# Patient Record
Sex: FEMALE | Race: WHITE | NOT HISPANIC OR LATINO
[De-identification: names, ages, dates, MRNs, and addresses within clinical notes are randomized per-mention and may not be internally consistent; named-entity substitution may affect disease eponyms.]

---

## 2017-04-01 ENCOUNTER — TRANSCRIPTION ENCOUNTER (OUTPATIENT)
Age: 37
End: 2017-04-01

## 2017-11-22 ENCOUNTER — TRANSCRIPTION ENCOUNTER (OUTPATIENT)
Age: 37
End: 2017-11-22

## 2018-07-16 PROBLEM — Z83.3 FAMILY HISTORY OF DIABETES MELLITUS: Status: ACTIVE | Noted: 2018-07-16

## 2018-07-17 ENCOUNTER — APPOINTMENT (OUTPATIENT)
Dept: ANTEPARTUM | Facility: CLINIC | Age: 38
End: 2018-07-17

## 2018-07-17 ENCOUNTER — APPOINTMENT (OUTPATIENT)
Dept: MATERNAL FETAL MEDICINE | Facility: CLINIC | Age: 38
End: 2018-07-17

## 2018-07-17 DIAGNOSIS — Z83.3 FAMILY HISTORY OF DIABETES MELLITUS: ICD-10-CM

## 2018-09-05 ENCOUNTER — RX CHANGE (OUTPATIENT)
Age: 38
End: 2018-09-05

## 2018-11-02 ENCOUNTER — APPOINTMENT (OUTPATIENT)
Dept: OBGYN | Facility: CLINIC | Age: 38
End: 2018-11-02
Payer: COMMERCIAL

## 2018-11-02 VITALS
SYSTOLIC BLOOD PRESSURE: 120 MMHG | DIASTOLIC BLOOD PRESSURE: 70 MMHG | BODY MASS INDEX: 39.37 KG/M2 | WEIGHT: 245 LBS | HEIGHT: 66 IN

## 2018-11-02 DIAGNOSIS — Z86.39 PERSONAL HISTORY OF OTHER ENDOCRINE, NUTRITIONAL AND METABOLIC DISEASE: ICD-10-CM

## 2018-11-02 DIAGNOSIS — Z87.09 PERSONAL HISTORY OF OTHER DISEASES OF THE RESPIRATORY SYSTEM: ICD-10-CM

## 2018-11-02 DIAGNOSIS — Z78.9 OTHER SPECIFIED HEALTH STATUS: ICD-10-CM

## 2018-11-02 PROCEDURE — 99213 OFFICE O/P EST LOW 20 MIN: CPT

## 2018-11-28 ENCOUNTER — ASOB RESULT (OUTPATIENT)
Age: 38
End: 2018-11-28

## 2018-11-28 ENCOUNTER — APPOINTMENT (OUTPATIENT)
Dept: ANTEPARTUM | Facility: CLINIC | Age: 38
End: 2018-11-28
Payer: COMMERCIAL

## 2018-11-28 ENCOUNTER — APPOINTMENT (OUTPATIENT)
Dept: OBGYN | Facility: CLINIC | Age: 38
End: 2018-11-28
Payer: COMMERCIAL

## 2018-11-28 VITALS
DIASTOLIC BLOOD PRESSURE: 78 MMHG | WEIGHT: 256 LBS | SYSTOLIC BLOOD PRESSURE: 118 MMHG | HEIGHT: 66 IN | BODY MASS INDEX: 41.14 KG/M2

## 2018-11-28 PROCEDURE — 99213 OFFICE O/P EST LOW 20 MIN: CPT

## 2018-11-28 PROCEDURE — 81025 URINE PREGNANCY TEST: CPT

## 2018-11-28 PROCEDURE — 76817 TRANSVAGINAL US OBSTETRIC: CPT

## 2018-12-19 ENCOUNTER — ASOB RESULT (OUTPATIENT)
Age: 38
End: 2018-12-19

## 2018-12-19 ENCOUNTER — APPOINTMENT (OUTPATIENT)
Dept: OBGYN | Facility: CLINIC | Age: 38
End: 2018-12-19
Payer: COMMERCIAL

## 2018-12-19 ENCOUNTER — APPOINTMENT (OUTPATIENT)
Dept: ANTEPARTUM | Facility: CLINIC | Age: 38
End: 2018-12-19
Payer: COMMERCIAL

## 2018-12-19 VITALS
HEIGHT: 66 IN | BODY MASS INDEX: 42.43 KG/M2 | SYSTOLIC BLOOD PRESSURE: 116 MMHG | WEIGHT: 264 LBS | DIASTOLIC BLOOD PRESSURE: 78 MMHG

## 2018-12-19 PROCEDURE — 99213 OFFICE O/P EST LOW 20 MIN: CPT

## 2018-12-19 PROCEDURE — 0501F PRENATAL FLOW SHEET: CPT

## 2018-12-19 PROCEDURE — 76801 OB US < 14 WKS SINGLE FETUS: CPT

## 2018-12-22 ENCOUNTER — LABORATORY RESULT (OUTPATIENT)
Age: 38
End: 2018-12-22

## 2019-01-02 ENCOUNTER — TRANSCRIPTION ENCOUNTER (OUTPATIENT)
Age: 39
End: 2019-01-02

## 2019-01-07 ENCOUNTER — APPOINTMENT (OUTPATIENT)
Dept: ANTEPARTUM | Facility: CLINIC | Age: 39
End: 2019-01-07
Payer: COMMERCIAL

## 2019-01-07 ENCOUNTER — ASOB RESULT (OUTPATIENT)
Age: 39
End: 2019-01-07

## 2019-01-07 ENCOUNTER — APPOINTMENT (OUTPATIENT)
Dept: MATERNAL FETAL MEDICINE | Facility: CLINIC | Age: 39
End: 2019-01-07
Payer: COMMERCIAL

## 2019-01-07 VITALS
BODY MASS INDEX: 41.78 KG/M2 | DIASTOLIC BLOOD PRESSURE: 70 MMHG | HEIGHT: 66 IN | WEIGHT: 260 LBS | SYSTOLIC BLOOD PRESSURE: 128 MMHG

## 2019-01-07 DIAGNOSIS — Z78.9 OTHER SPECIFIED HEALTH STATUS: ICD-10-CM

## 2019-01-07 DIAGNOSIS — Z80.42 FAMILY HISTORY OF MALIGNANT NEOPLASM OF PROSTATE: ICD-10-CM

## 2019-01-07 DIAGNOSIS — Z82.49 FAMILY HISTORY OF ISCHEMIC HEART DISEASE AND OTHER DISEASES OF THE CIRCULATORY SYSTEM: ICD-10-CM

## 2019-01-07 DIAGNOSIS — Z80.49 FAMILY HISTORY OF MALIGNANT NEOPLASM OF OTHER GENITAL ORGANS: ICD-10-CM

## 2019-01-07 DIAGNOSIS — Z80.41 FAMILY HISTORY OF MALIGNANT NEOPLASM OF OVARY: ICD-10-CM

## 2019-01-07 PROCEDURE — 76813 OB US NUCHAL MEAS 1 GEST: CPT

## 2019-01-07 PROCEDURE — 99244 OFF/OP CNSLTJ NEW/EST MOD 40: CPT

## 2019-01-07 RX ORDER — MEDROXYPROGESTERONE ACETATE 10 MG/1
10 TABLET ORAL DAILY
Qty: 10 | Refills: 0 | Status: DISCONTINUED | COMMUNITY
Start: 2018-11-02 | End: 2019-01-07

## 2019-01-07 NOTE — VITALS
[US Date: ___] : ultrasound performed on [unfilled]. [GA= ___ Weeks] : Results were GA of [unfilled] weeks [GA= ___ Days] : and [unfilled] day(s) [MARGARITA by US (date): ___] : The calculated MARGARITA by US is [unfilled] [By US] : this is the final MARGARITA

## 2019-01-07 NOTE — HISTORY OF PRESENT ILLNESS
[FreeTextEntry1] : Sinai presents today for her ultrascreen as well as due to her history of pregestational diabetes.

## 2019-01-07 NOTE — ACTIVE PROBLEMS
[Diabetes Mellitus] : no diabetes mellitus [Hypertension] : no hypertension [Heart Disease] : no heart disease [Autoimmune Disease] : no autoimmune disease [Renal Disease] : no kidney disease, no UTI [Neurologic Disorder] : no neurologic disorder, no epilepsy [Psychiatric Disorders] : no psychiatric disorders [Depression] : no depression, no post partum depression [Hepatic Disorder] : no hepatitis, no liver disease [Thrombophlebitis] : no varicosities, no phlebitis [Thyroid Disorder] : no thyroid dysfunction [Trauma] : no trauma/violence [Blood Transfusion (___ Ml)] : no history of blood transfusion

## 2019-01-07 NOTE — PAST MEDICAL HISTORY
[HIV Infection] : no HIV [Exposure To Gonorrhea] : no gonorrhea [Chlamydial Infections] : no chlamydia [Syphilis] : no syphilis [Herpes Simplex] : no genital herpes [Human Papilloma Virus Infection] : no genital warts [Hepatitis, B Virus] : no Hepatitis B [Hepatitis, C Virus] : no Hepatitis C [Trichomoniasis] : no trichomoniasis

## 2019-01-07 NOTE — DATA REVIEWED
[FreeTextEntry1] : Sonogram today shows a normal NT measurement. \par \par We biefly reviewed her glucose control, and reinforced that the normal values for 2 hour post prandial would be below 120.  Her hemoglobin A1c has normalized under the care of her endocrinologist, and she is quite happy with the care she is receiving. \par \par We discussed the need for increased monitoring in the third trimesetr, as well as the indication for a fetal echo

## 2019-01-07 NOTE — DISCUSSION/SUMMARY
[FreeTextEntry1] : Level 2 sonogram at 20 weeks. \par \par Fetal echo at 20-22 weeks. \par \par growth scan every 4 weeks after the Level 2\par \par Followup with endocrinologist monthly.

## 2019-01-07 NOTE — FAMILY HISTORY
[Reported Family History Of Birth Defects] : no congenital heart defects [Jose E-Sachs Carrier] : no Jose E-Sachs [Family History] : no mental retardation/autism [Reported Family History Of Genetic Disease] : no history of child defect in child of baby father

## 2019-01-07 NOTE — OB HISTORY
[LMP: ___] : LMP: [unfilled] [MARGARITA: ___] : MARGARITA: [unfilled] [EGA: ___ wks] : EGA: [unfilled] wks [Spontaneous] : Spontaneous conception [at ___ wks] : at [unfilled] weeks [Definite:  ___ (Date)] : the last menstrual period was [unfilled] [Normal Amount/Duration] : was of a normal amount and duration [Spotting Between  Menses] : no spotting between menses [Regular Cycle Intervals] : periods have been regular [Frequency: Q ___ days] : menstrual periods occur approximately every [unfilled] days [Menstrual Cramps] : no menstrual cramps [On BCP at conception] : the patient was not on BCP at conception

## 2019-01-07 NOTE — SURGICAL HISTORY
[Abn Paps] : abnormal pap smear [Breast Disease] : breast disease [STI's] : no STI's [Infertility] : no infertility [Cysts] : no cysts [OC Use] : no OC use [Last Pap: ___] : Last Pap: [unfilled] [Last Mammo: ___] : Last Mammo: [unfilled] [de-identified] : history of abn PAP, colposcopy, most recent PAPS WNL

## 2019-01-10 ENCOUNTER — LABORATORY RESULT (OUTPATIENT)
Age: 39
End: 2019-01-10

## 2019-01-11 ENCOUNTER — APPOINTMENT (OUTPATIENT)
Dept: MATERNAL FETAL MEDICINE | Facility: CLINIC | Age: 39
End: 2019-01-11
Payer: COMMERCIAL

## 2019-01-11 ENCOUNTER — ASOB RESULT (OUTPATIENT)
Age: 39
End: 2019-01-11

## 2019-01-11 LAB
1ST TRIMESTER DATA: NORMAL
ADDENDUM DOC: NORMAL
AFP PNL SERPL: NORMAL
AFP SERPL-ACNC: NORMAL
CLINICAL BIOCHEMIST REVIEW: NORMAL
FREE BETA HCG 1ST TRIMESTER: NORMAL
Lab: NORMAL
NOTES NTD: NORMAL
NT: NORMAL
PAPP-A SERPL-ACNC: NORMAL
TRISOMY 18/3: NORMAL

## 2019-01-11 PROCEDURE — 99241 OFFICE CONSULTATION NEW/ESTAB PATIENT 15 MIN: CPT

## 2019-01-16 ENCOUNTER — LABORATORY RESULT (OUTPATIENT)
Age: 39
End: 2019-01-16

## 2019-01-23 ENCOUNTER — APPOINTMENT (OUTPATIENT)
Dept: OBGYN | Facility: CLINIC | Age: 39
End: 2019-01-23
Payer: COMMERCIAL

## 2019-01-23 VITALS
SYSTOLIC BLOOD PRESSURE: 132 MMHG | BODY MASS INDEX: 42.59 KG/M2 | HEIGHT: 66 IN | WEIGHT: 265 LBS | DIASTOLIC BLOOD PRESSURE: 82 MMHG

## 2019-01-23 PROCEDURE — 0502F SUBSEQUENT PRENATAL CARE: CPT

## 2019-02-16 ENCOUNTER — LABORATORY RESULT (OUTPATIENT)
Age: 39
End: 2019-02-16

## 2019-02-18 ENCOUNTER — LABORATORY RESULT (OUTPATIENT)
Age: 39
End: 2019-02-18

## 2019-02-19 ENCOUNTER — APPOINTMENT (OUTPATIENT)
Dept: OBGYN | Facility: CLINIC | Age: 39
End: 2019-02-19
Payer: COMMERCIAL

## 2019-02-19 VITALS
DIASTOLIC BLOOD PRESSURE: 90 MMHG | SYSTOLIC BLOOD PRESSURE: 139 MMHG | HEIGHT: 66 IN | WEIGHT: 277 LBS | BODY MASS INDEX: 44.52 KG/M2

## 2019-02-19 PROCEDURE — 0502F SUBSEQUENT PRENATAL CARE: CPT

## 2019-02-25 ENCOUNTER — ASOB RESULT (OUTPATIENT)
Age: 39
End: 2019-02-25

## 2019-02-25 ENCOUNTER — APPOINTMENT (OUTPATIENT)
Dept: ANTEPARTUM | Facility: CLINIC | Age: 39
End: 2019-02-25
Payer: COMMERCIAL

## 2019-02-25 PROCEDURE — 76811 OB US DETAILED SNGL FETUS: CPT

## 2019-02-25 PROCEDURE — 76817 TRANSVAGINAL US OBSTETRIC: CPT

## 2019-02-27 LAB
1ST TRIMESTER DATA: NORMAL
2ND TRIMESTER DATA: NORMAL
AFP PNL SERPL: NORMAL
AFP SERPL-ACNC: NORMAL
AFP SERPL-ACNC: NORMAL
B-HCG FREE SERPL-MCNC: NORMAL
CLINICAL BIOCHEMIST REVIEW: NORMAL
FREE BETA HCG 1ST TRIMESTER: NORMAL
INHIBIN A SERPL-MCNC: NORMAL
NOTES NTD: NORMAL
NT: NORMAL
PAPP-A SERPL-ACNC: NORMAL
U ESTRIOL SERPL-SCNC: NORMAL

## 2019-03-01 LAB
ABO + RH PNL BLD: NORMAL
BLD GP AB SCN SERPL QL: ABNORMAL

## 2019-03-04 ENCOUNTER — TRANSCRIPTION ENCOUNTER (OUTPATIENT)
Age: 39
End: 2019-03-04

## 2019-03-05 ENCOUNTER — APPOINTMENT (OUTPATIENT)
Dept: PEDIATRIC CARDIOLOGY | Facility: CLINIC | Age: 39
End: 2019-03-05
Payer: COMMERCIAL

## 2019-03-05 PROCEDURE — 99203 OFFICE O/P NEW LOW 30 MIN: CPT | Mod: 25

## 2019-03-05 PROCEDURE — 76825 ECHO EXAM OF FETAL HEART: CPT

## 2019-03-05 PROCEDURE — 93325 DOPPLER ECHO COLOR FLOW MAPG: CPT

## 2019-03-05 PROCEDURE — 76827 ECHO EXAM OF FETAL HEART: CPT

## 2019-03-20 ENCOUNTER — APPOINTMENT (OUTPATIENT)
Dept: OBGYN | Facility: CLINIC | Age: 39
End: 2019-03-20
Payer: COMMERCIAL

## 2019-03-20 VITALS
BODY MASS INDEX: 45 KG/M2 | SYSTOLIC BLOOD PRESSURE: 157 MMHG | WEIGHT: 280 LBS | DIASTOLIC BLOOD PRESSURE: 87 MMHG | HEIGHT: 66 IN

## 2019-03-20 PROCEDURE — 0502F SUBSEQUENT PRENATAL CARE: CPT

## 2019-03-25 ENCOUNTER — APPOINTMENT (OUTPATIENT)
Dept: ANTEPARTUM | Facility: CLINIC | Age: 39
End: 2019-03-25
Payer: COMMERCIAL

## 2019-03-25 ENCOUNTER — ASOB RESULT (OUTPATIENT)
Age: 39
End: 2019-03-25

## 2019-03-25 PROCEDURE — 76816 OB US FOLLOW-UP PER FETUS: CPT

## 2019-04-10 ENCOUNTER — APPOINTMENT (OUTPATIENT)
Dept: ANTEPARTUM | Facility: CLINIC | Age: 39
End: 2019-04-10
Payer: COMMERCIAL

## 2019-04-10 ENCOUNTER — ASOB RESULT (OUTPATIENT)
Age: 39
End: 2019-04-10

## 2019-04-10 PROCEDURE — 76821 MIDDLE CEREBRAL ARTERY ECHO: CPT

## 2019-04-17 ENCOUNTER — APPOINTMENT (OUTPATIENT)
Dept: OBGYN | Facility: CLINIC | Age: 39
End: 2019-04-17
Payer: COMMERCIAL

## 2019-04-17 ENCOUNTER — NON-APPOINTMENT (OUTPATIENT)
Age: 39
End: 2019-04-17

## 2019-04-17 VITALS
HEIGHT: 66 IN | DIASTOLIC BLOOD PRESSURE: 86 MMHG | WEIGHT: 290 LBS | BODY MASS INDEX: 46.61 KG/M2 | SYSTOLIC BLOOD PRESSURE: 124 MMHG

## 2019-04-17 PROCEDURE — 0502F SUBSEQUENT PRENATAL CARE: CPT

## 2019-04-24 ENCOUNTER — ASOB RESULT (OUTPATIENT)
Age: 39
End: 2019-04-24

## 2019-04-24 ENCOUNTER — APPOINTMENT (OUTPATIENT)
Dept: ANTEPARTUM | Facility: CLINIC | Age: 39
End: 2019-04-24
Payer: COMMERCIAL

## 2019-04-24 PROCEDURE — 76816 OB US FOLLOW-UP PER FETUS: CPT

## 2019-04-24 PROCEDURE — 76821 MIDDLE CEREBRAL ARTERY ECHO: CPT

## 2019-05-02 ENCOUNTER — APPOINTMENT (OUTPATIENT)
Dept: OBGYN | Facility: CLINIC | Age: 39
End: 2019-05-02
Payer: COMMERCIAL

## 2019-05-02 VITALS
DIASTOLIC BLOOD PRESSURE: 84 MMHG | HEIGHT: 66 IN | SYSTOLIC BLOOD PRESSURE: 126 MMHG | BODY MASS INDEX: 46.61 KG/M2 | WEIGHT: 290 LBS

## 2019-05-02 PROCEDURE — 0502F SUBSEQUENT PRENATAL CARE: CPT

## 2019-05-08 ENCOUNTER — ASOB RESULT (OUTPATIENT)
Age: 39
End: 2019-05-08

## 2019-05-08 ENCOUNTER — APPOINTMENT (OUTPATIENT)
Dept: ANTEPARTUM | Facility: CLINIC | Age: 39
End: 2019-05-08
Payer: COMMERCIAL

## 2019-05-08 PROCEDURE — 76819 FETAL BIOPHYS PROFIL W/O NST: CPT

## 2019-05-08 PROCEDURE — 76821 MIDDLE CEREBRAL ARTERY ECHO: CPT

## 2019-05-16 ENCOUNTER — APPOINTMENT (OUTPATIENT)
Dept: OBGYN | Facility: CLINIC | Age: 39
End: 2019-05-16
Payer: COMMERCIAL

## 2019-05-16 VITALS
SYSTOLIC BLOOD PRESSURE: 132 MMHG | BODY MASS INDEX: 46.77 KG/M2 | DIASTOLIC BLOOD PRESSURE: 82 MMHG | HEIGHT: 66 IN | WEIGHT: 291 LBS

## 2019-05-16 PROCEDURE — 59025 FETAL NON-STRESS TEST: CPT

## 2019-05-16 PROCEDURE — 0502F SUBSEQUENT PRENATAL CARE: CPT

## 2019-05-22 ENCOUNTER — APPOINTMENT (OUTPATIENT)
Dept: ANTEPARTUM | Facility: CLINIC | Age: 39
End: 2019-05-22
Payer: COMMERCIAL

## 2019-05-22 ENCOUNTER — ASOB RESULT (OUTPATIENT)
Age: 39
End: 2019-05-22

## 2019-05-22 PROCEDURE — 76820 UMBILICAL ARTERY ECHO: CPT

## 2019-05-22 PROCEDURE — 93976 VASCULAR STUDY: CPT

## 2019-05-22 PROCEDURE — 76821 MIDDLE CEREBRAL ARTERY ECHO: CPT

## 2019-05-22 PROCEDURE — 76816 OB US FOLLOW-UP PER FETUS: CPT

## 2019-05-28 ENCOUNTER — APPOINTMENT (OUTPATIENT)
Dept: OBGYN | Facility: CLINIC | Age: 39
End: 2019-05-28
Payer: COMMERCIAL

## 2019-05-28 VITALS
HEIGHT: 66 IN | BODY MASS INDEX: 46.28 KG/M2 | DIASTOLIC BLOOD PRESSURE: 78 MMHG | WEIGHT: 288 LBS | SYSTOLIC BLOOD PRESSURE: 120 MMHG

## 2019-05-28 PROCEDURE — 0502F SUBSEQUENT PRENATAL CARE: CPT

## 2019-05-31 ENCOUNTER — APPOINTMENT (OUTPATIENT)
Dept: ANTEPARTUM | Facility: CLINIC | Age: 39
End: 2019-05-31
Payer: COMMERCIAL

## 2019-05-31 ENCOUNTER — ASOB RESULT (OUTPATIENT)
Age: 39
End: 2019-05-31

## 2019-05-31 PROCEDURE — 93976 VASCULAR STUDY: CPT

## 2019-05-31 PROCEDURE — 76820 UMBILICAL ARTERY ECHO: CPT

## 2019-05-31 PROCEDURE — 76818 FETAL BIOPHYS PROFILE W/NST: CPT

## 2019-05-31 PROCEDURE — 76821 MIDDLE CEREBRAL ARTERY ECHO: CPT

## 2019-06-07 ENCOUNTER — APPOINTMENT (OUTPATIENT)
Age: 39
End: 2019-06-07
Payer: COMMERCIAL

## 2019-06-07 ENCOUNTER — ASOB RESULT (OUTPATIENT)
Age: 39
End: 2019-06-07

## 2019-06-07 PROCEDURE — 93976 VASCULAR STUDY: CPT

## 2019-06-07 PROCEDURE — 76818 FETAL BIOPHYS PROFILE W/NST: CPT

## 2019-06-07 PROCEDURE — 76815 OB US LIMITED FETUS(S): CPT | Mod: 59

## 2019-06-07 PROCEDURE — 76821 MIDDLE CEREBRAL ARTERY ECHO: CPT

## 2019-06-07 PROCEDURE — 76820 UMBILICAL ARTERY ECHO: CPT

## 2019-06-13 ENCOUNTER — APPOINTMENT (OUTPATIENT)
Dept: OBGYN | Facility: CLINIC | Age: 39
End: 2019-06-13
Payer: COMMERCIAL

## 2019-06-13 VITALS
BODY MASS INDEX: 47.09 KG/M2 | DIASTOLIC BLOOD PRESSURE: 77 MMHG | HEIGHT: 66 IN | SYSTOLIC BLOOD PRESSURE: 125 MMHG | WEIGHT: 293 LBS

## 2019-06-13 PROCEDURE — 0502F SUBSEQUENT PRENATAL CARE: CPT

## 2019-06-13 PROCEDURE — 90715 TDAP VACCINE 7 YRS/> IM: CPT

## 2019-06-13 PROCEDURE — 90471 IMMUNIZATION ADMIN: CPT

## 2019-06-14 ENCOUNTER — APPOINTMENT (OUTPATIENT)
Age: 39
End: 2019-06-14
Payer: COMMERCIAL

## 2019-06-14 ENCOUNTER — ASOB RESULT (OUTPATIENT)
Age: 39
End: 2019-06-14

## 2019-06-14 PROCEDURE — 93976 VASCULAR STUDY: CPT

## 2019-06-21 ENCOUNTER — APPOINTMENT (OUTPATIENT)
Dept: ANTEPARTUM | Facility: CLINIC | Age: 39
End: 2019-06-21
Payer: COMMERCIAL

## 2019-06-21 ENCOUNTER — OUTPATIENT (OUTPATIENT)
Dept: INPATIENT UNIT | Facility: HOSPITAL | Age: 39
LOS: 1 days | End: 2019-06-21
Payer: COMMERCIAL

## 2019-06-21 ENCOUNTER — ASOB RESULT (OUTPATIENT)
Age: 39
End: 2019-06-21

## 2019-06-21 VITALS — DIASTOLIC BLOOD PRESSURE: 86 MMHG | SYSTOLIC BLOOD PRESSURE: 120 MMHG | HEART RATE: 110 BPM

## 2019-06-21 VITALS — DIASTOLIC BLOOD PRESSURE: 84 MMHG | HEART RATE: 96 BPM | SYSTOLIC BLOOD PRESSURE: 128 MMHG

## 2019-06-21 DIAGNOSIS — O47.03 FALSE LABOR BEFORE 37 COMPLETED WEEKS OF GESTATION, THIRD TRIMESTER: ICD-10-CM

## 2019-06-21 PROCEDURE — 76818 FETAL BIOPHYS PROFILE W/NST: CPT

## 2019-06-21 PROCEDURE — G0463: CPT

## 2019-06-21 PROCEDURE — 76816 OB US FOLLOW-UP PER FETUS: CPT

## 2019-06-21 PROCEDURE — 76820 UMBILICAL ARTERY ECHO: CPT

## 2019-06-21 PROCEDURE — 99234 HOSP IP/OBS SM DT SF/LOW 45: CPT

## 2019-06-21 PROCEDURE — 93976 VASCULAR STUDY: CPT

## 2019-06-21 PROCEDURE — 59025 FETAL NON-STRESS TEST: CPT

## 2019-06-21 NOTE — OB PROVIDER TRIAGE NOTE - NSHPPHYSICALEXAM_GEN_ALL_CORE
Vital Signs Last 24 Hrs  HR: 110 (21 Jun 2019 10:19) (110 - 110)  BP: 120/86 (21 Jun 2019 10:19) (120/86 - 120/86)    Gen: NAD. Resting comfortably in bed.  CV: Tachycardic. Regular rhythm.  PULM: CTAB  Abd: Gravid. Soft. Non-tender. Non-distended.  Ext: Bilateral +2 pitting edema. No calf tenderness.

## 2019-06-21 NOTE — OB PROVIDER TRIAGE NOTE - HISTORY OF PRESENT ILLNESS
Pt is a 38 yo  @ 35.6 wks complicated by DMT2. Pt reports from routine weekly NST this AM due to variable deceleration and contractions in the M office. Pt reports irregular contractions since yesterday. She vomited 3x last night but was able to keep down breakfast this morning. She denies any sick contacts. She denies VB or LOF. She reports fetal movement.  Denies dizziness, CP, SOB or abdominal pain.

## 2019-06-21 NOTE — OB PROVIDER TRIAGE NOTE - NSOBPROVIDERNOTE_OBGYN_ALL_OB_FT
40yo  @ 35.6w with reassuring NST. Pt to be seen in the office on Monday by Dr. Albert. Pt can be discharged. Plan and tracing reviewed by Dr. Albert.

## 2019-06-24 ENCOUNTER — APPOINTMENT (OUTPATIENT)
Dept: OBGYN | Facility: CLINIC | Age: 39
End: 2019-06-24
Payer: COMMERCIAL

## 2019-06-24 ENCOUNTER — LABORATORY RESULT (OUTPATIENT)
Age: 39
End: 2019-06-24

## 2019-06-24 PROCEDURE — 0502F SUBSEQUENT PRENATAL CARE: CPT

## 2019-06-25 PROBLEM — E11.9 TYPE 2 DIABETES MELLITUS WITHOUT COMPLICATIONS: Chronic | Status: ACTIVE | Noted: 2019-06-21

## 2019-06-26 ENCOUNTER — LABORATORY RESULT (OUTPATIENT)
Age: 39
End: 2019-06-26

## 2019-06-28 ENCOUNTER — APPOINTMENT (OUTPATIENT)
Age: 39
End: 2019-06-28
Payer: COMMERCIAL

## 2019-06-28 ENCOUNTER — ASOB RESULT (OUTPATIENT)
Age: 39
End: 2019-06-28

## 2019-06-28 PROCEDURE — 93976 VASCULAR STUDY: CPT

## 2019-06-28 PROCEDURE — 76821 MIDDLE CEREBRAL ARTERY ECHO: CPT

## 2019-06-28 PROCEDURE — 76820 UMBILICAL ARTERY ECHO: CPT

## 2019-06-28 PROCEDURE — 76818 FETAL BIOPHYS PROFILE W/NST: CPT

## 2019-06-28 PROCEDURE — 76815 OB US LIMITED FETUS(S): CPT | Mod: 59

## 2019-06-28 PROCEDURE — 99213 OFFICE O/P EST LOW 20 MIN: CPT

## 2019-07-01 ENCOUNTER — TRANSCRIPTION ENCOUNTER (OUTPATIENT)
Age: 39
End: 2019-07-01

## 2019-07-01 ENCOUNTER — APPOINTMENT (OUTPATIENT)
Dept: OBGYN | Facility: CLINIC | Age: 39
End: 2019-07-01
Payer: COMMERCIAL

## 2019-07-01 VITALS
DIASTOLIC BLOOD PRESSURE: 80 MMHG | WEIGHT: 293 LBS | HEIGHT: 66 IN | SYSTOLIC BLOOD PRESSURE: 120 MMHG | BODY MASS INDEX: 47.09 KG/M2

## 2019-07-01 PROCEDURE — 0502F SUBSEQUENT PRENATAL CARE: CPT

## 2019-07-01 PROCEDURE — 59426 ANTEPARTUM CARE ONLY: CPT

## 2019-07-02 ENCOUNTER — APPOINTMENT (OUTPATIENT)
Age: 39
End: 2019-07-02
Payer: COMMERCIAL

## 2019-07-02 ENCOUNTER — INPATIENT (INPATIENT)
Facility: HOSPITAL | Age: 39
LOS: 4 days | Discharge: HOME | End: 2019-07-07
Payer: COMMERCIAL

## 2019-07-02 ENCOUNTER — ASOB RESULT (OUTPATIENT)
Age: 39
End: 2019-07-02

## 2019-07-02 VITALS — HEART RATE: 112 BPM | DIASTOLIC BLOOD PRESSURE: 81 MMHG | SYSTOLIC BLOOD PRESSURE: 150 MMHG

## 2019-07-02 DIAGNOSIS — Z98.890 OTHER SPECIFIED POSTPROCEDURAL STATES: Chronic | ICD-10-CM

## 2019-07-02 DIAGNOSIS — E11.9 TYPE 2 DIABETES MELLITUS WITHOUT COMPLICATIONS: ICD-10-CM

## 2019-07-02 DIAGNOSIS — Z3A.37 37 WEEKS GESTATION OF PREGNANCY: ICD-10-CM

## 2019-07-02 DIAGNOSIS — O09.513 SUPERVISION OF ELDERLY PRIMIGRAVIDA, THIRD TRIMESTER: ICD-10-CM

## 2019-07-02 DIAGNOSIS — O47.1 FALSE LABOR AT OR AFTER 37 COMPLETED WEEKS OF GESTATION: ICD-10-CM

## 2019-07-02 DIAGNOSIS — E66.01 MORBID (SEVERE) OBESITY DUE TO EXCESS CALORIES: ICD-10-CM

## 2019-07-02 LAB
ALBUMIN SERPL ELPH-MCNC: 3 G/DL — LOW (ref 3.3–5.2)
ALLERGY+IMMUNOLOGY DIAG STUDY NOTE: SIGNIFICANT CHANGE UP
ALP SERPL-CCNC: 89 U/L — SIGNIFICANT CHANGE UP (ref 40–120)
ALT FLD-CCNC: 9 U/L — SIGNIFICANT CHANGE UP
ANION GAP SERPL CALC-SCNC: 12 MMOL/L — SIGNIFICANT CHANGE UP (ref 5–17)
APPEARANCE UR: ABNORMAL
AST SERPL-CCNC: 13 U/L — SIGNIFICANT CHANGE UP
BACTERIA # UR AUTO: ABNORMAL
BASOPHILS # BLD AUTO: 0.02 K/UL — SIGNIFICANT CHANGE UP (ref 0–0.2)
BASOPHILS NFR BLD AUTO: 0.3 % — SIGNIFICANT CHANGE UP (ref 0–2)
BILIRUB SERPL-MCNC: 0.2 MG/DL — LOW (ref 0.4–2)
BILIRUB UR-MCNC: ABNORMAL
BLD GP AB SCN SERPL QL: ABNORMAL
BUN SERPL-MCNC: 10 MG/DL — SIGNIFICANT CHANGE UP (ref 8–20)
CALCIUM SERPL-MCNC: 9.5 MG/DL — SIGNIFICANT CHANGE UP (ref 8.6–10.2)
CHLORIDE SERPL-SCNC: 102 MMOL/L — SIGNIFICANT CHANGE UP (ref 98–107)
CO2 SERPL-SCNC: 21 MMOL/L — LOW (ref 22–29)
COLOR SPEC: YELLOW — SIGNIFICANT CHANGE UP
CREAT SERPL-MCNC: 0.51 MG/DL — SIGNIFICANT CHANGE UP (ref 0.5–1.3)
DIFF PNL FLD: ABNORMAL
DIR ANTIGLOB POLYSPECIFIC INTERPRETATION: SIGNIFICANT CHANGE UP
EOSINOPHIL # BLD AUTO: 0.08 K/UL — SIGNIFICANT CHANGE UP (ref 0–0.5)
EOSINOPHIL NFR BLD AUTO: 1 % — SIGNIFICANT CHANGE UP (ref 0–6)
EPI CELLS # UR: ABNORMAL
GLUCOSE BLDC GLUCOMTR-MCNC: 122 MG/DL — HIGH (ref 70–99)
GLUCOSE BLDC GLUCOMTR-MCNC: 191 MG/DL — HIGH (ref 70–99)
GLUCOSE SERPL-MCNC: 205 MG/DL — HIGH (ref 70–115)
GLUCOSE UR QL: NEGATIVE MG/DL — SIGNIFICANT CHANGE UP
HCT VFR BLD CALC: 33.6 % — LOW (ref 34.5–45)
HGB BLD-MCNC: 11.5 G/DL — SIGNIFICANT CHANGE UP (ref 11.5–15.5)
IMM GRANULOCYTES NFR BLD AUTO: 0.5 % — SIGNIFICANT CHANGE UP (ref 0–1.5)
KETONES UR-MCNC: ABNORMAL
LEUKOCYTE ESTERASE UR-ACNC: ABNORMAL
LYMPHOCYTES # BLD AUTO: 1.32 K/UL — SIGNIFICANT CHANGE UP (ref 1–3.3)
LYMPHOCYTES # BLD AUTO: 17.1 % — SIGNIFICANT CHANGE UP (ref 13–44)
MCHC RBC-ENTMCNC: 30.5 PG — SIGNIFICANT CHANGE UP (ref 27–34)
MCHC RBC-ENTMCNC: 34.2 GM/DL — SIGNIFICANT CHANGE UP (ref 32–36)
MCV RBC AUTO: 89.1 FL — SIGNIFICANT CHANGE UP (ref 80–100)
MONOCYTES # BLD AUTO: 0.45 K/UL — SIGNIFICANT CHANGE UP (ref 0–0.9)
MONOCYTES NFR BLD AUTO: 5.8 % — SIGNIFICANT CHANGE UP (ref 2–14)
NEUTROPHILS # BLD AUTO: 5.81 K/UL — SIGNIFICANT CHANGE UP (ref 1.8–7.4)
NEUTROPHILS NFR BLD AUTO: 75.3 % — SIGNIFICANT CHANGE UP (ref 43–77)
NITRITE UR-MCNC: NEGATIVE — SIGNIFICANT CHANGE UP
PH UR: 5 — SIGNIFICANT CHANGE UP (ref 5–8)
PLATELET # BLD AUTO: 225 K/UL — SIGNIFICANT CHANGE UP (ref 150–400)
POTASSIUM SERPL-MCNC: 3.8 MMOL/L — SIGNIFICANT CHANGE UP (ref 3.5–5.3)
POTASSIUM SERPL-SCNC: 3.8 MMOL/L — SIGNIFICANT CHANGE UP (ref 3.5–5.3)
PROT SERPL-MCNC: 6.1 G/DL — LOW (ref 6.6–8.7)
PROT UR-MCNC: 100 MG/DL
RBC # BLD: 3.77 M/UL — LOW (ref 3.8–5.2)
RBC # FLD: 13.9 % — SIGNIFICANT CHANGE UP (ref 10.3–14.5)
RBC CASTS # UR COMP ASSIST: ABNORMAL /HPF (ref 0–4)
SODIUM SERPL-SCNC: 135 MMOL/L — SIGNIFICANT CHANGE UP (ref 135–145)
SP GR SPEC: 1.02 — SIGNIFICANT CHANGE UP (ref 1.01–1.02)
TYPE + AB SCN PNL BLD: SIGNIFICANT CHANGE UP
UROBILINOGEN FLD QL: 1 MG/DL
WBC # BLD: 7.72 K/UL — SIGNIFICANT CHANGE UP (ref 3.8–10.5)
WBC # FLD AUTO: 7.72 K/UL — SIGNIFICANT CHANGE UP (ref 3.8–10.5)
WBC UR QL: ABNORMAL

## 2019-07-02 PROCEDURE — 76820 UMBILICAL ARTERY ECHO: CPT

## 2019-07-02 PROCEDURE — 76818 FETAL BIOPHYS PROFILE W/NST: CPT

## 2019-07-02 PROCEDURE — 76816 OB US FOLLOW-UP PER FETUS: CPT

## 2019-07-02 PROCEDURE — 86077 PHYS BLOOD BANK SERV XMATCH: CPT

## 2019-07-02 PROCEDURE — 59515 CESAREAN DELIVERY: CPT

## 2019-07-02 RX ORDER — SODIUM CHLORIDE 9 MG/ML
1000 INJECTION, SOLUTION INTRAVENOUS
Refills: 0 | Status: DISCONTINUED | OUTPATIENT
Start: 2019-07-02 | End: 2019-07-03

## 2019-07-02 RX ORDER — OXYTOCIN 10 UNIT/ML
333.33 VIAL (ML) INJECTION
Qty: 20 | Refills: 0 | Status: COMPLETED | OUTPATIENT
Start: 2019-07-02 | End: 2019-07-02

## 2019-07-02 RX ORDER — PANTOPRAZOLE SODIUM 20 MG/1
40 TABLET, DELAYED RELEASE ORAL
Refills: 0 | Status: DISCONTINUED | OUTPATIENT
Start: 2019-07-02 | End: 2019-07-03

## 2019-07-02 RX ORDER — INSULIN LISPRO 100/ML
VIAL (ML) SUBCUTANEOUS EVERY 4 HOURS
Refills: 0 | Status: DISCONTINUED | OUTPATIENT
Start: 2019-07-02 | End: 2019-07-07

## 2019-07-02 RX ORDER — DEXTROSE 50 % IN WATER 50 %
15 SYRINGE (ML) INTRAVENOUS ONCE
Refills: 0 | Status: DISCONTINUED | OUTPATIENT
Start: 2019-07-02 | End: 2019-07-07

## 2019-07-02 RX ORDER — DEXTROSE 50 % IN WATER 50 %
25 SYRINGE (ML) INTRAVENOUS ONCE
Refills: 0 | Status: DISCONTINUED | OUTPATIENT
Start: 2019-07-02 | End: 2019-07-03

## 2019-07-02 RX ORDER — GLUCAGON INJECTION, SOLUTION 0.5 MG/.1ML
1 INJECTION, SOLUTION SUBCUTANEOUS ONCE
Refills: 0 | Status: DISCONTINUED | OUTPATIENT
Start: 2019-07-02 | End: 2019-07-03

## 2019-07-02 RX ORDER — DEXTROSE 50 % IN WATER 50 %
12.5 SYRINGE (ML) INTRAVENOUS ONCE
Refills: 0 | Status: DISCONTINUED | OUTPATIENT
Start: 2019-07-02 | End: 2019-07-03

## 2019-07-02 RX ORDER — CITRIC ACID/SODIUM CITRATE 300-500 MG
30 SOLUTION, ORAL ORAL ONCE
Refills: 0 | Status: COMPLETED | OUTPATIENT
Start: 2019-07-02 | End: 2019-07-03

## 2019-07-02 RX ADMIN — Medication 2: at 23:09

## 2019-07-02 RX ADMIN — PANTOPRAZOLE SODIUM 40 MILLIGRAM(S): 20 TABLET, DELAYED RELEASE ORAL at 21:57

## 2019-07-02 NOTE — OB PROVIDER H&P - HISTORY OF PRESENT ILLNESS
Patient is a 40yo  at 37 4/7 weeks consistent with MARGARITA Patient is a 40yo  at 37 3/7 weeks consistent with MARGARITA 19 who presents to L&D from [facility] office for IOL for CAT 2 FHT and polyhydramnios OCTAVIO ___   Prenatal course complicated by   1. AMA   2. Pregestational DM2 on Insulin- Levemir 50AM/50PM, humalog 25 units with meals. Last A1c 6.0.   3. Reflux, on GERD and pantoprazole   4. LGA, EFW 8 lb 9oz     PMH: Asthma, last treated 14 years ago   PSH: right knee surgery, lump right breast, colpo x4, ex laparoscopy x4 for endometriosis, scalp cyst removal   ALL: NKDA

## 2019-07-02 NOTE — OB PROVIDER H&P - NSOBPROC_OBGYN_ALL_OB
Telephone Encounter by Charis Mackey RN, BSN at 08/02/17 02:13 PM     Author:  Charis Mackey RN, BSN Service:  (none) Author Type:  Registered Nurse     Filed:  08/02/17 02:13 PM Encounter Date:  7/28/2017 Status:  Signed     :  Charis Mackey RN, BSN (Registered Nurse)            Routing to [employer][SH1.1M]      Revision History        User Key Date/Time User Provider Type Action    > SH1.1 08/02/17 02:13 PM Charis Mackey RN, BSN Registered Nurse Sign    M - Manual             Unknown at This Time

## 2019-07-02 NOTE — OB PROVIDER H&P - ASSESSMENT
Patient is a 40yo  at 37 3/7 weeks consistent with MARGARITA 19 who presents to L&D from [facility] office for IOL for CAT 2 FHT and polyhydramnios OCTAVIO 26.2   -Admit to L&D for IOL cytotec   -Insulin sliding scale for diabetes management, accuchcecks q 4 hours overnight, will switch to q 2 hours in AM. q 1 hour during active labor as per Dr. Ni

## 2019-07-02 NOTE — OB PROVIDER H&P - NSHPPHYSICALEXAM_GEN_ALL_CORE
Vital Signs Last 24 Hrs  T(C): 36.9 (02 Jul 2019 19:27), Max: 37.1 (02 Jul 2019 18:06)  T(F): 98.42 (02 Jul 2019 19:27), Max: 98.8 (02 Jul 2019 18:06)  HR: 95 (02 Jul 2019 19:28) (95 - 112)  BP: 142/78 (02 Jul 2019 19:28) (142/78 - 151/79)  RR: 17 (02 Jul 2019 18:06) (17 - 17)    Abdomen: soft, nontender   FHT: baseline 150s, moderate variability, accels present, no decels    SVE: 1/50/-3

## 2019-07-02 NOTE — OB RN PATIENT PROFILE - PSH
H/O exploratory laparotomy    H/O knee surgery  2000  H/O lumpectomy  2003  History of colposcopy  4 times  History of removal of cyst  removed 8 cysts from head

## 2019-07-03 ENCOUNTER — TRANSCRIPTION ENCOUNTER (OUTPATIENT)
Age: 39
End: 2019-07-03

## 2019-07-03 LAB
GLUCOSE BLDC GLUCOMTR-MCNC: 126 MG/DL — HIGH (ref 70–99)
GLUCOSE BLDC GLUCOMTR-MCNC: 127 MG/DL — HIGH (ref 70–99)
GLUCOSE BLDC GLUCOMTR-MCNC: 127 MG/DL — HIGH (ref 70–99)
GLUCOSE BLDC GLUCOMTR-MCNC: 149 MG/DL — HIGH (ref 70–99)
GLUCOSE BLDC GLUCOMTR-MCNC: 176 MG/DL — HIGH (ref 70–99)
HBA1C BLD-MCNC: 6.3 % — HIGH (ref 4–5.6)
T PALLIDUM AB TITR SER: NEGATIVE — SIGNIFICANT CHANGE UP

## 2019-07-03 RX ORDER — FAMOTIDINE 10 MG/ML
20 INJECTION INTRAVENOUS DAILY
Refills: 0 | Status: DISCONTINUED | OUTPATIENT
Start: 2019-07-03 | End: 2019-07-07

## 2019-07-03 RX ORDER — DEXTROSE 50 % IN WATER 50 %
15 SYRINGE (ML) INTRAVENOUS ONCE
Refills: 0 | Status: DISCONTINUED | OUTPATIENT
Start: 2019-07-03 | End: 2019-07-07

## 2019-07-03 RX ORDER — NALOXONE HYDROCHLORIDE 4 MG/.1ML
0.1 SPRAY NASAL
Refills: 0 | Status: DISCONTINUED | OUTPATIENT
Start: 2019-07-03 | End: 2019-07-07

## 2019-07-03 RX ORDER — DOCUSATE SODIUM 100 MG
100 CAPSULE ORAL
Refills: 0 | Status: DISCONTINUED | OUTPATIENT
Start: 2019-07-03 | End: 2019-07-07

## 2019-07-03 RX ORDER — OXYCODONE HYDROCHLORIDE 5 MG/1
5 TABLET ORAL
Refills: 0 | Status: DISCONTINUED | OUTPATIENT
Start: 2019-07-03 | End: 2019-07-07

## 2019-07-03 RX ORDER — ACETAMINOPHEN 500 MG
1000 TABLET ORAL ONCE
Refills: 0 | Status: COMPLETED | OUTPATIENT
Start: 2019-07-03 | End: 2019-07-03

## 2019-07-03 RX ORDER — GENTAMICIN SULFATE 40 MG/ML
120 VIAL (ML) INJECTION ONCE
Refills: 0 | Status: COMPLETED | OUTPATIENT
Start: 2019-07-03 | End: 2019-07-03

## 2019-07-03 RX ORDER — SODIUM CHLORIDE 9 MG/ML
1000 INJECTION, SOLUTION INTRAVENOUS
Refills: 0 | Status: DISCONTINUED | OUTPATIENT
Start: 2019-07-03 | End: 2019-07-07

## 2019-07-03 RX ORDER — DEXTROSE 50 % IN WATER 50 %
25 SYRINGE (ML) INTRAVENOUS ONCE
Refills: 0 | Status: DISCONTINUED | OUTPATIENT
Start: 2019-07-03 | End: 2019-07-07

## 2019-07-03 RX ORDER — DEXTROSE 50 % IN WATER 50 %
12.5 SYRINGE (ML) INTRAVENOUS ONCE
Refills: 0 | Status: DISCONTINUED | OUTPATIENT
Start: 2019-07-03 | End: 2019-07-07

## 2019-07-03 RX ORDER — GLUCAGON INJECTION, SOLUTION 0.5 MG/.1ML
1 INJECTION, SOLUTION SUBCUTANEOUS ONCE
Refills: 0 | Status: DISCONTINUED | OUTPATIENT
Start: 2019-07-03 | End: 2019-07-07

## 2019-07-03 RX ORDER — KETOROLAC TROMETHAMINE 30 MG/ML
30 SYRINGE (ML) INJECTION ONCE
Refills: 0 | Status: DISCONTINUED | OUTPATIENT
Start: 2019-07-03 | End: 2019-07-04

## 2019-07-03 RX ORDER — INSULIN LISPRO 100/ML
25 VIAL (ML) SUBCUTANEOUS
Refills: 0 | Status: DISCONTINUED | OUTPATIENT
Start: 2019-07-03 | End: 2019-07-07

## 2019-07-03 RX ORDER — ENOXAPARIN SODIUM 100 MG/ML
40 INJECTION SUBCUTANEOUS DAILY
Refills: 0 | Status: DISCONTINUED | OUTPATIENT
Start: 2019-07-04 | End: 2019-07-07

## 2019-07-03 RX ORDER — IBUPROFEN 200 MG
600 TABLET ORAL EVERY 6 HOURS
Refills: 0 | Status: COMPLETED | OUTPATIENT
Start: 2019-07-03 | End: 2020-05-31

## 2019-07-03 RX ORDER — DEXAMETHASONE 0.5 MG/5ML
4 ELIXIR ORAL EVERY 6 HOURS
Refills: 0 | Status: DISCONTINUED | OUTPATIENT
Start: 2019-07-03 | End: 2019-07-07

## 2019-07-03 RX ORDER — SODIUM CHLORIDE 9 MG/ML
1000 INJECTION, SOLUTION INTRAVENOUS ONCE
Refills: 0 | Status: COMPLETED | OUTPATIENT
Start: 2019-07-03 | End: 2019-07-03

## 2019-07-03 RX ORDER — ONDANSETRON 8 MG/1
4 TABLET, FILM COATED ORAL EVERY 6 HOURS
Refills: 0 | Status: DISCONTINUED | OUTPATIENT
Start: 2019-07-03 | End: 2019-07-07

## 2019-07-03 RX ORDER — INSULIN DETEMIR 100/ML (3)
50 INSULIN PEN (ML) SUBCUTANEOUS
Refills: 0 | Status: DISCONTINUED | OUTPATIENT
Start: 2019-07-03 | End: 2019-07-07

## 2019-07-03 RX ORDER — PANTOPRAZOLE SODIUM 20 MG/1
40 TABLET, DELAYED RELEASE ORAL
Refills: 0 | Status: DISCONTINUED | OUTPATIENT
Start: 2019-07-03 | End: 2019-07-07

## 2019-07-03 RX ORDER — OXYTOCIN 10 UNIT/ML
333.33 VIAL (ML) INJECTION
Qty: 20 | Refills: 0 | Status: DISCONTINUED | OUTPATIENT
Start: 2019-07-03 | End: 2019-07-07

## 2019-07-03 RX ORDER — DIPHENHYDRAMINE HCL 50 MG
25 CAPSULE ORAL EVERY 6 HOURS
Refills: 0 | Status: DISCONTINUED | OUTPATIENT
Start: 2019-07-03 | End: 2019-07-07

## 2019-07-03 RX ORDER — TETANUS TOXOID, REDUCED DIPHTHERIA TOXOID AND ACELLULAR PERTUSSIS VACCINE, ADSORBED 5; 2.5; 8; 8; 2.5 [IU]/.5ML; [IU]/.5ML; UG/.5ML; UG/.5ML; UG/.5ML
0.5 SUSPENSION INTRAMUSCULAR ONCE
Refills: 0 | Status: DISCONTINUED | OUTPATIENT
Start: 2019-07-03 | End: 2019-07-07

## 2019-07-03 RX ORDER — NALBUPHINE HYDROCHLORIDE 10 MG/ML
2.5 INJECTION, SOLUTION INTRAMUSCULAR; INTRAVENOUS; SUBCUTANEOUS EVERY 6 HOURS
Refills: 0 | Status: DISCONTINUED | OUTPATIENT
Start: 2019-07-03 | End: 2019-07-07

## 2019-07-03 RX ORDER — OXYCODONE HYDROCHLORIDE 5 MG/1
5 TABLET ORAL ONCE
Refills: 0 | Status: DISCONTINUED | OUTPATIENT
Start: 2019-07-03 | End: 2019-07-07

## 2019-07-03 RX ORDER — SIMETHICONE 80 MG/1
80 TABLET, CHEWABLE ORAL EVERY 4 HOURS
Refills: 0 | Status: DISCONTINUED | OUTPATIENT
Start: 2019-07-03 | End: 2019-07-07

## 2019-07-03 RX ORDER — LANOLIN
1 OINTMENT (GRAM) TOPICAL EVERY 6 HOURS
Refills: 0 | Status: DISCONTINUED | OUTPATIENT
Start: 2019-07-03 | End: 2019-07-07

## 2019-07-03 RX ORDER — KETOROLAC TROMETHAMINE 30 MG/ML
15 SYRINGE (ML) INJECTION EVERY 6 HOURS
Refills: 0 | Status: DISCONTINUED | OUTPATIENT
Start: 2019-07-03 | End: 2019-07-03

## 2019-07-03 RX ORDER — DIPHENHYDRAMINE HCL 50 MG
25 CAPSULE ORAL EVERY 4 HOURS
Refills: 0 | Status: DISCONTINUED | OUTPATIENT
Start: 2019-07-03 | End: 2019-07-07

## 2019-07-03 RX ADMIN — Medication 100 MILLIGRAM(S): at 09:55

## 2019-07-03 RX ADMIN — Medication 400 MILLIGRAM(S): at 23:22

## 2019-07-03 RX ADMIN — Medication 100 MILLIGRAM(S): at 22:59

## 2019-07-03 RX ADMIN — Medication 1000 MILLIUNIT(S)/MIN: at 09:55

## 2019-07-03 RX ADMIN — Medication 50 UNIT(S): at 22:20

## 2019-07-03 RX ADMIN — Medication 100 MILLIGRAM(S): at 09:23

## 2019-07-03 RX ADMIN — Medication 30 MILLILITER(S): at 09:04

## 2019-07-03 RX ADMIN — Medication 1000 MILLIGRAM(S): at 23:37

## 2019-07-03 RX ADMIN — Medication 100 MILLIGRAM(S): at 23:26

## 2019-07-03 RX ADMIN — Medication 2: at 03:32

## 2019-07-03 RX ADMIN — Medication 4: at 14:45

## 2019-07-03 RX ADMIN — SIMETHICONE 80 MILLIGRAM(S): 80 TABLET, CHEWABLE ORAL at 22:59

## 2019-07-03 RX ADMIN — SODIUM CHLORIDE 2000 MILLILITER(S): 9 INJECTION, SOLUTION INTRAVENOUS at 09:00

## 2019-07-03 RX ADMIN — Medication 100 MILLIGRAM(S): at 15:46

## 2019-07-03 RX ADMIN — Medication 2: at 21:54

## 2019-07-03 NOTE — DISCHARGE NOTE OB - CARE PROVIDER_API CALL
Aby Albert (DO)  Obstetrics and Gynecology  [address] [place] [address]  Phone: [phone]  Fax: [phone]  Follow Up Time:

## 2019-07-03 NOTE — OB PROVIDER DELIVERY SUMMARY - NSPROVIDERDELIVERYNOTE_OBGYN_ALL_OB_FT
After pt was examined and 1cm dilated, she requested primary C/S. Risk of C/S had been reviewed, including pregestational DM on insulin, polyhydramnios, suspected macrosomia, maternal morbid obesity.    After R/B/A reviewed at length, pt taken to OR for primary C/S.  C/S performed by Dr JASON Zamorano, assist Dr RAF Cullen under spinal anesthesia.  C/S complicated by maternal morbid obesity, BMI=49.  Delivered live female infant, VTX at 9:54am.  APGAR 9/9, weight 3790g, 8lb 60z.  Large amt of clear amniotic fluid C/W polyhydramnios.  Uterus, tubes, ovaries WNL.  Pelvis very deep.  Intercede placed over uterine closure prior to closing abd wall.  ANAMIKA dressing used over staples.  Skin-to-skin initiated in OR and continued to RR.  EBL 700cc.  Baby later admitted to WBN, mother stable.  BGMs mionitored closely.

## 2019-07-03 NOTE — CHART NOTE - NSCHARTNOTEFT_GEN_A_CORE
Pt seen and examined:  Cervix 1cm, soft, intact membranes palpable.  Pt reports severe pain with exam - admits to little tolerance of any exams.  Discussed plan with pt at length.  She declines AROM now and hopes for delivery soon.  I've explained she is only 1 cm dilated and delivery will not likely be soon with hopes for .  Discussed R/B/A continued induction vs C/S (C/S had been discussed in the office at length).  Pt declines continuing induction - requesting C/S at this time.  She understands that with DM on insulin, morbid obesity, suspected macrosomia, and polyhydramnios she does have a higher chance of C/S than average and she also feels that labor is "the worst experience in my life".  She states that "nothing has happened in 14 hours except me having more and more pain" and she prefers delivery by C/S.  She understands her recovery will be harder, maura in view of her BMI and DM on insulin, but prefers that to a lengthy labor with possible C/S anyway.

## 2019-07-03 NOTE — OB RN DELIVERY SUMMARY - NS_SEPSISRSKCALC_OBGYN_ALL_OB_FT
EOS calculated successfully. EOS Risk Factor: 0.5/1000 live births ([facility] national incidence); GA=37w4d; Temp=98.8; ROM=1; GBS='Unknown'; Antibiotics='No antibiotics or any antibiotics < 2 hrs prior to birth'

## 2019-07-03 NOTE — DISCHARGE NOTE OB - CARE PLAN
Principal Discharge DX:	 delivery delivered  Goal:	recovery  Assessment and plan of treatment:	follow up for incision check and staple removal in 4-5 days

## 2019-07-03 NOTE — CHART NOTE - NSCHARTNOTEFT_GEN_A_CORE
S: patient doing well, starting to feel more contractions. Thinking about pain medication but declines at this time     O:   Vital Signs Last 24 Hrs  T(C): 36.9 (03 Jul 2019 03:32), Max: 37.1 (02 Jul 2019 18:06)  T(F): 98.42 (03 Jul 2019 03:32), Max: 98.8 (02 Jul 2019 18:06)  HR: 96 (03 Jul 2019 03:32) (95 - 112)  BP: 140/75 (03 Jul 2019 03:32) (140/75 - 151/79)  RR: 17 (02 Jul 2019 18:06) (17 - 17)    FHT: baseline 140s, moderate variability, accelerations present, one prolonged deceleration x3 minutes, one late deceleration, one variable deceleration. Now FHT improved with moderate variability and accelerations   Abdomen: soft, nontender   SVE: 1-2/50/-3     A/P   Patient with short period of cat 3 tracing now resolved to category 1 with maternal position change to left lateral position. Next cytotec due at 530am. Will hold next dose until 6am and give if FHT remains reactive.    Dr. Zamorano aware

## 2019-07-03 NOTE — CHART NOTE - NSCHARTNOTEFT_GEN_A_CORE
Patient doing well    S:   Vital Signs Last 24 Hrs  T(C): 36.9 (02 Jul 2019 22:27), Max: 37.1 (02 Jul 2019 18:06)  T(F): 98.42 (02 Jul 2019 22:27), Max: 98.8 (02 Jul 2019 18:06)  HR: 95 (02 Jul 2019 19:28) (95 - 112)  BP: 142/78 (02 Jul 2019 19:28) (142/78 - 151/79)  RR: 17 (02 Jul 2019 18:06) (17 - 17)    FHT: baseline 130s, moderate variability, accelerations present no decelerations     Abdomen: Soft, nontender   Extrem: no swelling or calf tenderness     SVE: deferred     A/P   Will continue cytotec. Patient on sliding scale, covered 2 units.    Attending aware

## 2019-07-03 NOTE — OB PROVIDER DELIVERY SUMMARY - NSADDITIONALPROC_OBGYN_ALL_OB
Please notify the patient of abnormal results. A1C slightly up at 5.8, glucose 130. Will discuss in detail at upcoming appt. Follow-up as planned.
No

## 2019-07-03 NOTE — DISCHARGE NOTE OB - PATIENT PORTAL LINK FT
You can access the [facility] Patient Portal, offered by [facility], by registering with the following website: [email or web address]

## 2019-07-03 NOTE — OB NEONATOLOGY/PEDIATRICIAN DELIVERY SUMMARY - NSPEDSNEONOTESA_OBGYN_ALL_OB_FT
Michael requested to attend Primary/ Repeat   due to NRFHT and Polyhydramnious by Dr. Cullen. Infant is a 37.3 week F/M born to a  y/o    Blood type O negative, RPR negative,HBsAg negative, HIV negative, and GBS unknown. Maternal history significant for Acid Reflux, Asthma and Type 2 Diabetes on Insuline. Family history noncontributory. Social history denies illicit drug use. Infant born vigorous with spontaneous cry. Routine resuscitation. Apgars 9/9. 3 vessel cord. PE wnl. Bwt   g (GA). Transfer to NBN for routine care under management of PMD. Glucose protocol. Michael requested to attend Primary   due to NRFHT and Polyhydramnious by Dr. Cullen. Infant is a 37.3 week F born to a 37 y/o    Blood type O negative, RPR negative ,HBsAg negative, HIV negative, and GBS unknown. Maternal history significant for Acid Reflux, Asthma and Type 2 Diabetes on Insulin. Family history noncontributory. Social history denies illicit drug use. Infant born vigorous with spontaneous cry. Routine resuscitation. Apgars 9/9. 3 vessel cord. PE wnl. Bwt 3790g (LGA). Transfer to NBN for routine care under management of PMD. Glucose protocol.

## 2019-07-03 NOTE — DISCHARGE NOTE OB - MEDICATION SUMMARY - MEDICATIONS TO TAKE
I will START or STAY ON the medications listed below when I get home from the hospital:    ibuprofen 600 mg oral tablet  -- 1 tab(s) by mouth every 6 hours  -- Indication: For Pain    HumaLOG  -- 25 unit(s) subcutaneous 3 times a day  -- Indication: For Home med    Levemir  -- 50 unit(s) subcutaneous 2 times a day  -- Indication: For Home med    raNITIdine 300 mg oral capsule  -- 1 cap(s) by mouth once a day (at bedtime)  -- Indication: For Home med

## 2019-07-04 LAB
BASOPHILS # BLD AUTO: 0.02 K/UL — SIGNIFICANT CHANGE UP (ref 0–0.2)
BASOPHILS NFR BLD AUTO: 0.2 % — SIGNIFICANT CHANGE UP (ref 0–2)
EOSINOPHIL # BLD AUTO: 0.03 K/UL — SIGNIFICANT CHANGE UP (ref 0–0.5)
EOSINOPHIL NFR BLD AUTO: 0.3 % — SIGNIFICANT CHANGE UP (ref 0–6)
FETAL SCREEN: SIGNIFICANT CHANGE UP
GLUCOSE BLDC GLUCOMTR-MCNC: 113 MG/DL — HIGH (ref 70–99)
GLUCOSE BLDC GLUCOMTR-MCNC: 124 MG/DL — HIGH (ref 70–99)
GLUCOSE BLDC GLUCOMTR-MCNC: 125 MG/DL — HIGH (ref 70–99)
GLUCOSE BLDC GLUCOMTR-MCNC: 161 MG/DL — HIGH (ref 70–99)
GLUCOSE BLDC GLUCOMTR-MCNC: 81 MG/DL — SIGNIFICANT CHANGE UP (ref 70–99)
GLUCOSE BLDC GLUCOMTR-MCNC: 95 MG/DL — SIGNIFICANT CHANGE UP (ref 70–99)
HCT VFR BLD CALC: 32.1 % — LOW (ref 34.5–45)
HGB BLD-MCNC: 10.5 G/DL — LOW (ref 11.5–15.5)
IMM GRANULOCYTES NFR BLD AUTO: 0.9 % — SIGNIFICANT CHANGE UP (ref 0–1.5)
LYMPHOCYTES # BLD AUTO: 1.28 K/UL — SIGNIFICANT CHANGE UP (ref 1–3.3)
LYMPHOCYTES # BLD AUTO: 12.3 % — LOW (ref 13–44)
MCHC RBC-ENTMCNC: 29.8 PG — SIGNIFICANT CHANGE UP (ref 27–34)
MCHC RBC-ENTMCNC: 32.7 GM/DL — SIGNIFICANT CHANGE UP (ref 32–36)
MCV RBC AUTO: 91.2 FL — SIGNIFICANT CHANGE UP (ref 80–100)
MONOCYTES # BLD AUTO: 0.83 K/UL — SIGNIFICANT CHANGE UP (ref 0–0.9)
MONOCYTES NFR BLD AUTO: 8 % — SIGNIFICANT CHANGE UP (ref 2–14)
NEUTROPHILS # BLD AUTO: 8.13 K/UL — HIGH (ref 1.8–7.4)
NEUTROPHILS NFR BLD AUTO: 78.3 % — HIGH (ref 43–77)
PLATELET # BLD AUTO: 209 K/UL — SIGNIFICANT CHANGE UP (ref 150–400)
RBC # BLD: 3.52 M/UL — LOW (ref 3.8–5.2)
RBC # FLD: 14.2 % — SIGNIFICANT CHANGE UP (ref 10.3–14.5)
WBC # BLD: 10.38 K/UL — SIGNIFICANT CHANGE UP (ref 3.8–10.5)
WBC # FLD AUTO: 10.38 K/UL — SIGNIFICANT CHANGE UP (ref 3.8–10.5)

## 2019-07-04 RX ORDER — KETOROLAC TROMETHAMINE 30 MG/ML
30 SYRINGE (ML) INJECTION EVERY 6 HOURS
Refills: 0 | Status: DISCONTINUED | OUTPATIENT
Start: 2019-07-04 | End: 2019-07-05

## 2019-07-04 RX ORDER — IBUPROFEN 200 MG
600 TABLET ORAL EVERY 6 HOURS
Refills: 0 | Status: DISCONTINUED | OUTPATIENT
Start: 2019-07-04 | End: 2019-07-07

## 2019-07-04 RX ORDER — ACETAMINOPHEN 500 MG
650 TABLET ORAL EVERY 6 HOURS
Refills: 0 | Status: DISCONTINUED | OUTPATIENT
Start: 2019-07-04 | End: 2019-07-05

## 2019-07-04 RX ORDER — KETOROLAC TROMETHAMINE 30 MG/ML
30 SYRINGE (ML) INJECTION EVERY 6 HOURS
Refills: 0 | Status: DISCONTINUED | OUTPATIENT
Start: 2019-07-04 | End: 2019-07-04

## 2019-07-04 RX ADMIN — PANTOPRAZOLE SODIUM 40 MILLIGRAM(S): 20 TABLET, DELAYED RELEASE ORAL at 05:54

## 2019-07-04 RX ADMIN — Medication 50 UNIT(S): at 21:41

## 2019-07-04 RX ADMIN — Medication 2: at 10:43

## 2019-07-04 RX ADMIN — Medication 50 UNIT(S): at 09:06

## 2019-07-04 RX ADMIN — Medication 600 MILLIGRAM(S): at 18:00

## 2019-07-04 RX ADMIN — Medication 600 MILLIGRAM(S): at 12:34

## 2019-07-04 RX ADMIN — Medication 1 TABLET(S): at 17:23

## 2019-07-04 RX ADMIN — Medication 30 MILLIGRAM(S): at 00:17

## 2019-07-04 RX ADMIN — Medication 25 UNIT(S): at 09:07

## 2019-07-04 RX ADMIN — Medication 600 MILLIGRAM(S): at 13:03

## 2019-07-04 RX ADMIN — SIMETHICONE 80 MILLIGRAM(S): 80 TABLET, CHEWABLE ORAL at 05:54

## 2019-07-04 RX ADMIN — Medication 30 MILLIGRAM(S): at 06:07

## 2019-07-04 RX ADMIN — Medication 2: at 09:08

## 2019-07-04 RX ADMIN — Medication 4: at 21:42

## 2019-07-04 RX ADMIN — FAMOTIDINE 20 MILLIGRAM(S): 10 INJECTION INTRAVENOUS at 17:23

## 2019-07-04 RX ADMIN — Medication 30 MILLIGRAM(S): at 00:32

## 2019-07-04 RX ADMIN — Medication 600 MILLIGRAM(S): at 18:30

## 2019-07-04 RX ADMIN — Medication 30 MILLIGRAM(S): at 05:52

## 2019-07-04 RX ADMIN — ENOXAPARIN SODIUM 40 MILLIGRAM(S): 100 INJECTION SUBCUTANEOUS at 12:00

## 2019-07-04 RX ADMIN — Medication 100 MILLIGRAM(S): at 20:48

## 2019-07-04 NOTE — PROGRESS NOTE ADULT - SUBJECTIVE AND OBJECTIVE BOX
pod #1 sp cs   pt s co. tolg po  avss  ab-soft, nt, inc dressed w agnieszka, clean.  ext nt  h/h 10/31  a/p stable pod 1. cont care.

## 2019-07-05 ENCOUNTER — APPOINTMENT (OUTPATIENT)
Dept: ANTEPARTUM | Facility: CLINIC | Age: 39
End: 2019-07-05

## 2019-07-05 LAB
GLUCOSE BLDC GLUCOMTR-MCNC: 118 MG/DL — HIGH (ref 70–99)
GLUCOSE BLDC GLUCOMTR-MCNC: 122 MG/DL — HIGH (ref 70–99)
GLUCOSE BLDC GLUCOMTR-MCNC: 135 MG/DL — HIGH (ref 70–99)
GLUCOSE BLDC GLUCOMTR-MCNC: 182 MG/DL — HIGH (ref 70–99)
GLUCOSE BLDC GLUCOMTR-MCNC: 74 MG/DL — SIGNIFICANT CHANGE UP (ref 70–99)
GLUCOSE BLDC GLUCOMTR-MCNC: 76 MG/DL — SIGNIFICANT CHANGE UP (ref 70–99)
GLUCOSE BLDC GLUCOMTR-MCNC: 91 MG/DL — SIGNIFICANT CHANGE UP (ref 70–99)

## 2019-07-05 RX ORDER — ACETAMINOPHEN 500 MG
975 TABLET ORAL EVERY 6 HOURS
Refills: 0 | Status: DISCONTINUED | OUTPATIENT
Start: 2019-07-05 | End: 2019-07-07

## 2019-07-05 RX ADMIN — Medication 1 TABLET(S): at 16:54

## 2019-07-05 RX ADMIN — Medication 975 MILLIGRAM(S): at 10:05

## 2019-07-05 RX ADMIN — Medication 600 MILLIGRAM(S): at 18:37

## 2019-07-05 RX ADMIN — PANTOPRAZOLE SODIUM 40 MILLIGRAM(S): 20 TABLET, DELAYED RELEASE ORAL at 06:26

## 2019-07-05 RX ADMIN — Medication 975 MILLIGRAM(S): at 02:33

## 2019-07-05 RX ADMIN — Medication 600 MILLIGRAM(S): at 00:07

## 2019-07-05 RX ADMIN — ENOXAPARIN SODIUM 40 MILLIGRAM(S): 100 INJECTION SUBCUTANEOUS at 13:16

## 2019-07-05 RX ADMIN — Medication 975 MILLIGRAM(S): at 21:53

## 2019-07-05 RX ADMIN — Medication 600 MILLIGRAM(S): at 13:16

## 2019-07-05 RX ADMIN — Medication 600 MILLIGRAM(S): at 17:37

## 2019-07-05 RX ADMIN — Medication 975 MILLIGRAM(S): at 22:45

## 2019-07-05 RX ADMIN — SIMETHICONE 80 MILLIGRAM(S): 80 TABLET, CHEWABLE ORAL at 17:37

## 2019-07-05 RX ADMIN — Medication 600 MILLIGRAM(S): at 06:50

## 2019-07-05 RX ADMIN — Medication 2: at 22:12

## 2019-07-05 RX ADMIN — Medication 600 MILLIGRAM(S): at 06:27

## 2019-07-05 RX ADMIN — Medication 600 MILLIGRAM(S): at 00:22

## 2019-07-05 RX ADMIN — Medication 975 MILLIGRAM(S): at 03:33

## 2019-07-05 RX ADMIN — Medication 975 MILLIGRAM(S): at 11:05

## 2019-07-05 RX ADMIN — Medication 25 UNIT(S): at 13:55

## 2019-07-05 RX ADMIN — Medication 600 MILLIGRAM(S): at 14:16

## 2019-07-05 NOTE — PROGRESS NOTE ADULT - SUBJECTIVE AND OBJECTIVE BOX
POD# 2  s/p   primary    section; female infant  pt with history of class b dm; on insulin      s: laverne reg diet; pos ambulating; no complaints    vital signs  Vital Signs Last 24 Hrs  T(C): 36.7 (2019 08:13), Max: 37 (2019 00:02)  T(F): 98 (2019 08:13), Max: 98.6 (2019 00:02)  HR: 76 (2019 08:13) (76 - 92)  BP: 124/83 (2019 08:13) (124/83 - 130/84)  BP(mean): --  RR: 20 (2019 08:13) (18 - 20)  SpO2: 98% (2019 00:02) (98% - 98%)    inc:dry dressing in place              cbc:po hct 32

## 2019-07-06 VITALS
SYSTOLIC BLOOD PRESSURE: 145 MMHG | OXYGEN SATURATION: 97 % | RESPIRATION RATE: 20 BRPM | DIASTOLIC BLOOD PRESSURE: 82 MMHG | TEMPERATURE: 98 F | HEART RATE: 91 BPM

## 2019-07-06 RX ORDER — IBUPROFEN 200 MG
1 TABLET ORAL
Qty: 20 | Refills: 0
Start: 2019-07-06 | End: 2019-07-10

## 2019-07-06 RX ORDER — PANTOPRAZOLE SODIUM 20 MG/1
1 TABLET, DELAYED RELEASE ORAL
Qty: 0 | Refills: 0 | DISCHARGE

## 2019-07-06 RX ORDER — OXYCODONE AND ACETAMINOPHEN 5; 325 MG/1; MG/1
1 TABLET ORAL
Qty: 8 | Refills: 0
Start: 2019-07-06 | End: 2019-07-07

## 2019-07-06 RX ADMIN — Medication 975 MILLIGRAM(S): at 10:01

## 2019-07-06 RX ADMIN — Medication 975 MILLIGRAM(S): at 21:50

## 2019-07-06 RX ADMIN — Medication 600 MILLIGRAM(S): at 23:40

## 2019-07-06 RX ADMIN — Medication 975 MILLIGRAM(S): at 03:29

## 2019-07-06 RX ADMIN — Medication 600 MILLIGRAM(S): at 00:02

## 2019-07-06 RX ADMIN — FAMOTIDINE 20 MILLIGRAM(S): 10 INJECTION INTRAVENOUS at 13:27

## 2019-07-06 RX ADMIN — SIMETHICONE 80 MILLIGRAM(S): 80 TABLET, CHEWABLE ORAL at 21:50

## 2019-07-06 RX ADMIN — Medication 600 MILLIGRAM(S): at 18:26

## 2019-07-06 RX ADMIN — ENOXAPARIN SODIUM 40 MILLIGRAM(S): 100 INJECTION SUBCUTANEOUS at 13:28

## 2019-07-06 RX ADMIN — Medication 975 MILLIGRAM(S): at 22:50

## 2019-07-06 RX ADMIN — Medication 600 MILLIGRAM(S): at 01:00

## 2019-07-06 RX ADMIN — Medication 975 MILLIGRAM(S): at 15:56

## 2019-07-06 RX ADMIN — Medication 975 MILLIGRAM(S): at 04:15

## 2019-07-06 RX ADMIN — Medication 600 MILLIGRAM(S): at 06:07

## 2019-07-06 RX ADMIN — Medication 600 MILLIGRAM(S): at 06:54

## 2019-07-06 RX ADMIN — Medication 1 TABLET(S): at 13:28

## 2019-07-06 RX ADMIN — Medication 100 MILLIGRAM(S): at 21:50

## 2019-07-06 RX ADMIN — Medication 600 MILLIGRAM(S): at 13:27

## 2019-07-06 NOTE — PROGRESS NOTE ADULT - SUBJECTIVE AND OBJECTIVE BOX
POD# 3  s/p  primary     section    s: no complaints; wants to go home    vital signs  Vital Signs Last 24 Hrs  T(C): 36.9 (2019 19:52), Max: 36.9 (2019 19:52)  T(F): 98.4 (2019 19:52), Max: 98.4 (2019 19:52)  HR: 82 (2019 19:52) (76 - 82)  BP: 145/87 (2019 19:52) (124/83 - 145/87)  BP(mean): --  RR: 18 (2019 19:52) (18 - 20)  SpO2: 98% (2019 19:52) (98% - 98%)        inc: dry dressing in place

## 2019-07-07 PROCEDURE — 59025 FETAL NON-STRESS TEST: CPT

## 2019-07-07 PROCEDURE — 81001 URINALYSIS AUTO W/SCOPE: CPT

## 2019-07-07 PROCEDURE — 86886 COOMBS TEST INDIRECT TITER: CPT

## 2019-07-07 PROCEDURE — 86870 RBC ANTIBODY IDENTIFICATION: CPT

## 2019-07-07 PROCEDURE — 85027 COMPLETE CBC AUTOMATED: CPT

## 2019-07-07 PROCEDURE — 36415 COLL VENOUS BLD VENIPUNCTURE: CPT

## 2019-07-07 PROCEDURE — 86880 COOMBS TEST DIRECT: CPT

## 2019-07-07 PROCEDURE — 76818 FETAL BIOPHYS PROFILE W/NST: CPT

## 2019-07-07 PROCEDURE — 82962 GLUCOSE BLOOD TEST: CPT

## 2019-07-07 PROCEDURE — 86780 TREPONEMA PALLIDUM: CPT

## 2019-07-07 PROCEDURE — 59050 FETAL MONITOR W/REPORT: CPT

## 2019-07-07 PROCEDURE — C1765: CPT

## 2019-07-07 PROCEDURE — 85461 HEMOGLOBIN FETAL: CPT

## 2019-07-07 PROCEDURE — 86850 RBC ANTIBODY SCREEN: CPT

## 2019-07-07 PROCEDURE — 86900 BLOOD TYPING SEROLOGIC ABO: CPT

## 2019-07-07 PROCEDURE — 83036 HEMOGLOBIN GLYCOSYLATED A1C: CPT

## 2019-07-07 PROCEDURE — 80053 COMPREHEN METABOLIC PANEL: CPT

## 2019-07-07 PROCEDURE — 86901 BLOOD TYPING SEROLOGIC RH(D): CPT

## 2019-07-07 RX ADMIN — FAMOTIDINE 20 MILLIGRAM(S): 10 INJECTION INTRAVENOUS at 15:39

## 2019-07-07 RX ADMIN — Medication 975 MILLIGRAM(S): at 04:31

## 2019-07-07 RX ADMIN — PANTOPRAZOLE SODIUM 40 MILLIGRAM(S): 20 TABLET, DELAYED RELEASE ORAL at 06:49

## 2019-07-07 RX ADMIN — SIMETHICONE 80 MILLIGRAM(S): 80 TABLET, CHEWABLE ORAL at 06:45

## 2019-07-07 RX ADMIN — Medication 975 MILLIGRAM(S): at 10:09

## 2019-07-07 RX ADMIN — Medication 600 MILLIGRAM(S): at 12:14

## 2019-07-07 RX ADMIN — Medication 975 MILLIGRAM(S): at 15:39

## 2019-07-07 RX ADMIN — Medication 600 MILLIGRAM(S): at 06:49

## 2019-07-07 RX ADMIN — Medication 1 TABLET(S): at 12:14

## 2019-07-07 RX ADMIN — Medication 600 MILLIGRAM(S): at 00:40

## 2019-07-07 RX ADMIN — Medication 100 MILLIGRAM(S): at 06:49

## 2019-07-07 RX ADMIN — ENOXAPARIN SODIUM 40 MILLIGRAM(S): 100 INJECTION SUBCUTANEOUS at 12:14

## 2019-07-09 ENCOUNTER — APPOINTMENT (OUTPATIENT)
Dept: ANTEPARTUM | Facility: CLINIC | Age: 39
End: 2019-07-09

## 2019-07-10 ENCOUNTER — APPOINTMENT (OUTPATIENT)
Dept: OBGYN | Facility: CLINIC | Age: 39
End: 2019-07-10
Payer: COMMERCIAL

## 2019-07-10 VITALS — HEIGHT: 66 IN | SYSTOLIC BLOOD PRESSURE: 120 MMHG | DIASTOLIC BLOOD PRESSURE: 80 MMHG

## 2019-07-10 PROBLEM — J45.909 UNSPECIFIED ASTHMA, UNCOMPLICATED: Chronic | Status: ACTIVE | Noted: 2019-07-02

## 2019-07-10 PROBLEM — K21.9 GASTRO-ESOPHAGEAL REFLUX DISEASE WITHOUT ESOPHAGITIS: Chronic | Status: ACTIVE | Noted: 2019-07-02

## 2019-07-10 PROCEDURE — 99213 OFFICE O/P EST LOW 20 MIN: CPT

## 2019-07-11 ENCOUNTER — APPOINTMENT (OUTPATIENT)
Dept: CT IMAGING | Facility: CLINIC | Age: 39
End: 2019-07-11

## 2019-07-11 ENCOUNTER — OUTPATIENT (OUTPATIENT)
Dept: OUTPATIENT SERVICES | Facility: HOSPITAL | Age: 39
LOS: 1 days | End: 2019-07-11
Payer: COMMERCIAL

## 2019-07-11 DIAGNOSIS — Z00.8 ENCOUNTER FOR OTHER GENERAL EXAMINATION: ICD-10-CM

## 2019-07-11 DIAGNOSIS — Z98.890 OTHER SPECIFIED POSTPROCEDURAL STATES: Chronic | ICD-10-CM

## 2019-07-11 PROCEDURE — 74176 CT ABD & PELVIS W/O CONTRAST: CPT | Mod: 26

## 2019-07-11 PROCEDURE — 74176 CT ABD & PELVIS W/O CONTRAST: CPT

## 2019-07-12 ENCOUNTER — APPOINTMENT (OUTPATIENT)
Dept: ANTEPARTUM | Facility: CLINIC | Age: 39
End: 2019-07-12

## 2019-07-16 LAB — BACTERIA SPEC CULT: NORMAL

## 2019-07-17 ENCOUNTER — APPOINTMENT (OUTPATIENT)
Dept: OBGYN | Facility: CLINIC | Age: 39
End: 2019-07-17
Payer: COMMERCIAL

## 2019-07-17 VITALS
SYSTOLIC BLOOD PRESSURE: 140 MMHG | BODY MASS INDEX: 47.09 KG/M2 | WEIGHT: 293 LBS | DIASTOLIC BLOOD PRESSURE: 90 MMHG | HEIGHT: 66 IN

## 2019-07-17 PROCEDURE — 99213 OFFICE O/P EST LOW 20 MIN: CPT

## 2019-07-29 NOTE — CHART NOTE - NSCHARTNOTEFT_GEN_A_CORE
Patient is a 38 yo female, blood type O RH negative with a positive antibody screen.   The antibody identification demonstrates the presence of a weakly reactive Anti-D. This reactivity pattern is most consistent with passive immunity secondary to RH Immune Globulin injection received on 07/02/2019. Although it is not always possible to exclude the presence of an allo-Anti-D, RH immune globulin can remain in the circulation for a period up to four months following injection. No other clinically significant red cell antibodies are identified. HOLLY is negative. Z01.84

## 2019-08-12 ENCOUNTER — APPOINTMENT (OUTPATIENT)
Dept: OBGYN | Facility: CLINIC | Age: 39
End: 2019-08-12
Payer: COMMERCIAL

## 2019-08-12 VITALS
SYSTOLIC BLOOD PRESSURE: 136 MMHG | WEIGHT: 257 LBS | DIASTOLIC BLOOD PRESSURE: 89 MMHG | HEART RATE: 83 BPM | HEIGHT: 66 IN | BODY MASS INDEX: 41.3 KG/M2

## 2019-08-12 PROCEDURE — 0503F POSTPARTUM CARE VISIT: CPT

## 2019-08-12 NOTE — HISTORY OF PRESENT ILLNESS
[Clean/Dry/Intact] : clean, dry and intact [None] : no vaginal bleeding [Back to Normal] : is back to normal in size [Cervix Sample Taken] : cervical sample taken for a Pap smear [Normal] : the vagina was normal [FreeTextEntry8] : 39 year old female presents for pp visit.  She is feeling well.  wound care has been coming 3 times per week to pack her incision and she states it is healing well.  She denies fevers.  She is bottlefeeding her baby.  She had a period 1 week ago.  No intercourse since delivery.  She denies symptoms of pp depression.  She would like another Mirena IUD placed. [Examination Of The Breasts] : breasts are normal [de-identified] : 39 year old female s/p primary CS with wound dehiscence followed by wound care, doing well [de-identified] : Incision with 1 cm opening, packed with aquacel and dressing placed [de-identified] : Ok to return to normal activity, intercourse and exercise. Wound packed today and dressing placed.  Healing well.  She will continue wound care at home.  Pap done.  She would like to have another Mirena IUD placed.  +/- d/w patient.  Will premedicate with cytotec.  Will call with next menses for insertion. Call parameters reviewed.

## 2019-08-27 LAB
CYTOLOGY CVX/VAG DOC THIN PREP: NORMAL
HPV HIGH+LOW RISK DNA PNL CVX: NOT DETECTED

## 2019-09-05 ENCOUNTER — APPOINTMENT (OUTPATIENT)
Dept: OBGYN | Facility: CLINIC | Age: 39
End: 2019-09-05
Payer: COMMERCIAL

## 2019-09-05 VITALS
WEIGHT: 255 LBS | HEIGHT: 66 IN | SYSTOLIC BLOOD PRESSURE: 136 MMHG | BODY MASS INDEX: 40.98 KG/M2 | DIASTOLIC BLOOD PRESSURE: 84 MMHG

## 2019-09-05 PROCEDURE — 58300 INSERT INTRAUTERINE DEVICE: CPT

## 2019-09-05 PROCEDURE — 81025 URINE PREGNANCY TEST: CPT

## 2019-09-05 NOTE — ASSESSMENT
[FreeTextEntry1] : Nothing in the vagina for 3 days.  Motrin every 6 hours as needed for discomfort.  Call parameters reviewed.  RTO in 6 weeks for sono to confirm placement.

## 2019-09-05 NOTE — PROCEDURE
[IUD Placement] : intrauterine device (IUD) placement [Prevention of Pregnancy] : prevention of pregnancy [Risks] : risks [Benefits] : benefits [Alternatives] : alternatives [Patient] : patient [Infection] : infection [Bleeding] : bleeding [Pain] : pain [Expulsion] : expulsion [Failure] : failure [Uterine Perforation] : uterine perforation [LMP ___] : LMP was [unfilled] [Neg Pregnancy Test] : a pregnancy test was negative [None] : none [Tenaculum] : a single toothed tenaculum [Easy Passage] : allowed easy passage of a uterine sound without dilation [Mirena IUD] : The Mirena IUD was inserted past the internal cervical os. The IUD was then gently inserted upwards toward the fundus.  The IUD strings were cut to an appropriate length. [Tolerated Well] : the patient tolerated the procedure well [No Complications] : there were no complications [Motrin/Ibuprofen] : Motrin/Ibuprofen [de-identified] : Cytotec

## 2019-09-08 LAB — HCG UR QL: NEGATIVE

## 2019-09-26 NOTE — OB PROVIDER TRIAGE NOTE - NS_FHRACCEL_OBGYN_ALL_OB
Present (15 x15 bpm) Skyrizi Counseling: I discussed with the patient the risks of risankizumab-rzaa including but not limited to immunosuppression, and serious infections.  The patient understands that monitoring is required including a PPD at baseline and must alert us or the primary physician if symptoms of infection or other concerning signs are noted.

## 2019-10-22 ENCOUNTER — APPOINTMENT (OUTPATIENT)
Dept: OBGYN | Facility: CLINIC | Age: 39
End: 2019-10-22
Payer: COMMERCIAL

## 2019-10-22 ENCOUNTER — ASOB RESULT (OUTPATIENT)
Age: 39
End: 2019-10-22

## 2019-10-22 VITALS
DIASTOLIC BLOOD PRESSURE: 90 MMHG | WEIGHT: 252 LBS | SYSTOLIC BLOOD PRESSURE: 134 MMHG | HEIGHT: 66 IN | BODY MASS INDEX: 40.5 KG/M2

## 2019-10-22 PROCEDURE — 99214 OFFICE O/P EST MOD 30 MIN: CPT

## 2019-10-22 PROCEDURE — 76856 US EXAM PELVIC COMPLETE: CPT | Mod: 59

## 2019-10-22 PROCEDURE — 76830 TRANSVAGINAL US NON-OB: CPT

## 2019-10-22 NOTE — PHYSICAL EXAM
[No Bleeding] : there was no active vaginal bleeding [Labia Majora] : labia major [Normal] : uterus [IUD String] : had an IUD string protruding out [Tenderness] : nontender

## 2020-10-30 NOTE — OB RN DELIVERY SUMMARY - NSDELIVERYTYPEA_OBGYN_ALL_OB
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 The surgical margins are positive for tumor cells.

## 2021-01-26 ENCOUNTER — APPOINTMENT (OUTPATIENT)
Dept: OBGYN | Facility: CLINIC | Age: 41
End: 2021-01-26
Payer: COMMERCIAL

## 2021-01-26 VITALS
BODY MASS INDEX: 38.09 KG/M2 | HEART RATE: 80 BPM | SYSTOLIC BLOOD PRESSURE: 117 MMHG | HEIGHT: 66 IN | WEIGHT: 237 LBS | DIASTOLIC BLOOD PRESSURE: 84 MMHG

## 2021-01-26 PROCEDURE — 99395 PREV VISIT EST AGE 18-39: CPT

## 2021-01-26 PROCEDURE — 99072 ADDL SUPL MATRL&STAF TM PHE: CPT

## 2021-01-26 NOTE — HISTORY OF PRESENT ILLNESS
[ColonoscopyDate] : 2019 [PGxTotal] : 1 [facility] [FreeTextEntry1] :

## 2021-01-26 NOTE — PHYSICAL EXAM
[Appropriately responsive] : appropriately responsive [Alert] : alert [No Acute Distress] : no acute distress [No Lymphadenopathy] : no lymphadenopathy [Soft] : soft [Non-tender] : non-tender [No HSM] : No HSM [Non-distended] : non-distended [No Lesions] : no lesions [No Mass] : no mass [Oriented x3] : oriented x3 [Examination Of The Breasts] : a normal appearance [No Masses] : no breast masses were palpable [Labia Majora] : normal [Labia Minora] : normal [IUD String] : an IUD string was noted [Normal] : normal [Uterine Adnexae] : normal

## 2021-01-28 ENCOUNTER — TRANSCRIPTION ENCOUNTER (OUTPATIENT)
Age: 41
End: 2021-01-28

## 2021-01-29 LAB — HPV HIGH+LOW RISK DNA PNL CVX: NOT DETECTED

## 2021-01-30 LAB — CYTOLOGY CVX/VAG DOC THIN PREP: NORMAL

## 2021-07-06 ENCOUNTER — NON-APPOINTMENT (OUTPATIENT)
Age: 41
End: 2021-07-06

## 2021-07-26 ENCOUNTER — APPOINTMENT (OUTPATIENT)
Dept: OBGYN | Facility: CLINIC | Age: 41
End: 2021-07-26
Payer: COMMERCIAL

## 2021-07-26 VITALS
SYSTOLIC BLOOD PRESSURE: 126 MMHG | HEIGHT: 66 IN | BODY MASS INDEX: 39.21 KG/M2 | HEART RATE: 86 BPM | WEIGHT: 244 LBS | DIASTOLIC BLOOD PRESSURE: 76 MMHG

## 2021-07-26 PROCEDURE — 99072 ADDL SUPL MATRL&STAF TM PHE: CPT

## 2021-07-26 PROCEDURE — 58301 REMOVE INTRAUTERINE DEVICE: CPT

## 2021-07-26 NOTE — PROCEDURE
[IUD Removal] : intrauterine device (IUD) removal [Fertility Desired] : fertility desired [Risks] : risks [Benefits] : benefits [Alternatives] : alternatives [Patient] : patient [Speculum Placed] : speculum placed [IUD Removed - Forceps] : IUD removed - forceps [IUD Discarded] : IUD discarded [Tolerated Well] : Patient tolerated the procedure well [No Complications] : no complications

## 2021-07-26 NOTE — ASSESSMENT
[FreeTextEntry1] : Call parameters reviewed.  Patient counseled.  RTO with + ucg or sooner if needed.

## 2021-10-12 ENCOUNTER — APPOINTMENT (OUTPATIENT)
Dept: OBGYN | Facility: CLINIC | Age: 41
End: 2021-10-12
Payer: COMMERCIAL

## 2021-10-12 ENCOUNTER — ASOB RESULT (OUTPATIENT)
Age: 41
End: 2021-10-12

## 2021-10-12 ENCOUNTER — APPOINTMENT (OUTPATIENT)
Dept: ANTEPARTUM | Facility: CLINIC | Age: 41
End: 2021-10-12
Payer: COMMERCIAL

## 2021-10-12 VITALS
DIASTOLIC BLOOD PRESSURE: 85 MMHG | BODY MASS INDEX: 42.43 KG/M2 | SYSTOLIC BLOOD PRESSURE: 134 MMHG | HEART RATE: 83 BPM | HEIGHT: 66 IN | WEIGHT: 264 LBS

## 2021-10-12 DIAGNOSIS — T81.30XA DISRUPTION OF WOUND, UNSPECIFIED, INITIAL ENCOUNTER: ICD-10-CM

## 2021-10-12 DIAGNOSIS — Z97.5 PRESENCE OF (INTRAUTERINE) CONTRACEPTIVE DEVICE: ICD-10-CM

## 2021-10-12 PROCEDURE — 99213 OFFICE O/P EST LOW 20 MIN: CPT

## 2021-10-12 PROCEDURE — 76817 TRANSVAGINAL US OBSTETRIC: CPT

## 2021-10-12 NOTE — HISTORY OF PRESENT ILLNESS
[FreeTextEntry1] : 41 year old female  at 8 weeks by LMP of 2021 presents for + ucg at home.  She is complaining of nausea.  She states the nausea lasts all day.  She is not vomiting.  She took diclegis with her first pregnancy and is interested in starting this again.  She has not found any dietary modifications that have helped thus far.  She denies vaginal bleeding and cramping.  She has a history of type 2 DM and is followed by endocrinology.  She plans to make a consult with [facility].  Her PMH also includes asthma and GERD.  PSH is significant for a CS (with wound dehiscence), D&C, laparoscopy x3, right knee surgery, right breast lumpectomy and cyst removal from her scalp.  She is allergic to PCN.  She takes pnv, omeprazole, rescue inhaler prn, and insulin.

## 2021-10-12 NOTE — PLAN
[FreeTextEntry1] : 41 year old female  at 8 weeks confirmed by today's sono with EDC of 2022.  Do's and don'ts of pregnancy discussed with the patient and her .  We discussed nausea and vomiting in pregnancy.  She would like to start diclegis again.  Rx called in.  We also discussed dietary modifications that can help.  We discussed her DM2.  She is currently under good control with her endo.  She will continue follow up with endo and will also set up a MFM consultation.  We discussed asthma in pregnancy.  She can continue omeprazole for GERD (counseled on omeprazole in pregnancy).  We also discussed that she is AMA and should start a baby ASA daily.  Call parameters reviewed.  The patient was given the opportunity to ask questions and all were answered to her satisfaction.  Will RTO in 2 weeks for sono and pn1 visit.

## 2021-10-26 ENCOUNTER — APPOINTMENT (OUTPATIENT)
Dept: ANTEPARTUM | Facility: CLINIC | Age: 41
End: 2021-10-26

## 2021-10-26 ENCOUNTER — APPOINTMENT (OUTPATIENT)
Dept: MATERNAL FETAL MEDICINE | Facility: CLINIC | Age: 41
End: 2021-10-26
Payer: COMMERCIAL

## 2021-10-26 ENCOUNTER — NON-APPOINTMENT (OUTPATIENT)
Age: 41
End: 2021-10-26

## 2021-10-26 VITALS
WEIGHT: 268 LBS | HEIGHT: 65 IN | OXYGEN SATURATION: 98 % | DIASTOLIC BLOOD PRESSURE: 70 MMHG | BODY MASS INDEX: 44.65 KG/M2 | SYSTOLIC BLOOD PRESSURE: 130 MMHG | HEART RATE: 97 BPM | RESPIRATION RATE: 18 BRPM

## 2021-10-26 DIAGNOSIS — O21.9 VOMITING OF PREGNANCY, UNSPECIFIED: ICD-10-CM

## 2021-10-26 DIAGNOSIS — Z87.42 PERSONAL HISTORY OF OTHER DISEASES OF THE FEMALE GENITAL TRACT: ICD-10-CM

## 2021-10-26 DIAGNOSIS — Z30.431 ENCOUNTER FOR ROUTINE CHECKING OF INTRAUTERINE CONTRACEPTIVE DEVICE: ICD-10-CM

## 2021-10-26 DIAGNOSIS — Z32.01 ENCOUNTER FOR PREGNANCY TEST, RESULT POSITIVE: ICD-10-CM

## 2021-10-26 DIAGNOSIS — Z31.69 ENCOUNTER FOR OTHER GENERAL COUNSELING AND ADVICE ON PROCREATION: ICD-10-CM

## 2021-10-26 DIAGNOSIS — Z30.430 ENCOUNTER FOR INSERTION OF INTRAUTERINE CONTRACEPTIVE DEVICE: ICD-10-CM

## 2021-10-26 DIAGNOSIS — O35.9XX0 MATERNAL CARE FOR (SUSPECTED) FETAL ABNORMALITY AND DAMAGE, UNSPECIFIED, NOT APPLICABLE OR UNSPECIFIED: ICD-10-CM

## 2021-10-26 DIAGNOSIS — O35.1XX0 MATERNAL CARE FOR (SUSPECTED) CHROMOSOMAL ABNORMALITY IN FETUS, NOT APPLICABLE OR UNSPECIFIED: ICD-10-CM

## 2021-10-26 DIAGNOSIS — Z01.419 ENCOUNTER FOR GYNECOLOGICAL EXAMINATION (GENERAL) (ROUTINE) W/OUT ABNORMAL FINDINGS: ICD-10-CM

## 2021-10-26 DIAGNOSIS — O24.112 PRE-EXISTING TYPE 2 DIABETES MELLITUS, IN PREGNANCY, SECOND TRIMESTER: ICD-10-CM

## 2021-10-26 DIAGNOSIS — O24.111 PRE-EXISTING TYPE 2 DIABETES MELLITUS, IN PREGNANCY, FIRST TRIMESTER: ICD-10-CM

## 2021-10-26 DIAGNOSIS — N94.89 OTHER SPECIFIED CONDITIONS ASSOCIATED WITH FEMALE GENITAL ORGANS AND MENSTRUAL CYCLE: ICD-10-CM

## 2021-10-26 PROCEDURE — 99214 OFFICE O/P EST MOD 30 MIN: CPT

## 2021-10-26 RX ORDER — ONDANSETRON 4 MG/1
4 TABLET, ORALLY DISINTEGRATING ORAL
Qty: 30 | Refills: 0 | Status: DISCONTINUED | COMMUNITY
Start: 2019-05-02 | End: 2021-10-26

## 2021-10-26 RX ORDER — INSULIN DETEMIR 100 [IU]/ML
100 INJECTION, SOLUTION SUBCUTANEOUS
Refills: 0 | Status: DISCONTINUED | COMMUNITY
End: 2021-10-26

## 2021-10-26 RX ORDER — MISOPROSTOL 200 UG/1
200 TABLET ORAL
Qty: 3 | Refills: 0 | Status: DISCONTINUED | COMMUNITY
Start: 2019-08-12 | End: 2021-10-26

## 2021-10-26 RX ORDER — SULFAMETHOXAZOLE AND TRIMETHOPRIM 800; 160 MG/1; MG/1
800-160 TABLET ORAL TWICE DAILY
Qty: 14 | Refills: 0 | Status: DISCONTINUED | COMMUNITY
Start: 2019-07-10 | End: 2021-10-26

## 2021-10-26 RX ORDER — CRANBERRY FRUIT EXTRACT 650 MG
50 MCG CAPSULE ORAL
Refills: 0 | Status: DISCONTINUED | COMMUNITY
End: 2021-10-26

## 2021-10-26 NOTE — DATA REVIEWED
[FreeTextEntry1] : Sinai has been diabetic for many years and is under the care of an endocrinologist, who she will continue to see and be managed by him. This was the arrangement we had with her prior pregnancy as well. \par \par She is currently having continuous glucose moniitoring which shows most values within range, however she is not testing regularly at specific meal times. She denies any issues with hypo or hyperglycemia. \par \par She is currently taking 70u Long acting insulin in the AM and on Humalog sliding scale during the day. \par \par She is scheduled for her Ultrascreen, and we made an appointment for genetic counseling as well\par \par She is aware of the need for increased surveillance due to both the diabetes as well as her age.

## 2021-10-26 NOTE — DISCUSSION/SUMMARY
[FreeTextEntry1] : Ultrascreen at 12 weeks. \par \par Genetic counseling is scheduled. \par \par Level 2 sonogram at 20 weeks. \par \par Fetal echo is recommended due to pregestational diabetes.

## 2021-10-26 NOTE — HISTORY OF PRESENT ILLNESS
[FreeTextEntry1] : Sinai presents to discuss her type 2 diabetes as well as issues due to her maternal age. \par \par

## 2021-10-29 ENCOUNTER — APPOINTMENT (OUTPATIENT)
Dept: OBGYN | Facility: CLINIC | Age: 41
End: 2021-10-29
Payer: COMMERCIAL

## 2021-10-29 VITALS
DIASTOLIC BLOOD PRESSURE: 87 MMHG | HEIGHT: 65 IN | SYSTOLIC BLOOD PRESSURE: 129 MMHG | BODY MASS INDEX: 44.48 KG/M2 | WEIGHT: 267 LBS

## 2021-10-29 DIAGNOSIS — Z87.19 PERSONAL HISTORY OF OTHER DISEASES OF THE DIGESTIVE SYSTEM: ICD-10-CM

## 2021-10-29 DIAGNOSIS — Z23 ENCOUNTER FOR IMMUNIZATION: ICD-10-CM

## 2021-10-29 PROCEDURE — 90471 IMMUNIZATION ADMIN: CPT

## 2021-10-29 PROCEDURE — 0501F PRENATAL FLOW SHEET: CPT

## 2021-10-29 PROCEDURE — 90686 IIV4 VACC NO PRSV 0.5 ML IM: CPT

## 2021-11-04 ENCOUNTER — LABORATORY RESULT (OUTPATIENT)
Age: 41
End: 2021-11-04

## 2021-11-04 ENCOUNTER — APPOINTMENT (OUTPATIENT)
Dept: OBGYN | Facility: CLINIC | Age: 41
End: 2021-11-04
Payer: COMMERCIAL

## 2021-11-04 VITALS
HEIGHT: 65 IN | DIASTOLIC BLOOD PRESSURE: 87 MMHG | SYSTOLIC BLOOD PRESSURE: 129 MMHG | BODY MASS INDEX: 43.82 KG/M2 | WEIGHT: 263 LBS

## 2021-11-04 DIAGNOSIS — Z3A.10 10 WEEKS GESTATION OF PREGNANCY: ICD-10-CM

## 2021-11-04 PROCEDURE — 76817 TRANSVAGINAL US OBSTETRIC: CPT

## 2021-11-04 PROCEDURE — 99214 OFFICE O/P EST MOD 30 MIN: CPT | Mod: 25,24
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
## 2021-11-04 NOTE — PROCEDURE
[Transabdominal OB Sonogram] : Transabdominal OB Sonogram [Intrauterine Pregnancy] : intrauterine pregnancy [Fetal Heart] : fetal heart present [Transabdominal OB Sonogram WNL] : Transabdominal OB Sonogram WNL [FreeTextEntry1] : Fetal heart rate of 169, adequate fetal movement, adequate amniotic fluid.  No signs of hemorrhage or placental separation.

## 2021-11-10 ENCOUNTER — APPOINTMENT (OUTPATIENT)
Dept: ANTEPARTUM | Facility: CLINIC | Age: 41
End: 2021-11-10
Payer: COMMERCIAL

## 2021-11-10 ENCOUNTER — ASOB RESULT (OUTPATIENT)
Age: 41
End: 2021-11-10

## 2021-11-10 PROCEDURE — ZZZZZ: CPT

## 2021-11-10 PROCEDURE — 76813 OB US NUCHAL MEAS 1 GEST: CPT

## 2021-11-10 PROCEDURE — 36416 COLLJ CAPILLARY BLOOD SPEC: CPT

## 2021-11-11 ENCOUNTER — APPOINTMENT (OUTPATIENT)
Dept: MATERNAL FETAL MEDICINE | Facility: CLINIC | Age: 41
End: 2021-11-11
Payer: COMMERCIAL

## 2021-11-11 ENCOUNTER — ASOB RESULT (OUTPATIENT)
Age: 41
End: 2021-11-11

## 2021-11-11 PROCEDURE — 99202 OFFICE O/P NEW SF 15 MIN: CPT | Mod: 95

## 2021-11-11 PROCEDURE — 99212 OFFICE O/P EST SF 10 MIN: CPT | Mod: 95

## 2021-11-16 ENCOUNTER — NON-APPOINTMENT (OUTPATIENT)
Age: 41
End: 2021-11-16

## 2021-11-22 ENCOUNTER — NON-APPOINTMENT (OUTPATIENT)
Age: 41
End: 2021-11-22

## 2021-11-22 LAB
CLARI ADDITIONAL INFO: NORMAL
CLARI CHROMOSOME 13: NORMAL
CLARI CHROMOSOME 18: NORMAL
CLARI CHROMOSOME 21: NORMAL
CLARI SEX CHROMOSOMES: NORMAL
CLARITEST NIPT: NORMAL
MATERNAL WEIGHT (LBS):: 267
PLEASE INCLUDE GENDER RESULTS ON THIS REPORT:: NORMAL
TYPE OF PREGNANCY:: NORMAL

## 2021-11-23 ENCOUNTER — NON-APPOINTMENT (OUTPATIENT)
Age: 41
End: 2021-11-23

## 2021-11-24 ENCOUNTER — APPOINTMENT (OUTPATIENT)
Dept: OBGYN | Facility: CLINIC | Age: 41
End: 2021-11-24
Payer: COMMERCIAL

## 2021-11-24 VITALS
SYSTOLIC BLOOD PRESSURE: 122 MMHG | BODY MASS INDEX: 44.82 KG/M2 | HEIGHT: 65 IN | DIASTOLIC BLOOD PRESSURE: 80 MMHG | WEIGHT: 269 LBS

## 2021-11-24 PROCEDURE — 0502F SUBSEQUENT PRENATAL CARE: CPT

## 2021-12-08 ENCOUNTER — APPOINTMENT (OUTPATIENT)
Dept: ANTEPARTUM | Facility: CLINIC | Age: 41
End: 2021-12-08
Payer: COMMERCIAL

## 2021-12-08 LAB
1ST TRIMESTER DATA: NORMAL
ADDENDUM DOC: NORMAL
AFP PNL SERPL: NORMAL
AFP SERPL-ACNC: NORMAL
CLINICAL BIOCHEMIST REVIEW: NORMAL
FREE BETA HCG 1ST TRIMESTER: NORMAL
Lab: NORMAL
NASAL BONE: PRESENT
NOTES NTD: NORMAL
NT: NORMAL
PAPP-A SERPL-ACNC: NORMAL
TRISOMY 18/3: NORMAL

## 2021-12-08 PROCEDURE — 36415 COLL VENOUS BLD VENIPUNCTURE: CPT

## 2021-12-13 LAB
1ST TRIMESTER DATA: NORMAL
2ND TRIMESTER DATA: NORMAL
AFP PNL SERPL: NORMAL
AFP SERPL-ACNC: NORMAL
AFP SERPL-ACNC: NORMAL
B-HCG FREE SERPL-MCNC: NORMAL
CLINICAL BIOCHEMIST REVIEW: NORMAL
FREE BETA HCG 1ST TRIMESTER: NORMAL
INHIBIN A SERPL-MCNC: NORMAL
NASAL BONE: PRESENT
NOTES NTD: NORMAL
NT: NORMAL
PAPP-A SERPL-ACNC: NORMAL
U ESTRIOL SERPL-SCNC: NORMAL

## 2021-12-14 ENCOUNTER — NON-APPOINTMENT (OUTPATIENT)
Age: 41
End: 2021-12-14

## 2021-12-15 ENCOUNTER — LABORATORY RESULT (OUTPATIENT)
Age: 41
End: 2021-12-15

## 2021-12-20 ENCOUNTER — APPOINTMENT (OUTPATIENT)
Dept: OBGYN | Facility: CLINIC | Age: 41
End: 2021-12-20
Payer: COMMERCIAL

## 2021-12-20 VITALS
SYSTOLIC BLOOD PRESSURE: 120 MMHG | DIASTOLIC BLOOD PRESSURE: 70 MMHG | BODY MASS INDEX: 44.65 KG/M2 | HEIGHT: 65 IN | WEIGHT: 268 LBS

## 2021-12-20 PROCEDURE — 0502F SUBSEQUENT PRENATAL CARE: CPT

## 2022-01-07 ENCOUNTER — APPOINTMENT (OUTPATIENT)
Dept: ANTEPARTUM | Facility: CLINIC | Age: 42
End: 2022-01-07
Payer: COMMERCIAL

## 2022-01-07 ENCOUNTER — ASOB RESULT (OUTPATIENT)
Age: 42
End: 2022-01-07

## 2022-01-07 PROCEDURE — 76811 OB US DETAILED SNGL FETUS: CPT

## 2022-01-07 PROCEDURE — 76817 TRANSVAGINAL US OBSTETRIC: CPT

## 2022-01-14 ENCOUNTER — APPOINTMENT (OUTPATIENT)
Dept: PEDIATRIC CARDIOLOGY | Facility: CLINIC | Age: 42
End: 2022-01-14
Payer: COMMERCIAL

## 2022-01-14 LAB
ABO + RH PNL BLD: NORMAL
BLD GP AB SCN SERPL QL: NORMAL

## 2022-01-14 PROCEDURE — 99203 OFFICE O/P NEW LOW 30 MIN: CPT

## 2022-01-14 PROCEDURE — 76825 ECHO EXAM OF FETAL HEART: CPT

## 2022-01-14 PROCEDURE — 76820 UMBILICAL ARTERY ECHO: CPT

## 2022-01-14 PROCEDURE — 76827 ECHO EXAM OF FETAL HEART: CPT

## 2022-01-14 PROCEDURE — 93325 DOPPLER ECHO COLOR FLOW MAPG: CPT | Mod: 59

## 2022-01-14 PROCEDURE — 76821 MIDDLE CEREBRAL ARTERY ECHO: CPT

## 2022-01-20 ENCOUNTER — NON-APPOINTMENT (OUTPATIENT)
Age: 42
End: 2022-01-20

## 2022-01-21 ENCOUNTER — APPOINTMENT (OUTPATIENT)
Dept: OBGYN | Facility: CLINIC | Age: 42
End: 2022-01-21
Payer: COMMERCIAL

## 2022-01-21 VITALS
SYSTOLIC BLOOD PRESSURE: 110 MMHG | HEIGHT: 65 IN | WEIGHT: 272 LBS | BODY MASS INDEX: 45.32 KG/M2 | DIASTOLIC BLOOD PRESSURE: 70 MMHG

## 2022-01-21 PROCEDURE — 0502F SUBSEQUENT PRENATAL CARE: CPT

## 2022-02-04 ENCOUNTER — APPOINTMENT (OUTPATIENT)
Dept: ANTEPARTUM | Facility: CLINIC | Age: 42
End: 2022-02-04
Payer: COMMERCIAL

## 2022-02-04 ENCOUNTER — ASOB RESULT (OUTPATIENT)
Age: 42
End: 2022-02-04

## 2022-02-04 PROCEDURE — 76816 OB US FOLLOW-UP PER FETUS: CPT

## 2022-02-18 ENCOUNTER — LABORATORY RESULT (OUTPATIENT)
Age: 42
End: 2022-02-18

## 2022-02-18 ENCOUNTER — APPOINTMENT (OUTPATIENT)
Dept: OBGYN | Facility: CLINIC | Age: 42
End: 2022-02-18
Payer: COMMERCIAL

## 2022-02-18 VITALS
WEIGHT: 278 LBS | SYSTOLIC BLOOD PRESSURE: 126 MMHG | DIASTOLIC BLOOD PRESSURE: 80 MMHG | HEIGHT: 65 IN | BODY MASS INDEX: 46.32 KG/M2

## 2022-02-18 PROCEDURE — 0502F SUBSEQUENT PRENATAL CARE: CPT

## 2022-02-19 LAB
BASOPHILS # BLD AUTO: 0.03 K/UL
BASOPHILS NFR BLD AUTO: 0.4 %
EOSINOPHIL # BLD AUTO: 0.09 K/UL
EOSINOPHIL NFR BLD AUTO: 1.1 %
HCT VFR BLD CALC: 37.6 %
HGB BLD-MCNC: 12.1 G/DL
IMM GRANULOCYTES NFR BLD AUTO: 0.6 %
LYMPHOCYTES # BLD AUTO: 1.62 K/UL
LYMPHOCYTES NFR BLD AUTO: 19.5 %
MAN DIFF?: NORMAL
MCHC RBC-ENTMCNC: 30.9 PG
MCHC RBC-ENTMCNC: 32.2 GM/DL
MCV RBC AUTO: 96.2 FL
MONOCYTES # BLD AUTO: 0.57 K/UL
MONOCYTES NFR BLD AUTO: 6.9 %
NEUTROPHILS # BLD AUTO: 5.94 K/UL
NEUTROPHILS NFR BLD AUTO: 71.5 %
PLATELET # BLD AUTO: 234 K/UL
RBC # BLD: 3.91 M/UL
RBC # FLD: 13.8 %
WBC # FLD AUTO: 8.3 K/UL

## 2022-02-22 LAB — BLD GP AB SCN SERPL QL: NORMAL

## 2022-02-26 NOTE — OB PROVIDER H&P - NS_CTXBYPALP_OBGYN_ALL_OB
CAD/stent education completed with pt and his wife, pt declined phase 2 cardiac rehab.
Left CAD binder in room for pateint.  Placed stent cards in binder
Pt not receptive to CAD/stent education. As soon as I began explaining CR program, pt immediately said he already exercises and repeated this multiple times. Does not wish to do phase 2 CR. Pulled out phone as I was speaking and then pt's wife arrived. I completed education with her.
Mild

## 2022-03-04 ENCOUNTER — APPOINTMENT (OUTPATIENT)
Dept: ANTEPARTUM | Facility: CLINIC | Age: 42
End: 2022-03-04
Payer: COMMERCIAL

## 2022-03-04 ENCOUNTER — ASOB RESULT (OUTPATIENT)
Age: 42
End: 2022-03-04

## 2022-03-04 PROCEDURE — 76816 OB US FOLLOW-UP PER FETUS: CPT

## 2022-03-08 ENCOUNTER — APPOINTMENT (OUTPATIENT)
Dept: OBGYN | Facility: CLINIC | Age: 42
End: 2022-03-08
Payer: COMMERCIAL

## 2022-03-08 ENCOUNTER — MED ADMIN CHARGE (OUTPATIENT)
Age: 42
End: 2022-03-08

## 2022-03-08 VITALS
SYSTOLIC BLOOD PRESSURE: 127 MMHG | BODY MASS INDEX: 47.32 KG/M2 | HEIGHT: 65 IN | DIASTOLIC BLOOD PRESSURE: 84 MMHG | WEIGHT: 284 LBS

## 2022-03-08 PROCEDURE — 90471 IMMUNIZATION ADMIN: CPT

## 2022-03-08 PROCEDURE — 90715 TDAP VACCINE 7 YRS/> IM: CPT

## 2022-03-08 PROCEDURE — 0502F SUBSEQUENT PRENATAL CARE: CPT

## 2022-03-10 ENCOUNTER — NON-APPOINTMENT (OUTPATIENT)
Age: 42
End: 2022-03-10

## 2022-03-23 ENCOUNTER — APPOINTMENT (OUTPATIENT)
Dept: OBGYN | Facility: CLINIC | Age: 42
End: 2022-03-23
Payer: COMMERCIAL

## 2022-03-23 VITALS
SYSTOLIC BLOOD PRESSURE: 120 MMHG | BODY MASS INDEX: 48.65 KG/M2 | DIASTOLIC BLOOD PRESSURE: 80 MMHG | HEIGHT: 65 IN | WEIGHT: 292 LBS

## 2022-03-23 PROCEDURE — 0502F SUBSEQUENT PRENATAL CARE: CPT

## 2022-04-01 ENCOUNTER — APPOINTMENT (OUTPATIENT)
Dept: ANTEPARTUM | Facility: CLINIC | Age: 42
End: 2022-04-01
Payer: COMMERCIAL

## 2022-04-01 ENCOUNTER — ASOB RESULT (OUTPATIENT)
Age: 42
End: 2022-04-01

## 2022-04-01 PROCEDURE — 76819 FETAL BIOPHYS PROFIL W/O NST: CPT

## 2022-04-01 PROCEDURE — ZZZZZ: CPT

## 2022-04-01 PROCEDURE — 76820 UMBILICAL ARTERY ECHO: CPT

## 2022-04-01 PROCEDURE — 76818 FETAL BIOPHYS PROFILE W/NST: CPT

## 2022-04-01 PROCEDURE — 93976 VASCULAR STUDY: CPT

## 2022-04-04 ENCOUNTER — APPOINTMENT (OUTPATIENT)
Dept: ANTEPARTUM | Facility: CLINIC | Age: 42
End: 2022-04-04
Payer: COMMERCIAL

## 2022-04-04 ENCOUNTER — ASOB RESULT (OUTPATIENT)
Age: 42
End: 2022-04-04

## 2022-04-04 PROCEDURE — 76818 FETAL BIOPHYS PROFILE W/NST: CPT

## 2022-04-04 PROCEDURE — ZZZZZ: CPT

## 2022-04-04 PROCEDURE — 76821 MIDDLE CEREBRAL ARTERY ECHO: CPT

## 2022-04-06 ENCOUNTER — APPOINTMENT (OUTPATIENT)
Dept: OBGYN | Facility: CLINIC | Age: 42
End: 2022-04-06
Payer: COMMERCIAL

## 2022-04-06 VITALS
BODY MASS INDEX: 48.82 KG/M2 | DIASTOLIC BLOOD PRESSURE: 83 MMHG | WEIGHT: 293 LBS | HEIGHT: 65 IN | SYSTOLIC BLOOD PRESSURE: 132 MMHG

## 2022-04-06 PROCEDURE — 0502F SUBSEQUENT PRENATAL CARE: CPT

## 2022-04-07 ENCOUNTER — APPOINTMENT (OUTPATIENT)
Dept: ANTEPARTUM | Facility: CLINIC | Age: 42
End: 2022-04-07
Payer: COMMERCIAL

## 2022-04-07 ENCOUNTER — ASOB RESULT (OUTPATIENT)
Age: 42
End: 2022-04-07

## 2022-04-07 PROCEDURE — 76821 MIDDLE CEREBRAL ARTERY ECHO: CPT

## 2022-04-07 PROCEDURE — 76818 FETAL BIOPHYS PROFILE W/NST: CPT

## 2022-04-11 ENCOUNTER — APPOINTMENT (OUTPATIENT)
Dept: ANTEPARTUM | Facility: CLINIC | Age: 42
End: 2022-04-11
Payer: COMMERCIAL

## 2022-04-11 ENCOUNTER — OUTPATIENT (OUTPATIENT)
Dept: INPATIENT UNIT | Facility: HOSPITAL | Age: 42
LOS: 1 days | End: 2022-04-11
Payer: COMMERCIAL

## 2022-04-11 ENCOUNTER — ASOB RESULT (OUTPATIENT)
Age: 42
End: 2022-04-11

## 2022-04-11 VITALS — DIASTOLIC BLOOD PRESSURE: 85 MMHG | SYSTOLIC BLOOD PRESSURE: 145 MMHG | HEART RATE: 92 BPM

## 2022-04-11 VITALS — HEART RATE: 90 BPM | DIASTOLIC BLOOD PRESSURE: 75 MMHG | SYSTOLIC BLOOD PRESSURE: 128 MMHG

## 2022-04-11 DIAGNOSIS — Z98.890 OTHER SPECIFIED POSTPROCEDURAL STATES: Chronic | ICD-10-CM

## 2022-04-11 DIAGNOSIS — O47.03 FALSE LABOR BEFORE 37 COMPLETED WEEKS OF GESTATION, THIRD TRIMESTER: ICD-10-CM

## 2022-04-11 LAB
A1C WITH ESTIMATED AVERAGE GLUCOSE RESULT: 6.7 % — HIGH (ref 4–5.6)
ALBUMIN SERPL ELPH-MCNC: 3.2 G/DL — LOW (ref 3.3–5.2)
ALP SERPL-CCNC: 98 U/L — SIGNIFICANT CHANGE UP (ref 40–120)
ALT FLD-CCNC: 9 U/L — SIGNIFICANT CHANGE UP
ANION GAP SERPL CALC-SCNC: 14 MMOL/L — SIGNIFICANT CHANGE UP (ref 5–17)
APPEARANCE UR: ABNORMAL
APTT BLD: 26.4 SEC — LOW (ref 27.5–35.5)
AST SERPL-CCNC: 13 U/L — SIGNIFICANT CHANGE UP
BACTERIA # UR AUTO: ABNORMAL
BASOPHILS # BLD AUTO: 0.03 K/UL — SIGNIFICANT CHANGE UP (ref 0–0.2)
BASOPHILS NFR BLD AUTO: 0.5 % — SIGNIFICANT CHANGE UP (ref 0–2)
BILIRUB SERPL-MCNC: 0.2 MG/DL — LOW (ref 0.4–2)
BILIRUB UR-MCNC: NEGATIVE — SIGNIFICANT CHANGE UP
BUN SERPL-MCNC: 8.3 MG/DL — SIGNIFICANT CHANGE UP (ref 8–20)
CALCIUM SERPL-MCNC: 8.9 MG/DL — SIGNIFICANT CHANGE UP (ref 8.6–10.2)
CHLORIDE SERPL-SCNC: 103 MMOL/L — SIGNIFICANT CHANGE UP (ref 98–107)
CO2 SERPL-SCNC: 19 MMOL/L — LOW (ref 22–29)
COLOR SPEC: YELLOW — SIGNIFICANT CHANGE UP
CREAT ?TM UR-MCNC: 137 MG/DL — SIGNIFICANT CHANGE UP
CREAT SERPL-MCNC: 0.38 MG/DL — LOW (ref 0.5–1.3)
DIFF PNL FLD: NEGATIVE — SIGNIFICANT CHANGE UP
EGFR: 130 ML/MIN/1.73M2 — SIGNIFICANT CHANGE UP
EOSINOPHIL # BLD AUTO: 0.09 K/UL — SIGNIFICANT CHANGE UP (ref 0–0.5)
EOSINOPHIL NFR BLD AUTO: 1.5 % — SIGNIFICANT CHANGE UP (ref 0–6)
EPI CELLS # UR: ABNORMAL
ESTIMATED AVERAGE GLUCOSE: 146 MG/DL — HIGH (ref 68–114)
FIBRINOGEN PPP-MCNC: 802 MG/DL — CRITICAL HIGH (ref 330–520)
GLUCOSE BLDC GLUCOMTR-MCNC: 125 MG/DL — HIGH (ref 70–99)
GLUCOSE SERPL-MCNC: 125 MG/DL — HIGH (ref 70–99)
GLUCOSE UR QL: NEGATIVE MG/DL — SIGNIFICANT CHANGE UP
HCT VFR BLD CALC: 34.3 % — LOW (ref 34.5–45)
HGB BLD-MCNC: 11.6 G/DL — SIGNIFICANT CHANGE UP (ref 11.5–15.5)
IMM GRANULOCYTES NFR BLD AUTO: 0.5 % — SIGNIFICANT CHANGE UP (ref 0–1.5)
INR BLD: 0.96 RATIO — SIGNIFICANT CHANGE UP (ref 0.88–1.16)
KETONES UR-MCNC: ABNORMAL
LDH SERPL L TO P-CCNC: 145 U/L — SIGNIFICANT CHANGE UP (ref 98–192)
LEUKOCYTE ESTERASE UR-ACNC: ABNORMAL
LYMPHOCYTES # BLD AUTO: 1.3 K/UL — SIGNIFICANT CHANGE UP (ref 1–3.3)
LYMPHOCYTES # BLD AUTO: 21.6 % — SIGNIFICANT CHANGE UP (ref 13–44)
MCHC RBC-ENTMCNC: 30.7 PG — SIGNIFICANT CHANGE UP (ref 27–34)
MCHC RBC-ENTMCNC: 33.8 GM/DL — SIGNIFICANT CHANGE UP (ref 32–36)
MCV RBC AUTO: 90.7 FL — SIGNIFICANT CHANGE UP (ref 80–100)
MONOCYTES # BLD AUTO: 0.42 K/UL — SIGNIFICANT CHANGE UP (ref 0–0.9)
MONOCYTES NFR BLD AUTO: 7 % — SIGNIFICANT CHANGE UP (ref 2–14)
NEUTROPHILS # BLD AUTO: 4.16 K/UL — SIGNIFICANT CHANGE UP (ref 1.8–7.4)
NEUTROPHILS NFR BLD AUTO: 68.9 % — SIGNIFICANT CHANGE UP (ref 43–77)
NITRITE UR-MCNC: NEGATIVE — SIGNIFICANT CHANGE UP
PH UR: 5 — SIGNIFICANT CHANGE UP (ref 5–8)
PLATELET # BLD AUTO: 197 K/UL — SIGNIFICANT CHANGE UP (ref 150–400)
POTASSIUM SERPL-MCNC: 4.2 MMOL/L — SIGNIFICANT CHANGE UP (ref 3.5–5.3)
POTASSIUM SERPL-SCNC: 4.2 MMOL/L — SIGNIFICANT CHANGE UP (ref 3.5–5.3)
PROT ?TM UR-MCNC: 38 MG/DL — HIGH (ref 0–12)
PROT SERPL-MCNC: 6.1 G/DL — LOW (ref 6.6–8.7)
PROT UR-MCNC: 15
PROT/CREAT UR-RTO: 0.3 RATIO — HIGH
PROTHROM AB SERPL-ACNC: 11.1 SEC — SIGNIFICANT CHANGE UP (ref 10.5–13.4)
RBC # BLD: 3.78 M/UL — LOW (ref 3.8–5.2)
RBC # FLD: 13.5 % — SIGNIFICANT CHANGE UP (ref 10.3–14.5)
RBC CASTS # UR COMP ASSIST: SIGNIFICANT CHANGE UP /HPF (ref 0–4)
SODIUM SERPL-SCNC: 136 MMOL/L — SIGNIFICANT CHANGE UP (ref 135–145)
SP GR SPEC: 1.02 — SIGNIFICANT CHANGE UP (ref 1.01–1.02)
URATE SERPL-MCNC: 5.4 MG/DL — SIGNIFICANT CHANGE UP (ref 2.4–5.7)
UROBILINOGEN FLD QL: 1 MG/DL
WBC # BLD: 6.03 K/UL — SIGNIFICANT CHANGE UP (ref 3.8–10.5)
WBC # FLD AUTO: 6.03 K/UL — SIGNIFICANT CHANGE UP (ref 3.8–10.5)
WBC UR QL: SIGNIFICANT CHANGE UP /HPF (ref 0–5)

## 2022-04-11 PROCEDURE — 82962 GLUCOSE BLOOD TEST: CPT

## 2022-04-11 PROCEDURE — 76818 FETAL BIOPHYS PROFILE W/NST: CPT

## 2022-04-11 PROCEDURE — 81001 URINALYSIS AUTO W/SCOPE: CPT

## 2022-04-11 PROCEDURE — 85610 PROTHROMBIN TIME: CPT

## 2022-04-11 PROCEDURE — 80053 COMPREHEN METABOLIC PANEL: CPT

## 2022-04-11 PROCEDURE — 76821 MIDDLE CEREBRAL ARTERY ECHO: CPT

## 2022-04-11 PROCEDURE — 83036 HEMOGLOBIN GLYCOSYLATED A1C: CPT

## 2022-04-11 PROCEDURE — 99214 OFFICE O/P EST MOD 30 MIN: CPT

## 2022-04-11 PROCEDURE — 82570 ASSAY OF URINE CREATININE: CPT

## 2022-04-11 PROCEDURE — 76820 UMBILICAL ARTERY ECHO: CPT

## 2022-04-11 PROCEDURE — 93976 VASCULAR STUDY: CPT

## 2022-04-11 PROCEDURE — 85730 THROMBOPLASTIN TIME PARTIAL: CPT

## 2022-04-11 PROCEDURE — 84156 ASSAY OF PROTEIN URINE: CPT

## 2022-04-11 PROCEDURE — G0463: CPT

## 2022-04-11 PROCEDURE — 83615 LACTATE (LD) (LDH) ENZYME: CPT

## 2022-04-11 PROCEDURE — 85025 COMPLETE CBC W/AUTO DIFF WBC: CPT

## 2022-04-11 PROCEDURE — 59025 FETAL NON-STRESS TEST: CPT

## 2022-04-11 PROCEDURE — ZZZZZ: CPT

## 2022-04-11 PROCEDURE — 36415 COLL VENOUS BLD VENIPUNCTURE: CPT

## 2022-04-11 PROCEDURE — 85384 FIBRINOGEN ACTIVITY: CPT

## 2022-04-11 PROCEDURE — 76815 OB US LIMITED FETUS(S): CPT

## 2022-04-11 PROCEDURE — 84550 ASSAY OF BLOOD/URIC ACID: CPT

## 2022-04-11 RX ORDER — SODIUM CHLORIDE 9 MG/ML
1000 INJECTION, SOLUTION INTRAVENOUS
Refills: 0 | Status: DISCONTINUED | OUTPATIENT
Start: 2022-04-11 | End: 2022-04-25

## 2022-04-11 RX ORDER — METOCLOPRAMIDE HCL 10 MG
10 TABLET ORAL ONCE
Refills: 0 | Status: COMPLETED | OUTPATIENT
Start: 2022-04-11 | End: 2022-04-11

## 2022-04-11 RX ORDER — DIPHENHYDRAMINE HCL 50 MG
25 CAPSULE ORAL ONCE
Refills: 0 | Status: COMPLETED | OUTPATIENT
Start: 2022-04-11 | End: 2022-04-11

## 2022-04-11 RX ORDER — ACETAMINOPHEN 500 MG
1000 TABLET ORAL ONCE
Refills: 0 | Status: COMPLETED | OUTPATIENT
Start: 2022-04-11 | End: 2022-04-11

## 2022-04-11 RX ADMIN — Medication 10 MILLIGRAM(S): at 10:58

## 2022-04-11 RX ADMIN — SODIUM CHLORIDE 125 MILLILITER(S): 9 INJECTION, SOLUTION INTRAVENOUS at 10:49

## 2022-04-11 RX ADMIN — Medication 25 MILLIGRAM(S): at 10:55

## 2022-04-11 RX ADMIN — Medication 400 MILLIGRAM(S): at 11:05

## 2022-04-11 NOTE — OB PROVIDER TRIAGE NOTE - NSHPPHYSICALEXAM_GEN_ALL_CORE
Vital Signs Last 24 Hrs  HR: 92 (11 Apr 2022 10:14) (92 - 92)  BP: 145/85 (11 Apr 2022 10:14) (145/85 - 145/85)    Gen: NAD  Cardio: RRR  Lungs: CTAB   Abdomen: gravid, nontender to palpation  Ext: nontender lower extremities bilaterally     FHT: 120  [place]: minimal activity

## 2022-04-11 NOTE — OB RN TRIAGE NOTE - FALL HARM RISK - UNIVERSAL INTERVENTIONS
Bed in lowest position, wheels locked, appropriate side rails in place/Call bell, personal items and telephone in reach/Instruct patient to call for assistance before getting out of bed or chair/Non-slip footwear when patient is out of bed/[place] to call system/Physically safe environment - no spills, clutter or unnecessary equipment/Purposeful Proactive Rounding/Room/bathroom lighting operational, light cord in reach

## 2022-04-11 NOTE — OB PROVIDER TRIAGE NOTE - HISTORY OF PRESENT ILLNESS
40 y/o  at 33w6d presenting from [facility] office where she reported having elevated BP yesterday at home and BP today in office was elevated as well. She has history of migraines, had a severe headache yesterday, today it was mild in the morning but is now moderate intensity. Usually takes Tylenol for headaches at home but it doesn't help, has tried Fioricet before which caused her nausea/vomiting. Denies visual disturbances, chest pain, SOB, or RUQ pain. Denies any vaginal bleeding, leakage of fluid, and lower abdominal pain/cramping. +FM   LMP 2021  EDC: 22, dated by LMP consistent with 1T sono     Prenatal course significant for:   1. polyhydramnios, OCTAVIO 33.6cm as of   2. LGA, 1623g on 3/4 (98%ile), AC 99%ile+  3. T2DM, on Insulin  4. Rh -, Rhogam on   5. AMA   6. BMI 48.6  7. hx of C/S x1   8. Asthma     ObHx:   G1: 2019, pCS, FT, female infant, failed IOL   GynHx: denies history of abnormal pap smears, fibroids, or ovarian cysts; denies history of STD's   PMH/PSH: GERD, ASTHMA (not on meds); 3 laparoscopic procedures for endometriosis 2 D/C endometriosis R knee sx  C/S 2019, right breast lumpectomy in   Meds: PNV, diclegis, insulin   Allergies: PCN (hives)   SocHx: denies use of EtOH, illicit drugs, or tobacco during this pregnancy or prior; denies any hx of anxiety or depression; feels safe at home

## 2022-04-11 NOTE — OB PROVIDER TRIAGE NOTE - NSOBPROVIDERNOTE_OBGYN_ALL_OB_FT
42 y/o  at 33w6d here for evaluation of elevated blood pressures in setting of headache, r/o pre-eclampsia.   - PIH labs   - BP monitoring   - efm/toco, continuous   - will treat headache     d/w Dr. Albert

## 2022-04-11 NOTE — OB PROVIDER TRIAGE NOTE - ATTENDING COMMENTS
Patient seen and examined at bedside with me.  Headache improved with tylenol.  BP's 120-130/80's.  PEC labs reviewed.  Pr/Cr ration 0.3.  NST reactive.  [place] without contractions.  Case discussed with MFM.  Will DC home with strict call parameters.  Continue twice weekly fetal testing with MFM.

## 2022-04-11 NOTE — OB PROVIDER TRIAGE NOTE - NSICDXPASTSURGICALHX_GEN_ALL_CORE_FT
PAST SURGICAL HISTORY:  H/O exploratory laparotomy     H/O knee surgery 2000    H/O lumpectomy 2003    History of colposcopy 4 times    History of removal of cyst removed 8 cysts from head

## 2022-04-13 ENCOUNTER — NON-APPOINTMENT (OUTPATIENT)
Age: 42
End: 2022-04-13

## 2022-04-14 ENCOUNTER — ASOB RESULT (OUTPATIENT)
Age: 42
End: 2022-04-14

## 2022-04-14 ENCOUNTER — APPOINTMENT (OUTPATIENT)
Dept: ANTEPARTUM | Facility: CLINIC | Age: 42
End: 2022-04-14
Payer: COMMERCIAL

## 2022-04-14 PROCEDURE — 93976 VASCULAR STUDY: CPT

## 2022-04-14 PROCEDURE — 76820 UMBILICAL ARTERY ECHO: CPT

## 2022-04-14 PROCEDURE — 76821 MIDDLE CEREBRAL ARTERY ECHO: CPT

## 2022-04-14 PROCEDURE — 76818 FETAL BIOPHYS PROFILE W/NST: CPT

## 2022-04-14 PROCEDURE — ZZZZZ: CPT

## 2022-04-18 ENCOUNTER — APPOINTMENT (OUTPATIENT)
Dept: ANTEPARTUM | Facility: CLINIC | Age: 42
End: 2022-04-18
Payer: COMMERCIAL

## 2022-04-18 ENCOUNTER — ASOB RESULT (OUTPATIENT)
Age: 42
End: 2022-04-18

## 2022-04-18 PROCEDURE — 76820 UMBILICAL ARTERY ECHO: CPT

## 2022-04-18 PROCEDURE — 93976 VASCULAR STUDY: CPT

## 2022-04-18 PROCEDURE — 76818 FETAL BIOPHYS PROFILE W/NST: CPT

## 2022-04-18 PROCEDURE — ZZZZZ: CPT

## 2022-04-18 PROCEDURE — 76821 MIDDLE CEREBRAL ARTERY ECHO: CPT

## 2022-04-19 ENCOUNTER — TRANSCRIPTION ENCOUNTER (OUTPATIENT)
Age: 42
End: 2022-04-19

## 2022-04-20 ENCOUNTER — APPOINTMENT (OUTPATIENT)
Dept: OBGYN | Facility: CLINIC | Age: 42
End: 2022-04-20
Payer: COMMERCIAL

## 2022-04-20 ENCOUNTER — RESULT REVIEW (OUTPATIENT)
Age: 42
End: 2022-04-20

## 2022-04-20 ENCOUNTER — INPATIENT (INPATIENT)
Facility: HOSPITAL | Age: 42
LOS: 1 days | Discharge: HOME | End: 2022-04-22
Attending: OBSTETRICS & GYNECOLOGY | Admitting: OBSTETRICS & GYNECOLOGY
Payer: COMMERCIAL

## 2022-04-20 VITALS
BODY MASS INDEX: 48.82 KG/M2 | DIASTOLIC BLOOD PRESSURE: 90 MMHG | SYSTOLIC BLOOD PRESSURE: 135 MMHG | WEIGHT: 293 LBS | HEIGHT: 65 IN

## 2022-04-20 VITALS — DIASTOLIC BLOOD PRESSURE: 92 MMHG | SYSTOLIC BLOOD PRESSURE: 163 MMHG | HEART RATE: 108 BPM

## 2022-04-20 DIAGNOSIS — O47.03 FALSE LABOR BEFORE 37 COMPLETED WEEKS OF GESTATION, THIRD TRIMESTER: ICD-10-CM

## 2022-04-20 DIAGNOSIS — Z98.890 OTHER SPECIFIED POSTPROCEDURAL STATES: Chronic | ICD-10-CM

## 2022-04-20 DIAGNOSIS — Z34.91 ENCOUNTER FOR SUPERVISION OF NORMAL PREGNANCY, UNSPECIFIED, FIRST TRIMESTER: ICD-10-CM

## 2022-04-20 DIAGNOSIS — Z98.891 HISTORY OF UTERINE SCAR FROM PREVIOUS SURGERY: Chronic | ICD-10-CM

## 2022-04-20 DIAGNOSIS — O26.893 OTHER SPECIFIED PREGNANCY RELATED CONDITIONS, THIRD TRIMESTER: ICD-10-CM

## 2022-04-20 LAB
ALBUMIN SERPL ELPH-MCNC: 3 G/DL — LOW (ref 3.3–5.2)
ALLERGY+IMMUNOLOGY DIAG STUDY NOTE: SIGNIFICANT CHANGE UP
ALP SERPL-CCNC: 114 U/L — SIGNIFICANT CHANGE UP (ref 40–120)
ALT FLD-CCNC: 9 U/L — SIGNIFICANT CHANGE UP
ANION GAP SERPL CALC-SCNC: 12 MMOL/L — SIGNIFICANT CHANGE UP (ref 5–17)
APPEARANCE UR: CLEAR — SIGNIFICANT CHANGE UP
APTT BLD: 27.4 SEC — LOW (ref 27.5–35.5)
AST SERPL-CCNC: 12 U/L — SIGNIFICANT CHANGE UP
BACTERIA # UR AUTO: ABNORMAL
BASOPHILS # BLD AUTO: 0.01 K/UL — SIGNIFICANT CHANGE UP (ref 0–0.2)
BASOPHILS # BLD AUTO: 0.02 K/UL — SIGNIFICANT CHANGE UP (ref 0–0.2)
BASOPHILS NFR BLD AUTO: 0.1 % — SIGNIFICANT CHANGE UP (ref 0–2)
BASOPHILS NFR BLD AUTO: 0.3 % — SIGNIFICANT CHANGE UP (ref 0–2)
BILIRUB SERPL-MCNC: <0.2 MG/DL — LOW (ref 0.4–2)
BILIRUB UR-MCNC: NEGATIVE — SIGNIFICANT CHANGE UP
BLD GP AB SCN SERPL QL: SIGNIFICANT CHANGE UP
BUN SERPL-MCNC: 11.8 MG/DL — SIGNIFICANT CHANGE UP (ref 8–20)
CALCIUM SERPL-MCNC: 9.1 MG/DL — SIGNIFICANT CHANGE UP (ref 8.6–10.2)
CHLORIDE SERPL-SCNC: 103 MMOL/L — SIGNIFICANT CHANGE UP (ref 98–107)
CO2 SERPL-SCNC: 22 MMOL/L — SIGNIFICANT CHANGE UP (ref 22–29)
COD CRY URNS QL: ABNORMAL
COLOR SPEC: YELLOW — SIGNIFICANT CHANGE UP
COVID-19 SPIKE DOMAIN AB INTERP: POSITIVE
COVID-19 SPIKE DOMAIN ANTIBODY RESULT: >250 U/ML — HIGH
CREAT SERPL-MCNC: 0.48 MG/DL — LOW (ref 0.5–1.3)
DIFF PNL FLD: NEGATIVE — SIGNIFICANT CHANGE UP
DIR ANTIGLOB POLYSPECIFIC INTERPRETATION: SIGNIFICANT CHANGE UP
EGFR: 123 ML/MIN/1.73M2 — SIGNIFICANT CHANGE UP
EOSINOPHIL # BLD AUTO: 0.06 K/UL — SIGNIFICANT CHANGE UP (ref 0–0.5)
EOSINOPHIL # BLD AUTO: 0.06 K/UL — SIGNIFICANT CHANGE UP (ref 0–0.5)
EOSINOPHIL NFR BLD AUTO: 0.9 % — SIGNIFICANT CHANGE UP (ref 0–6)
EOSINOPHIL NFR BLD AUTO: 0.9 % — SIGNIFICANT CHANGE UP (ref 0–6)
EPI CELLS # UR: ABNORMAL
FIBRINOGEN PPP-MCNC: 796 MG/DL — CRITICAL HIGH (ref 330–520)
GLUCOSE BLDC GLUCOMTR-MCNC: 113 MG/DL — HIGH (ref 70–99)
GLUCOSE BLDC GLUCOMTR-MCNC: 91 MG/DL — SIGNIFICANT CHANGE UP (ref 70–99)
GLUCOSE SERPL-MCNC: 126 MG/DL — HIGH (ref 70–99)
GLUCOSE UR QL: 50 MG/DL
HCT VFR BLD CALC: 34.1 % — LOW (ref 34.5–45)
HCT VFR BLD CALC: 34.3 % — LOW (ref 34.5–45)
HGB BLD-MCNC: 11.5 G/DL — SIGNIFICANT CHANGE UP (ref 11.5–15.5)
HGB BLD-MCNC: 11.6 G/DL — SIGNIFICANT CHANGE UP (ref 11.5–15.5)
HIV 1 & 2 AB SERPL IA.RAPID: SIGNIFICANT CHANGE UP
IMM GRANULOCYTES NFR BLD AUTO: 0.6 % — SIGNIFICANT CHANGE UP (ref 0–1.5)
IMM GRANULOCYTES NFR BLD AUTO: 0.7 % — SIGNIFICANT CHANGE UP (ref 0–1.5)
INR BLD: 0.97 RATIO — SIGNIFICANT CHANGE UP (ref 0.88–1.16)
KETONES UR-MCNC: ABNORMAL
LDH SERPL L TO P-CCNC: 184 U/L — SIGNIFICANT CHANGE UP (ref 98–192)
LEUKOCYTE ESTERASE UR-ACNC: ABNORMAL
LYMPHOCYTES # BLD AUTO: 1.18 K/UL — SIGNIFICANT CHANGE UP (ref 1–3.3)
LYMPHOCYTES # BLD AUTO: 1.18 K/UL — SIGNIFICANT CHANGE UP (ref 1–3.3)
LYMPHOCYTES # BLD AUTO: 17.3 % — SIGNIFICANT CHANGE UP (ref 13–44)
LYMPHOCYTES # BLD AUTO: 17.4 % — SIGNIFICANT CHANGE UP (ref 13–44)
MCHC RBC-ENTMCNC: 30.9 PG — SIGNIFICANT CHANGE UP (ref 27–34)
MCHC RBC-ENTMCNC: 31.2 PG — SIGNIFICANT CHANGE UP (ref 27–34)
MCHC RBC-ENTMCNC: 33.7 GM/DL — SIGNIFICANT CHANGE UP (ref 32–36)
MCHC RBC-ENTMCNC: 33.8 GM/DL — SIGNIFICANT CHANGE UP (ref 32–36)
MCV RBC AUTO: 91.7 FL — SIGNIFICANT CHANGE UP (ref 80–100)
MCV RBC AUTO: 92.2 FL — SIGNIFICANT CHANGE UP (ref 80–100)
MONOCYTES # BLD AUTO: 0.45 K/UL — SIGNIFICANT CHANGE UP (ref 0–0.9)
MONOCYTES # BLD AUTO: 0.47 K/UL — SIGNIFICANT CHANGE UP (ref 0–0.9)
MONOCYTES NFR BLD AUTO: 6.6 % — SIGNIFICANT CHANGE UP (ref 2–14)
MONOCYTES NFR BLD AUTO: 6.9 % — SIGNIFICANT CHANGE UP (ref 2–14)
NEUTROPHILS # BLD AUTO: 5.04 K/UL — SIGNIFICANT CHANGE UP (ref 1.8–7.4)
NEUTROPHILS # BLD AUTO: 5.05 K/UL — SIGNIFICANT CHANGE UP (ref 1.8–7.4)
NEUTROPHILS NFR BLD AUTO: 73.9 % — SIGNIFICANT CHANGE UP (ref 43–77)
NEUTROPHILS NFR BLD AUTO: 74.4 % — SIGNIFICANT CHANGE UP (ref 43–77)
NITRITE UR-MCNC: NEGATIVE — SIGNIFICANT CHANGE UP
PH UR: 6.5 — SIGNIFICANT CHANGE UP (ref 5–8)
PLATELET # BLD AUTO: 223 K/UL — SIGNIFICANT CHANGE UP (ref 150–400)
PLATELET # BLD AUTO: 226 K/UL — SIGNIFICANT CHANGE UP (ref 150–400)
POTASSIUM SERPL-MCNC: 3.9 MMOL/L — SIGNIFICANT CHANGE UP (ref 3.5–5.3)
POTASSIUM SERPL-SCNC: 3.9 MMOL/L — SIGNIFICANT CHANGE UP (ref 3.5–5.3)
PROT SERPL-MCNC: 6.1 G/DL — LOW (ref 6.6–8.7)
PROT UR-MCNC: 15
PROTHROM AB SERPL-ACNC: 11.2 SEC — SIGNIFICANT CHANGE UP (ref 10.5–13.4)
RBC # BLD: 3.72 M/UL — LOW (ref 3.8–5.2)
RBC # BLD: 3.72 M/UL — LOW (ref 3.8–5.2)
RBC # FLD: 13.5 % — SIGNIFICANT CHANGE UP (ref 10.3–14.5)
RBC # FLD: 13.5 % — SIGNIFICANT CHANGE UP (ref 10.3–14.5)
RBC CASTS # UR COMP ASSIST: SIGNIFICANT CHANGE UP /HPF (ref 0–4)
SARS-COV-2 IGG+IGM SERPL QL IA: >250 U/ML — HIGH
SARS-COV-2 IGG+IGM SERPL QL IA: POSITIVE
SARS-COV-2 RNA SPEC QL NAA+PROBE: SIGNIFICANT CHANGE UP
SODIUM SERPL-SCNC: 137 MMOL/L — SIGNIFICANT CHANGE UP (ref 135–145)
SP GR SPEC: 1.02 — SIGNIFICANT CHANGE UP (ref 1.01–1.02)
URATE SERPL-MCNC: 5.3 MG/DL — SIGNIFICANT CHANGE UP (ref 2.4–5.7)
UROBILINOGEN FLD QL: 1 MG/DL
WBC # BLD: 6.78 K/UL — SIGNIFICANT CHANGE UP (ref 3.8–10.5)
WBC # BLD: 6.83 K/UL — SIGNIFICANT CHANGE UP (ref 3.8–10.5)
WBC # FLD AUTO: 6.78 K/UL — SIGNIFICANT CHANGE UP (ref 3.8–10.5)
WBC # FLD AUTO: 6.83 K/UL — SIGNIFICANT CHANGE UP (ref 3.8–10.5)
WBC UR QL: SIGNIFICANT CHANGE UP /HPF (ref 0–5)

## 2022-04-20 PROCEDURE — 86077 PHYS BLOOD BANK SERV XMATCH: CPT

## 2022-04-20 PROCEDURE — 59510 CESAREAN DELIVERY: CPT

## 2022-04-20 PROCEDURE — 0502F SUBSEQUENT PRENATAL CARE: CPT

## 2022-04-20 PROCEDURE — 59514 CESAREAN DELIVERY ONLY: CPT | Mod: 80

## 2022-04-20 PROCEDURE — 88307 TISSUE EXAM BY PATHOLOGIST: CPT | Mod: 26

## 2022-04-20 PROCEDURE — 58700 REMOVAL OF FALLOPIAN TUBE: CPT

## 2022-04-20 PROCEDURE — 88302 TISSUE EXAM BY PATHOLOGIST: CPT | Mod: 26

## 2022-04-20 PROCEDURE — 99221 1ST HOSP IP/OBS SF/LOW 40: CPT

## 2022-04-20 RX ORDER — IBUPROFEN 200 MG
600 TABLET ORAL EVERY 6 HOURS
Refills: 0 | Status: COMPLETED | OUTPATIENT
Start: 2022-04-20 | End: 2023-03-19

## 2022-04-20 RX ORDER — DIPHENHYDRAMINE HCL 50 MG
25 CAPSULE ORAL EVERY 4 HOURS
Refills: 0 | Status: DISCONTINUED | OUTPATIENT
Start: 2022-04-20 | End: 2022-04-22

## 2022-04-20 RX ORDER — SODIUM CHLORIDE 9 MG/ML
1000 INJECTION, SOLUTION INTRAVENOUS ONCE
Refills: 0 | Status: DISCONTINUED | OUTPATIENT
Start: 2022-04-20 | End: 2022-04-21

## 2022-04-20 RX ORDER — DEXTROSE 50 % IN WATER 50 %
25 SYRINGE (ML) INTRAVENOUS ONCE
Refills: 0 | Status: DISCONTINUED | OUTPATIENT
Start: 2022-04-20 | End: 2022-04-22

## 2022-04-20 RX ORDER — ACETAMINOPHEN 500 MG
1000 TABLET ORAL ONCE
Refills: 0 | Status: DISCONTINUED | OUTPATIENT
Start: 2022-04-20 | End: 2022-04-22

## 2022-04-20 RX ORDER — TETANUS TOXOID, REDUCED DIPHTHERIA TOXOID AND ACELLULAR PERTUSSIS VACCINE, ADSORBED 5; 2.5; 8; 8; 2.5 [IU]/.5ML; [IU]/.5ML; UG/.5ML; UG/.5ML; UG/.5ML
0.5 SUSPENSION INTRAMUSCULAR ONCE
Refills: 0 | Status: DISCONTINUED | OUTPATIENT
Start: 2022-04-20 | End: 2022-04-22

## 2022-04-20 RX ORDER — CITRIC ACID/SODIUM CITRATE 300-500 MG
30 SOLUTION, ORAL ORAL ONCE
Refills: 0 | Status: COMPLETED | OUTPATIENT
Start: 2022-04-20 | End: 2022-04-20

## 2022-04-20 RX ORDER — INSULIN LISPRO 100/ML
VIAL (ML) SUBCUTANEOUS AT BEDTIME
Refills: 0 | Status: DISCONTINUED | OUTPATIENT
Start: 2022-04-20 | End: 2022-04-22

## 2022-04-20 RX ORDER — ACETAMINOPHEN 500 MG
975 TABLET ORAL
Refills: 0 | Status: DISCONTINUED | OUTPATIENT
Start: 2022-04-20 | End: 2022-04-22

## 2022-04-20 RX ORDER — NALBUPHINE HYDROCHLORIDE 10 MG/ML
2.5 INJECTION, SOLUTION INTRAMUSCULAR; INTRAVENOUS; SUBCUTANEOUS EVERY 6 HOURS
Refills: 0 | Status: DISCONTINUED | OUTPATIENT
Start: 2022-04-20 | End: 2022-04-22

## 2022-04-20 RX ORDER — DEXTROSE 50 % IN WATER 50 %
12.5 SYRINGE (ML) INTRAVENOUS ONCE
Refills: 0 | Status: DISCONTINUED | OUTPATIENT
Start: 2022-04-20 | End: 2022-04-22

## 2022-04-20 RX ORDER — MAGNESIUM SULFATE 500 MG/ML
2 VIAL (ML) INJECTION
Qty: 40 | Refills: 0 | Status: DISCONTINUED | OUTPATIENT
Start: 2022-04-20 | End: 2022-04-20

## 2022-04-20 RX ORDER — SODIUM CHLORIDE 9 MG/ML
1000 INJECTION, SOLUTION INTRAVENOUS
Refills: 0 | Status: DISCONTINUED | OUTPATIENT
Start: 2022-04-20 | End: 2022-04-22

## 2022-04-20 RX ORDER — INSULIN LISPRO 100/ML
VIAL (ML) SUBCUTANEOUS
Refills: 0 | Status: DISCONTINUED | OUTPATIENT
Start: 2022-04-20 | End: 2022-04-22

## 2022-04-20 RX ORDER — OXYCODONE HYDROCHLORIDE 5 MG/1
5 TABLET ORAL
Refills: 0 | Status: DISCONTINUED | OUTPATIENT
Start: 2022-04-20 | End: 2022-04-22

## 2022-04-20 RX ORDER — ENOXAPARIN SODIUM 100 MG/ML
60 INJECTION SUBCUTANEOUS EVERY 24 HOURS
Refills: 0 | Status: DISCONTINUED | OUTPATIENT
Start: 2022-04-21 | End: 2022-04-22

## 2022-04-20 RX ORDER — NALOXONE HYDROCHLORIDE 4 MG/.1ML
0.1 SPRAY NASAL
Refills: 0 | Status: DISCONTINUED | OUTPATIENT
Start: 2022-04-20 | End: 2022-04-22

## 2022-04-20 RX ORDER — OXYCODONE HYDROCHLORIDE 5 MG/1
5 TABLET ORAL ONCE
Refills: 0 | Status: DISCONTINUED | OUTPATIENT
Start: 2022-04-20 | End: 2022-04-22

## 2022-04-20 RX ORDER — ONDANSETRON 8 MG/1
4 TABLET, FILM COATED ORAL EVERY 6 HOURS
Refills: 0 | Status: DISCONTINUED | OUTPATIENT
Start: 2022-04-20 | End: 2022-04-22

## 2022-04-20 RX ORDER — DIPHENHYDRAMINE HCL 50 MG
25 CAPSULE ORAL EVERY 6 HOURS
Refills: 0 | Status: DISCONTINUED | OUTPATIENT
Start: 2022-04-20 | End: 2022-04-22

## 2022-04-20 RX ORDER — SIMETHICONE 80 MG/1
80 TABLET, CHEWABLE ORAL EVERY 4 HOURS
Refills: 0 | Status: DISCONTINUED | OUTPATIENT
Start: 2022-04-20 | End: 2022-04-22

## 2022-04-20 RX ORDER — DEXTROSE 50 % IN WATER 50 %
15 SYRINGE (ML) INTRAVENOUS ONCE
Refills: 0 | Status: DISCONTINUED | OUTPATIENT
Start: 2022-04-20 | End: 2022-04-22

## 2022-04-20 RX ORDER — MAGNESIUM SULFATE 500 MG/ML
4 VIAL (ML) INJECTION ONCE
Refills: 0 | Status: COMPLETED | OUTPATIENT
Start: 2022-04-20 | End: 2022-04-20

## 2022-04-20 RX ORDER — KETOROLAC TROMETHAMINE 30 MG/ML
30 SYRINGE (ML) INJECTION EVERY 6 HOURS
Refills: 0 | Status: DISCONTINUED | OUTPATIENT
Start: 2022-04-20 | End: 2022-04-21

## 2022-04-20 RX ORDER — OXYTOCIN 10 UNIT/ML
333.33 VIAL (ML) INJECTION
Qty: 20 | Refills: 0 | Status: COMPLETED | OUTPATIENT
Start: 2022-04-20 | End: 2022-04-20

## 2022-04-20 RX ORDER — GLUCAGON INJECTION, SOLUTION 0.5 MG/.1ML
1 INJECTION, SOLUTION SUBCUTANEOUS ONCE
Refills: 0 | Status: DISCONTINUED | OUTPATIENT
Start: 2022-04-20 | End: 2022-04-22

## 2022-04-20 RX ORDER — SODIUM CHLORIDE 9 MG/ML
1000 INJECTION, SOLUTION INTRAVENOUS
Refills: 0 | Status: DISCONTINUED | OUTPATIENT
Start: 2022-04-20 | End: 2022-04-21

## 2022-04-20 RX ORDER — LANOLIN
1 OINTMENT (GRAM) TOPICAL EVERY 6 HOURS
Refills: 0 | Status: DISCONTINUED | OUTPATIENT
Start: 2022-04-20 | End: 2022-04-22

## 2022-04-20 RX ORDER — MAGNESIUM SULFATE 500 MG/ML
4 VIAL (ML) INJECTION ONCE
Refills: 0 | Status: DISCONTINUED | OUTPATIENT
Start: 2022-04-20 | End: 2022-04-20

## 2022-04-20 RX ORDER — OXYTOCIN 10 UNIT/ML
333.33 VIAL (ML) INJECTION
Qty: 20 | Refills: 0 | Status: DISCONTINUED | OUTPATIENT
Start: 2022-04-20 | End: 2022-04-22

## 2022-04-20 RX ORDER — MAGNESIUM HYDROXIDE 400 MG/1
30 TABLET, CHEWABLE ORAL
Refills: 0 | Status: DISCONTINUED | OUTPATIENT
Start: 2022-04-20 | End: 2022-04-22

## 2022-04-20 RX ORDER — FAMOTIDINE 10 MG/ML
20 INJECTION INTRAVENOUS ONCE
Refills: 0 | Status: COMPLETED | OUTPATIENT
Start: 2022-04-20 | End: 2022-04-20

## 2022-04-20 RX ORDER — GENTAMICIN SULFATE 40 MG/ML
460 VIAL (ML) INJECTION ONCE
Refills: 0 | Status: COMPLETED | OUTPATIENT
Start: 2022-04-20 | End: 2022-04-20

## 2022-04-20 RX ORDER — MAGNESIUM SULFATE 500 MG/ML
2 VIAL (ML) INJECTION
Qty: 40 | Refills: 0 | Status: DISCONTINUED | OUTPATIENT
Start: 2022-04-20 | End: 2022-04-21

## 2022-04-20 RX ADMIN — Medication 300 GRAM(S): at 17:15

## 2022-04-20 RX ADMIN — Medication 1000 MILLIUNIT(S)/MIN: at 20:41

## 2022-04-20 RX ADMIN — Medication 100 MILLIGRAM(S): at 20:10

## 2022-04-20 RX ADMIN — Medication 30 MILLILITER(S): at 19:42

## 2022-04-20 RX ADMIN — Medication 50 GM/HR: at 17:35

## 2022-04-20 RX ADMIN — SODIUM CHLORIDE 125 MILLILITER(S): 9 INJECTION, SOLUTION INTRAVENOUS at 17:15

## 2022-04-20 RX ADMIN — SODIUM CHLORIDE 75 MILLILITER(S): 9 INJECTION, SOLUTION INTRAVENOUS at 17:15

## 2022-04-20 RX ADMIN — Medication 500 MILLIGRAM(S): at 20:15

## 2022-04-20 RX ADMIN — FAMOTIDINE 20 MILLIGRAM(S): 10 INJECTION INTRAVENOUS at 19:43

## 2022-04-20 RX ADMIN — Medication 1000 MILLIUNIT(S)/MIN: at 22:45

## 2022-04-20 NOTE — CONSULT NOTE ADULT - PROBLEM SELECTOR RECOMMENDATION 9
Poorly controlled diabetes associated with polyhydramnios and large size fetus. No wheezing. No wheezing. No labetalol Rx recommended

## 2022-04-20 NOTE — CONSULT NOTE ADULT - ASSESSMENT
A/P: 40 yo  at 35w1d GA who is being admitted for a rCS in the setting of preeclampsia with severe features, poorly controlled GDMA2 and LGA.   -Admit to L&D  -Consent  -Admission labs  -NPO  -IV fluids  -Fetus: Cat I tracing. Continuous toco and fetal monitoring.   -Gentamicin and clindamycin ordered   -DVT ppx: Ambulate and SCD's while in bed   -Late  ACS not recommended in the setting of poorly controlled GDMA2.     Discussed with Dr. Card and Dr. Munoz.    A/P: 42 yo  at 35w1d GA who is being admitted for a rCS in the setting of preeclampsia with severe features, poorly controlled GDMA2, polyhydramnios, and LGA fetus.   -Admit to L&D  -Consent  -Admission labs  -NPO  -IV fluids  -Fetus: Cat I tracing. Continuous toco and fetal monitoring.   -Gentamicin and clindamycin ordered   -DVT ppx: Ambulate and SCD's while in bed   -Late  ACS not recommended in the setting of poorly controlled GDMA2.     Discussed with Dr. Card and Dr. Munoz.    A/P: 40 yo  at 35w1d GA who is being admitted for a rCS in the setting of preeclampsia with severe features, and poorly controlled GDMA2 with polyhydramnios and LGA fetus.  She has advanced maternal age and morbid obesity.  -Admit to L&D  -Consent  -Admission labs  -NPO  -IV fluids  -Fetus: Cat I tracing. Continuous toco and fetal monitoring.   -Gentamicin and clindamycin ordered   -DVT ppx: Ambulate and SCD's while in bed   -Late  ACS not recommended in the setting of poorly controlled GDMA2.     Discussed with Dr. Card and Dr. Munoz.

## 2022-04-20 NOTE — OB RN DELIVERY SUMMARY - NS_GENERALBABYACOMMENTA_OBGYN_ALL_OB_FT
Pt desires to give baby colostrum. Unable to express colostrum in OR. Pt's wishes made known to PACU CRISTOFER Rebolledo RN.

## 2022-04-20 NOTE — OB RN TRIAGE NOTE - NSICDXPASTSURGICALHX_GEN_ALL_CORE_FT
PAST SURGICAL HISTORY:  H/O  section x 1 (2019)    H/O exploratory laparotomy     H/O knee surgery     H/O lumpectomy 2003    History of colposcopy 4 times    History of removal of cyst removed 8 cysts from head

## 2022-04-20 NOTE — OB RN DELIVERY SUMMARY - NSSELHIDDEN_OBGYN_ALL_OB_FT
[NS_DeliveryAttending1_OBGYN_ALL_OB_FT:[id number]=],[NS_DeliveryAttending2_OBGYN_ALL_OB_FT:[id number]],[NS_DeliveryAssist1_OBGYN_ALL_OB_FT:[id number]],[NS_DeliveryRN_OBGYN_ALL_OB_FT:[id number]]

## 2022-04-20 NOTE — OB PROVIDER H&P - ASSESSMENT
A/P: 40 yo  at 35w1d GA who is being admitted for a rCS in the setting of preeclampsia with severe features, poorly controlled GDMA2 and LGA.   -Admit to L&D  -Consent  -Admission labs  -NPO  -IV fluids  -Fetus: Cat I tracing. Continuous toco and fetal monitoring.   -Gentamicin and clindamycin ordered   -DVT ppx: Ambulate and SCD's while in bed     Discussed with Dr. Mckay.  A/P: 40 yo  at 35w1d GA who is being admitted for a rCS in the setting of preeclampsia with severe features, poorly controlled GDMA2 and LGA.     -Admit to L&D  -Consent  -Admission labs  -NPO  -IV fluids  -Fetus: Cat I tracing. Continuous toco and fetal monitoring.   -Gentamicin and clindamycin ordered   -DVT ppx: Ambulate and SCD's while in bed     Discussed with Dr. Mckay.    Addendum:    Risks, benefits, and alternatives of repeat  section with bilateral salpingectomy for permanent sterilization discussed length, including risk of injury to adjacent organs, such as the bladder, bowel, and bilateral ureters, risk of infection, and risk of hemorrhage, which may lead to subsequent intervention and possible blood transfusion.  She has no [place] or personal objection to blood transfusion, if necessary. She understands a bilateral salpingectomy is permanent and if she wishes to become pregnant again, she will need a reproductive specialist to do so.  She was given the opportunity to ask questions and all were addressed.  She understands the plan of care.    John Mckay, DO

## 2022-04-20 NOTE — CONSULT NOTE ADULT - PROBLEM SELECTOR RECOMMENDATION 2
She has severe range BP readings. Patient informed that the treatment for preeclampsia with severe features at 35 weeks gestation is delivery.

## 2022-04-20 NOTE — OB RN TRIAGE NOTE - FALL HARM RISK - UNIVERSAL INTERVENTIONS
Bed in lowest position, wheels locked, appropriate side rails in place/Call bell, personal items and telephone in reach/Instruct patient to call for assistance before getting out of bed or chair/Non-slip footwear when patient is out of bed/[place] to call system/Physically safe environment - no spills, clutter or unnecessary equipment/Purposeful Proactive Rounding/Room/bathroom lighting operational, light cord in reach

## 2022-04-20 NOTE — OB RN INTRAOPERATIVE NOTE - NSSELHIDDEN_OBGYN_ALL_OB_FT
[NS_DeliveryAttending1_OBGYN_ALL_OB_FT:[id number]],[NS_DeliveryAttending2_OBGYN_ALL_OB_FT:[id number]],[NS_DeliveryAssist1_OBGYN_ALL_OB_FT:[id number]],[NS_DeliveryRN_OBGYN_ALL_OB_FT:[id number]]

## 2022-04-20 NOTE — CONSULT NOTE ADULT - ATTENDING COMMENTS
Preeclampsia with severe features, poorly controlled type 2 diabetes, and other comorbidities during pregnancy. A repeat C/S delivery is recommended.

## 2022-04-20 NOTE — OB PROVIDER H&P - NSOBPROCDETAILS_OBGYN_ALL_OB
Dapsone Pregnancy And Lactation Text: This medication is Pregnancy Category C and is not considered safe during pregnancy or breast feeding. Genetic Testing

## 2022-04-20 NOTE — OB PROVIDER DELIVERY SUMMARY - NSPROVIDERDELIVERYNOTE_OBGYN_ALL_OB_FT
Pre-operative Diagnosis: IUP @ 35w1d, preeclampsia with severe features, repeat  section, satisfied parity  Post-operative Diagnosis: IUP @ 35w1d, preeclampsia with severe features, repeat  section, satisfied parity  Findings: Male infant, Weight 3860g, APGAR 9/9  EBL: 536  Anesthesia: Spinal        Patient was taken to the OR, a repeat low transverse  section was performed and a viable male infant  was delivered atraumatically in cephalic presentation, no nuchal cord noted. Uterus closed in one layer using monocryl suture. Left and right fallopian tubes excised using ligasure device. Peritoneum and fascia reapproximated with vicryl suture. Skin closed w/ insorb staples Pre-operative Diagnosis: IUP @ 35w1d, preeclampsia with severe features, repeat  section, satisfied parity  Post-operative Diagnosis: IUP @ 35w1d, preeclampsia with severe features, repeat  section, satisfied parity  Findings: Male infant, Weight 3860g, APGAR 9/  EBL: 536  Anesthesia: Spinal        Patient was taken to the OR, a repeat low transverse  section was performed and a viable male infant  was delivered atraumatically in cephalic presentation, no nuchal cord noted. Uterus closed in one layer using monocryl suture. Left and right fallopian tubes excised using ligasure device. Peritoneum reapproximated with vicryl suture. Fascia reapproximated in a running fashion with PDS.  Skin closed w/ insorb staples

## 2022-04-20 NOTE — CONSULT NOTE ADULT - PROBLEM SELECTOR RECOMMENDATION 8
Secondary to poorly controlled diabetes. Increases risk of stillbirth, ROM, cord prolapse, and abruptio placenta. Secondary to poorly controlled diabetes. Increases risk of stillbirth, ROM, cord prolapse, and abruptio placenta

## 2022-04-20 NOTE — OB RN DELIVERY SUMMARY - NS_SEPSISRSKCALC_OBGYN_ALL_OB_FT
EOS calculated successfully. EOS Risk Factor: 0.5/1000 live births ([facility] national incidence); GA=35w1d; Temp=98.6; ROM=0.1; GBS='Unknown'; Antibiotics='No antibiotics or any antibiotics < 2 hrs prior to birth'

## 2022-04-20 NOTE — OB RN TRIAGE NOTE - TEMPERATURE IN FAHRENHEIT (DEGREES F)
1.  Refractive error2. Astigmatism Annual Good ocular health documented. Discussed options of glasses contacts or refractive surgery. Discussed importance of annual eye exams.  2.  RTC for trial CL fit
RTC for trial CL fit
98.6

## 2022-04-20 NOTE — OB PROVIDER H&P - NSPPHRISKEVAL_OBGYN_ALL_OB
BMI >40/Over distended uterus (polyhydramnios, macrosomia, multiple gestation)/Large for gestational age

## 2022-04-20 NOTE — OB PROVIDER H&P - NSHPLABSRESULTS_GEN_ALL_CORE
11.5   6.83  )-----------( 223      ( 20 Apr 2022 15:49 )             34.1     04-20    137  |  103  |  11.8  ----------------------------<  126<H>  3.9   |  22.0  |  0.48<L>    Ca    9.1      20 Apr 2022 15:49    TPro  6.1<L>  /  Alb  3.0<L>  /  TBili  <0.2<L>  /  DBili  x   /  AST  12  /  ALT  9   /  AlkPhos  114  04-20

## 2022-04-20 NOTE — OB PROVIDER DELIVERY SUMMARY - NSSELHIDDEN_OBGYN_ALL_OB_FT
[NS_DeliveryAttending1_OBGYN_ALL_OB_FT:[id number]=],[NS_DeliveryAttending2_OBGYN_ALL_OB_FT:[id number]],[NS_DeliveryAssist1_OBGYN_ALL_OB_FT:[id number]],[NS_DeliveryRN_OBGYN_ALL_OB_FT:[id number]]

## 2022-04-20 NOTE — OB PROVIDER H&P - HISTORY OF PRESENT ILLNESS
42 yo  at 35w1d GA who was sent from the office for elevated BPs. Denies headaches, visual disturbances, RUQ pain, epigastric pain and new-onset edema. Patient denies vaginal bleeding, contractions and leakage of fluid. She endorses good fetal movement. Denies fevers, chills, nausea and vomiting. No other complaints at this time.   LMP 2021  EDC: 22    Prenatal course significant for:   1. Polyhydramnios, OCTAVIO 33.6cm as of   2. LGA, 1623g on 3/4 (98%ile), AC 99%ile+  3. T2DM, on Insulin  4. Rh negative, Rhogam on   5. AMA   6. BMI 48.6  7. Hx of C/S x1   8. Asthma     ObHx:   G1: 2019, pCS, FT, female infant, failed IOL   PGYN: -fibroids, -cysts, denies STD hx, denies abnormal PAP smears   PMH/PSH: GERD, ASTHMA (not on meds); 3 laparoscopic procedures for endometriosis 2 D/C endometriosis R knee sx  C/S 2019, right breast lumpectomy in   Meds: PNV, diclegis, insulin   Allergies: PCN (hives)   SocHx: denies use of EtOH, illicit drugs, or tobacco during this pregnancy or prior; denies any hx of anxiety or depression; feels safe at home    40 yo  at 35w1d GA who was sent from the office for elevated BPs. Denies headaches, visual disturbances, RUQ pain, epigastric pain and new-onset edema. Patient denies vaginal bleeding, contractions and leakage of fluid. She endorses good fetal movement. Denies fevers, chills, nausea and vomiting. No other complaints at this time.   LMP 2021  EDC: 22    Prenatal course significant for:   1. Polyhydramnios, OCTAVIO 33.6cm as of   2. LGA, 1623g on 3/4 (98%ile), AC 99%ile+  3. GDMA2, on Insulin  4. Rh negative, Rhogam on   5. AMA   6. BMI 48.6  7. Hx of C/S x1   8. Asthma     ObHx:   G1: 2019, pCS, FT, female infant, failed IOL   PGYN: -fibroids, -cysts, denies STD hx, denies abnormal PAP smears   PMH/PSH: GERD, ASTHMA (not on meds); 3 laparoscopic procedures for endometriosis 2 D/C endometriosis R knee sx  C/S 2019, right breast lumpectomy in   Meds: PNV, diclegis, insulin   Allergies: PCN (hives)   SocHx: denies use of EtOH, illicit drugs, or tobacco during this pregnancy or prior; denies any hx of anxiety or depression; feels safe at home    40 yo  at 35w1d GA who was sent from the office for elevated BPs. Denies headaches, visual disturbances, RUQ pain, epigastric pain and new-onset edema. Patient denies vaginal bleeding, contractions and leakage of fluid. She endorses good fetal movement. Denies fevers, chills, nausea and vomiting. No other complaints at this time.   LMP 2021  EDC: 22    Prenatal course significant for:   1. Polyhydramnios, OCTAVIO 33.6cm as of   2. LGA, 1623g on 3/4 (98%ile), AC 99%ile+  3. T2DM, on Insulin  4. Rh negative, Rhogam on   5. AMA   6. BMI 48.6  7. Hx of C/S x1   8. Asthma     ObHx:   G1: 2019, pCS, FT, female infant, failed IOL   PGYN: -fibroids, -cysts, denies STD hx, denies abnormal PAP smears   PMH/PSH: GERD, ASTHMA (not on meds), T2DM; 3 laparoscopic procedures for endometriosis 2 D/C endometriosis R knee sx  C/S 2019, right breast lumpectomy in   Meds: PNV, diclegis, insulin   Allergies: PCN (hives)   SocHx: denies use of EtOH, illicit drugs, or tobacco during this pregnancy or prior; denies any hx of anxiety or depression; feels safe at home

## 2022-04-20 NOTE — OB RN TRIAGE NOTE - CHIEF COMPLAINT QUOTE
"My blood pressure was high in the doctor's office today and I also have not been feeling my baby move the same as before"

## 2022-04-20 NOTE — OB PROVIDER H&P - NSHPPHYSICALEXAM_GEN_ALL_CORE
Vital Signs Last 24 Hrs  T(C): 37 (20 Apr 2022 14:37), Max: 37 (20 Apr 2022 14:37)  T(F): 98.6 (20 Apr 2022 14:37), Max: 98.6 (20 Apr 2022 14:37)  HR: 100 (20 Apr 2022 17:12) (89 - 115)  BP: 145/87 (20 Apr 2022 17:12) (128/75 - 163/92)  RR: 18 (20 Apr 2022 14:37) (18 - 18)    General: NAD, well-appearing  Heart: RRR  Lungs: CTAB  Abdominal: soft, gravid  Ext: non-tender, non-edematous   FHT: baseline 130s, moderate variability, +accels, -decels   [place]: uterine irritability

## 2022-04-20 NOTE — CONSULT NOTE ADULT - SUBJECTIVE AND OBJECTIVE BOX
40 yo  at 35w1d GA who was sent from the office for elevated BPs. Denies headaches, visual disturbances, RUQ pain, epigastric pain and new-onset edema. Patient denies vaginal bleeding, contractions and leakage of fluid. She endorses good fetal movement. Denies fevers, chills, nausea and vomiting. No other complaints at this time.   LMP 2021  EDC: 22    Prenatal course significant for:   1. Polyhydramnios, OCTAVIO 33.6cm as of   2. LGA, 1623g on 3/4 (98%ile), AC 99%ile+  3. GDMA2, on Insulin  4. Rh negative, Rhogam on   5. AMA   6. BMI 48.6  7. Hx of C/S x1   8. Asthma     ObHx:   G1: 2019, pCS, FT, female infant, failed IOL   PGYN: -fibroids, -cysts, denies STD hx, denies abnormal PAP smears   PMH/PSH: GERD, ASTHMA (not on meds); 3 laparoscopic procedures for endometriosis 2 D/C endometriosis R knee sx  C/S , right breast lumpectomy in   Meds: PNV, diclegis, insulin   Allergies: PCN (hives)   SocHx: denies use of EtOH, illicit drugs, or tobacco during this pregnancy or prior; denies any hx of anxiety or depression; feels safe at home     Vital Signs Last 24 Hrs  T(C): 37 (2022 14:37), Max: 37 (2022 14:37)  T(F): 98.6 (2022 14:37), Max: 98.6 (2022 14:37)  HR: 100 (2022 17:12) (89 - 115)  BP: 145/87 (2022 17:12) (128/75 - 163/92)  RR: 18 (2022 14:37) (18 - 18)    General: NAD, well-appearing  Heart: RRR  Lungs: CTAB  Abdominal: soft, gravid  Ext: non-tender, non-edematous   FHT: baseline 130s, moderate variability, +accels, -decels   [place]: uterine irritability 40 yo  at 35w1d GA who was sent from the office for elevated BPs. Denies headaches, visual disturbances, RUQ pain, epigastric pain and new-onset edema. Patient denies vaginal bleeding, contractions and leakage of fluid. She endorses good fetal movement. Denies fevers, chills, nausea and vomiting. No other complaints at this time.   LMP 2021  EDC: 22    Prenatal course significant for:   1. Polyhydramnios, OCTAVIO 33.6cm as of   2. LGA, 1623g on 3/4 (98%ile), AC 99%ile+  3. T2DM, on Insulin  4. Rh negative, Rhogam on   5. AMA   6. BMI 48.6  7. Hx of C/S x1   8. Asthma     ObHx:   G1: 2019, pCS, FT, female infant, failed IOL   PGYN: -fibroids, -cysts, denies STD hx, denies abnormal PAP smears   PMH/PSH: GERD, ASTHMA (not on meds), T2DM; 3 laparoscopic procedures for endometriosis 2 D/C endometriosis R knee sx  C/S , right breast lumpectomy in   Meds: PNV, diclegis, insulin   Allergies: PCN (hives)   SocHx: denies use of EtOH, illicit drugs, or tobacco during this pregnancy or prior; denies any hx of anxiety or depression; feels safe at home     Vital Signs Last 24 Hrs  T(C): 37 (2022 14:37), Max: 37 (2022 14:37)  T(F): 98.6 (2022 14:37), Max: 98.6 (2022 14:37)  HR: 100 (2022 17:12) (89 - 115)  BP: 145/87 (2022 17:12) (128/75 - 163/92)  RR: 18 (2022 14:37) (18 - 18)    General: NAD, well-appearing  Heart: RRR  Lungs: CTAB  Abdominal: soft, gravid  Ext: non-tender, non-edematous   FHT: baseline 130s, moderate variability, +accels, -decels   [place]: uterine irritability

## 2022-04-21 ENCOUNTER — APPOINTMENT (OUTPATIENT)
Dept: ANTEPARTUM | Facility: CLINIC | Age: 42
End: 2022-04-21

## 2022-04-21 ENCOUNTER — TRANSCRIPTION ENCOUNTER (OUTPATIENT)
Age: 42
End: 2022-04-21

## 2022-04-21 DIAGNOSIS — O99.513 DISEASES OF THE RESPIRATORY SYSTEM COMPLICATING PREGNANCY, THIRD TRIMESTER: ICD-10-CM

## 2022-04-21 DIAGNOSIS — O36.60X0 MATERNAL CARE FOR EXCESSIVE FETAL GROWTH, UNSPECIFIED TRIMESTER, NOT APPLICABLE OR UNSPECIFIED: ICD-10-CM

## 2022-04-21 DIAGNOSIS — Z3A.35 35 WEEKS GESTATION OF PREGNANCY: ICD-10-CM

## 2022-04-21 DIAGNOSIS — O09.523 SUPERVISION OF ELDERLY MULTIGRAVIDA, THIRD TRIMESTER: ICD-10-CM

## 2022-04-21 DIAGNOSIS — O24.113 PRE-EXISTING TYPE 2 DIABETES MELLITUS, IN PREGNANCY, THIRD TRIMESTER: ICD-10-CM

## 2022-04-21 DIAGNOSIS — O34.219 MATERNAL CARE FOR UNSPECIFIED TYPE SCAR FROM PREVIOUS CESAREAN DELIVERY: ICD-10-CM

## 2022-04-21 DIAGNOSIS — O40.3XX0 POLYHYDRAMNIOS, THIRD TRIMESTER, NOT APPLICABLE OR UNSPECIFIED: ICD-10-CM

## 2022-04-21 DIAGNOSIS — O14.13 SEVERE PRE-ECLAMPSIA, THIRD TRIMESTER: ICD-10-CM

## 2022-04-21 DIAGNOSIS — O99.213 OBESITY COMPLICATING PREGNANCY, THIRD TRIMESTER: ICD-10-CM

## 2022-04-21 DIAGNOSIS — O09.899 SUPERVISION OF OTHER HIGH RISK PREGNANCIES, UNSPECIFIED TRIMESTER: ICD-10-CM

## 2022-04-21 LAB
BASOPHILS # BLD AUTO: 0.02 K/UL — SIGNIFICANT CHANGE UP (ref 0–0.2)
BASOPHILS NFR BLD AUTO: 0.3 % — SIGNIFICANT CHANGE UP (ref 0–2)
EOSINOPHIL # BLD AUTO: 0.06 K/UL — SIGNIFICANT CHANGE UP (ref 0–0.5)
EOSINOPHIL NFR BLD AUTO: 0.9 % — SIGNIFICANT CHANGE UP (ref 0–6)
GLUCOSE BLDC GLUCOMTR-MCNC: 105 MG/DL — HIGH (ref 70–99)
GLUCOSE BLDC GLUCOMTR-MCNC: 150 MG/DL — HIGH (ref 70–99)
GLUCOSE BLDC GLUCOMTR-MCNC: 177 MG/DL — HIGH (ref 70–99)
GLUCOSE BLDC GLUCOMTR-MCNC: 202 MG/DL — HIGH (ref 70–99)
GLUCOSE BLDC GLUCOMTR-MCNC: 83 MG/DL — SIGNIFICANT CHANGE UP (ref 70–99)
HCT VFR BLD CALC: 31.2 % — LOW (ref 34.5–45)
HGB BLD-MCNC: 10.4 G/DL — LOW (ref 11.5–15.5)
HIV 1+2 AB+HIV1 P24 AG SERPL QL IA: SIGNIFICANT CHANGE UP
IMM GRANULOCYTES NFR BLD AUTO: 0.6 % — SIGNIFICANT CHANGE UP (ref 0–1.5)
LYMPHOCYTES # BLD AUTO: 1.37 K/UL — SIGNIFICANT CHANGE UP (ref 1–3.3)
LYMPHOCYTES # BLD AUTO: 19.7 % — SIGNIFICANT CHANGE UP (ref 13–44)
MAGNESIUM SERPL-MCNC: 2.5 MG/DL — SIGNIFICANT CHANGE UP (ref 1.8–2.6)
MAGNESIUM SERPL-MCNC: 3.1 MG/DL — HIGH (ref 1.6–2.6)
MAGNESIUM SERPL-MCNC: 4.3 MG/DL — HIGH (ref 1.6–2.6)
MAGNESIUM SERPL-MCNC: 4.5 MG/DL — HIGH (ref 1.6–2.6)
MCHC RBC-ENTMCNC: 30.5 PG — SIGNIFICANT CHANGE UP (ref 27–34)
MCHC RBC-ENTMCNC: 33.3 GM/DL — SIGNIFICANT CHANGE UP (ref 32–36)
MCV RBC AUTO: 91.5 FL — SIGNIFICANT CHANGE UP (ref 80–100)
MONOCYTES # BLD AUTO: 0.57 K/UL — SIGNIFICANT CHANGE UP (ref 0–0.9)
MONOCYTES NFR BLD AUTO: 8.2 % — SIGNIFICANT CHANGE UP (ref 2–14)
NEUTROPHILS # BLD AUTO: 4.89 K/UL — SIGNIFICANT CHANGE UP (ref 1.8–7.4)
NEUTROPHILS NFR BLD AUTO: 70.3 % — SIGNIFICANT CHANGE UP (ref 43–77)
PLATELET # BLD AUTO: 191 K/UL — SIGNIFICANT CHANGE UP (ref 150–400)
RBC # BLD: 3.41 M/UL — LOW (ref 3.8–5.2)
RBC # FLD: 13.4 % — SIGNIFICANT CHANGE UP (ref 10.3–14.5)
T PALLIDUM AB TITR SER: NEGATIVE — SIGNIFICANT CHANGE UP
WBC # BLD: 6.95 K/UL — SIGNIFICANT CHANGE UP (ref 3.8–10.5)
WBC # FLD AUTO: 6.95 K/UL — SIGNIFICANT CHANGE UP (ref 3.8–10.5)

## 2022-04-21 RX ORDER — IBUPROFEN 200 MG
600 TABLET ORAL EVERY 6 HOURS
Refills: 0 | Status: DISCONTINUED | OUTPATIENT
Start: 2022-04-21 | End: 2022-04-22

## 2022-04-21 RX ORDER — PANTOPRAZOLE SODIUM 20 MG/1
40 TABLET, DELAYED RELEASE ORAL
Refills: 0 | Status: DISCONTINUED | OUTPATIENT
Start: 2022-04-21 | End: 2022-04-22

## 2022-04-21 RX ORDER — ACETAMINOPHEN 500 MG
1 TABLET ORAL
Qty: 30 | Refills: 0
Start: 2022-04-21 | End: 2022-04-30

## 2022-04-21 RX ORDER — INSULIN GLARGINE 100 [IU]/ML
35 INJECTION, SOLUTION SUBCUTANEOUS EVERY MORNING
Refills: 0 | Status: DISCONTINUED | OUTPATIENT
Start: 2022-04-21 | End: 2022-04-22

## 2022-04-21 RX ORDER — MAGNESIUM SULFATE 500 MG/ML
2 VIAL (ML) INJECTION
Qty: 40 | Refills: 0 | Status: DISCONTINUED | OUTPATIENT
Start: 2022-04-21 | End: 2022-04-21

## 2022-04-21 RX ORDER — INSULIN LISPRO 100/ML
25 VIAL (ML) SUBCUTANEOUS
Qty: 0 | Refills: 0 | DISCHARGE

## 2022-04-21 RX ORDER — IBUPROFEN 200 MG
1 TABLET ORAL
Qty: 30 | Refills: 0
Start: 2022-04-21

## 2022-04-21 RX ADMIN — Medication 30 MILLIGRAM(S): at 01:16

## 2022-04-21 RX ADMIN — Medication 30 MILLIGRAM(S): at 05:48

## 2022-04-21 RX ADMIN — Medication 2: at 13:33

## 2022-04-21 RX ADMIN — Medication 30 MILLIGRAM(S): at 11:48

## 2022-04-21 RX ADMIN — Medication 4: at 18:59

## 2022-04-21 RX ADMIN — Medication 975 MILLIGRAM(S): at 20:42

## 2022-04-21 RX ADMIN — Medication 600 MILLIGRAM(S): at 23:30

## 2022-04-21 RX ADMIN — Medication 30 MILLIGRAM(S): at 05:41

## 2022-04-21 RX ADMIN — SIMETHICONE 80 MILLIGRAM(S): 80 TABLET, CHEWABLE ORAL at 20:41

## 2022-04-21 RX ADMIN — Medication 30 MILLIGRAM(S): at 18:13

## 2022-04-21 RX ADMIN — Medication 975 MILLIGRAM(S): at 03:13

## 2022-04-21 RX ADMIN — ENOXAPARIN SODIUM 60 MILLIGRAM(S): 100 INJECTION SUBCUTANEOUS at 11:48

## 2022-04-21 RX ADMIN — Medication 975 MILLIGRAM(S): at 05:49

## 2022-04-21 RX ADMIN — PANTOPRAZOLE SODIUM 40 MILLIGRAM(S): 20 TABLET, DELAYED RELEASE ORAL at 18:20

## 2022-04-21 RX ADMIN — Medication 6: at 23:31

## 2022-04-21 NOTE — DISCHARGE NOTE OB - PLAN OF CARE
Patient should transition to regular activity level. Resume regular diet. Patient should follow up with her OB for a postpartum checkup 1-2 weeks after delivery for BP monitoring. Patient should call her doctor sooner if she develops a fever or uncontrolled vaginal bleeding. Please call sooner if there are any other concerns. Call your doctor or return to the hospital if you have headaches that do not resolve with tylenol, vision changes, upper abdominal pain, or elevated blood pressures greater than 150/100.

## 2022-04-21 NOTE — PROGRESS NOTE ADULT - SUBJECTIVE AND OBJECTIVE BOX
CAROLYN ZAPATA is a 41 y.o  now POD#1 s/p rCS & BS @ 35w1d due to PECwSFonMg, T2DM, LGA    - COVID: Negative    S:    She is doing well, has no complaints.  Pain controlled with medication   She has yet to ambulate since the procedure  Smith in place  Denies flatus/-BM   Denies headaches, visual changes, or epigastric pain.    O:    T(C): 36.9 (22 @ 01:00), Max: 37 (22 @ 14:37)  HR: 84 (22 @ 05:00) (84 - 115)  BP: 123/75 (22 @ 05:00) (123/75 - 163/92)  RR: 18 (22 @ 05:00) (17 - 20)  SpO2: 96% (22 @ 05:00) (94% - 100%)    PE:  CV: RRR  RESP: CTA b/l  Abdomen:  Soft, appropriately-tender, non-distended, +bowel sounds.  Uterus:  Fundus firm below umbilicus  Incision: Pressure dressing Clean/dry/intact  Reflexes: Brachioradialis +1 bilaterally   VE:  +lochia  Ext:  Non-tender, no edema                           11.6   6.78  )-----------( 226      ( 2022 17:13 )             34.3         137  |  103  |  11.8  ----------------------------<  126<H>  3.9   |  22.0  |  0.48<L>    Ca    9.1      2022 15:49    TPro  6.1<L>  /  Alb  3.0<L>  /  TBili  <0.2<L>  /  DBili  x   /  AST  12  /  ALT  9   /  AlkPhos  114

## 2022-04-21 NOTE — DISCHARGE NOTE OB - CARE PLAN
1 Principal Discharge DX:	Status post repeat low transverse  section  Assessment and plan of treatment:	Patient should transition to regular activity level. Resume regular diet. Patient should follow up with her OB for a postpartum checkup 1-2 weeks after delivery for BP monitoring. Patient should call her doctor sooner if she develops a fever or uncontrolled vaginal bleeding. Please call sooner if there are any other concerns.  Secondary Diagnosis:	Preeclampsia, severe, third trimester  Assessment and plan of treatment:	Call your doctor or return to the hospital if you have headaches that do not resolve with tylenol, vision changes, upper abdominal pain, or elevated blood pressures greater than 150/100.

## 2022-04-21 NOTE — DISCHARGE NOTE OB - CARE PROVIDER_API CALL
John Mckay (DO)  Obstetrics and Gynecology  [address] [facility] [place] [address]  Phone: [phone]  Fax: [phone]  Follow Up Time:

## 2022-04-21 NOTE — DISCHARGE NOTE OB - PATIENT PORTAL LINK FT
You can access the FollowMyHealth Patient Portal offered by [facility] by registering at the following website: [email or web address]. By joining [employer] [facility]’s FollowMyHealth portal, you will also be able to view your health information using other applications (apps) compatible with our system.

## 2022-04-21 NOTE — DISCHARGE NOTE OB - HOSPITAL COURSE
CAROLYN ZAPATA is a 41 y.o  now POD#2 s/p rCS & BS @ 35w1d due to PECwSFonMg, T2DM, LGA. Patient post-operatively had an uncomplicated hospital course. Her pain was well controlled. She is tolerating a regular diet. She is ambulating independently. She was able to void after removal of witt. Labs and Vitals WNL upon discharge.

## 2022-04-21 NOTE — DISCHARGE NOTE OB - ENOXAPARIN/LOVENOX EDUCATION
Moira calling to advise she is unable to check her INR, the machine did not come in yet. Also she has no transportation to come in to have it checked. She will call tomorrow.    Enoxaparin/Lovenox/ – Compliance/Enoxaparin/Lovenox/ – Dietary Advice/Enoxaparin/Lovenox – Follow up Monitoring/Enoxaparin/Lovenox – Potential for Adverse Drug Reaction and Interactions

## 2022-04-21 NOTE — DISCHARGE NOTE OB - MEDICATION SUMMARY - MEDICATIONS TO TAKE
I will START or STAY ON the medications listed below when I get home from the hospital:    oxycodone-acetaminophen 5 mg-300 mg oral tablet  -- 1 tab(s) by mouth every 6 hours MDD:4 tabs  -- Caution federal law prohibits the transfer of this drug to any person other  than the person for whom it was prescribed.  May cause drowsiness.  Alcohol may intensify this effect.  Use care when operating dangerous machinery.  This drug may impair the ability to drive or operate machinery.  Use care until you become familiar with its effects.  This prescription cannot be refilled.  This product contains acetaminophen.  Do not use  with any other product containing acetaminophen to prevent possible liver damage.  Using more of this medication than prescribed may cause serious breathing problems.    -- Indication: For PAIN    acetaminophen 650 mg oral tablet, extended release  -- 1 tab(s) by mouth every 8 hours MDD:4  -- This product contains acetaminophen.  Do not use  with any other product containing acetaminophen to prevent possible liver damage.    -- Indication: For PAIN    ibuprofen 600 mg oral tablet  -- 1 tab(s) by mouth every 6 hours MDD:4  -- Do not take this drug if you are pregnant.  It is very important that you take or use this exactly as directed.  Do not skip doses or discontinue unless directed by your doctor.  May cause drowsiness or dizziness.  Obtain medical advice before taking any non-prescription drugs as some may affect the action of this medication.  Take with food or milk.    -- Indication: For PAIN    Levemir  -- 50 unit(s) subcutaneous 2 times a day  -- Indication: For DIABETES    raNITIdine 300 mg oral capsule  -- 1 cap(s) by mouth once a day (at bedtime)  -- Indication: For HOME MED   I will START or STAY ON the medications listed below when I get home from the hospital:    oxycodone-acetaminophen 5 mg-300 mg oral tablet  -- 1 tab(s) by mouth every 6 hours MDD:4 tabs  -- Caution federal law prohibits the transfer of this drug to any person other  than the person for whom it was prescribed.  May cause drowsiness.  Alcohol may intensify this effect.  Use care when operating dangerous machinery.  This drug may impair the ability to drive or operate machinery.  Use care until you become familiar with its effects.  This prescription cannot be refilled.  This product contains acetaminophen.  Do not use  with any other product containing acetaminophen to prevent possible liver damage.  Using more of this medication than prescribed may cause serious breathing problems.    -- Indication: For PAIN    acetaminophen 650 mg oral tablet, extended release  -- 1 tab(s) by mouth every 8 hours MDD:4  -- This product contains acetaminophen.  Do not use  with any other product containing acetaminophen to prevent possible liver damage.    -- Indication: For PAIN    ibuprofen 600 mg oral tablet  -- 1 tab(s) by mouth every 6 hours MDD:4  -- Do not take this drug if you are pregnant.  It is very important that you take or use this exactly as directed.  Do not skip doses or discontinue unless directed by your doctor.  May cause drowsiness or dizziness.  Obtain medical advice before taking any non-prescription drugs as some may affect the action of this medication.  Take with food or milk.    -- Indication: For PAIN    Lovenox 60 mg/0.6 mL injectable solution  -- 60 milligram(s) subcutaneously once a day   -- It is very important that you take or use this exactly as directed.  Do not skip doses or discontinue unless directed by your doctor.    -- Indication: For Home dvt prophylaxis    Levemir  -- 50 unit(s) subcutaneous 2 times a day  -- Indication: For DIABETES    raNITIdine 300 mg oral capsule  -- 1 cap(s) by mouth once a day (at bedtime)  -- Indication: For HOME MED

## 2022-04-21 NOTE — PROGRESS NOTE ADULT - ASSESSMENT
CAROLYN ZAPATA is a 41 y.o  now POD#1 s/p rCS & BS @ 35w1d due to PECwSFonMg, T2DM, LGA    #Postpartum  Patient feels well  Hgb: 11.6  --> Pending AM labs  Continue the current pain medication  Encourage  Ambulation  Encourage regular diet  DVT ppx: SCDs only when not ambulating + Lovenox 60mg QD  Male infant, in NICU      #Preeclampsia  - BPs overnight: 123-148/73-82, will continue to monitor  - C/w MgSO4 24h after delivery, Mg serum level pending  - Denies any headaches, visual disturbances or epigastric pain CAROLYN ZAPATA is a 41 y.o  now POD#1 s/p rCS & BS @ 35w1d due to PECwSFonMg, T2DM, LGA    #Postpartum  Patient feels well  Hgb: 11.6  --> Pending AM labs  Hx of T2DM, ISS in place  Continue the current pain medication  Encourage  Ambulation  Encourage regular diet  DVT ppx: SCDs only when not ambulating + Lovenox 60mg QD  Male infant, in NICU      #Preeclampsia  - BPs overnight: 123-148/73-82, will continue to monitor  - C/w MgSO4 24h after delivery, Mg serum level pending  - Denies any headaches, visual disturbances or epigastric pain

## 2022-04-22 VITALS
OXYGEN SATURATION: 100 % | DIASTOLIC BLOOD PRESSURE: 80 MMHG | RESPIRATION RATE: 17 BRPM | TEMPERATURE: 98 F | HEART RATE: 89 BPM | SYSTOLIC BLOOD PRESSURE: 132 MMHG

## 2022-04-22 LAB
GLUCOSE BLDC GLUCOMTR-MCNC: 170 MG/DL — HIGH (ref 70–99)
GLUCOSE BLDC GLUCOMTR-MCNC: 85 MG/DL — SIGNIFICANT CHANGE UP (ref 70–99)
GLUCOSE BLDC GLUCOMTR-MCNC: 89 MG/DL — SIGNIFICANT CHANGE UP (ref 70–99)

## 2022-04-22 PROCEDURE — 86780 TREPONEMA PALLIDUM: CPT

## 2022-04-22 PROCEDURE — 88307 TISSUE EXAM BY PATHOLOGIST: CPT

## 2022-04-22 PROCEDURE — 86769 SARS-COV-2 COVID-19 ANTIBODY: CPT

## 2022-04-22 PROCEDURE — 85730 THROMBOPLASTIN TIME PARTIAL: CPT

## 2022-04-22 PROCEDURE — U0005: CPT

## 2022-04-22 PROCEDURE — 88302 TISSUE EXAM BY PATHOLOGIST: CPT

## 2022-04-22 PROCEDURE — 86703 HIV-1/HIV-2 1 RESULT ANTBDY: CPT

## 2022-04-22 PROCEDURE — 85384 FIBRINOGEN ACTIVITY: CPT

## 2022-04-22 PROCEDURE — 83615 LACTATE (LD) (LDH) ENZYME: CPT

## 2022-04-22 PROCEDURE — 86880 COOMBS TEST DIRECT: CPT

## 2022-04-22 PROCEDURE — 85025 COMPLETE CBC W/AUTO DIFF WBC: CPT

## 2022-04-22 PROCEDURE — 86850 RBC ANTIBODY SCREEN: CPT

## 2022-04-22 PROCEDURE — 86900 BLOOD TYPING SEROLOGIC ABO: CPT

## 2022-04-22 PROCEDURE — 82962 GLUCOSE BLOOD TEST: CPT

## 2022-04-22 PROCEDURE — 86901 BLOOD TYPING SEROLOGIC RH(D): CPT

## 2022-04-22 PROCEDURE — G0463: CPT

## 2022-04-22 PROCEDURE — 81001 URINALYSIS AUTO W/SCOPE: CPT

## 2022-04-22 PROCEDURE — 85610 PROTHROMBIN TIME: CPT

## 2022-04-22 PROCEDURE — 87389 HIV-1 AG W/HIV-1&-2 AB AG IA: CPT

## 2022-04-22 PROCEDURE — 36415 COLL VENOUS BLD VENIPUNCTURE: CPT

## 2022-04-22 PROCEDURE — 84550 ASSAY OF BLOOD/URIC ACID: CPT

## 2022-04-22 PROCEDURE — 83735 ASSAY OF MAGNESIUM: CPT

## 2022-04-22 PROCEDURE — U0003: CPT

## 2022-04-22 PROCEDURE — 59025 FETAL NON-STRESS TEST: CPT

## 2022-04-22 PROCEDURE — 80053 COMPREHEN METABOLIC PANEL: CPT

## 2022-04-22 PROCEDURE — 59050 FETAL MONITOR W/REPORT: CPT

## 2022-04-22 PROCEDURE — 86870 RBC ANTIBODY IDENTIFICATION: CPT

## 2022-04-22 RX ORDER — ENOXAPARIN SODIUM 100 MG/ML
60 INJECTION SUBCUTANEOUS
Qty: 8.4 | Refills: 0
Start: 2022-04-22 | End: 2022-05-05

## 2022-04-22 RX ADMIN — INSULIN GLARGINE 35 UNIT(S): 100 INJECTION, SOLUTION SUBCUTANEOUS at 08:56

## 2022-04-22 RX ADMIN — ENOXAPARIN SODIUM 60 MILLIGRAM(S): 100 INJECTION SUBCUTANEOUS at 13:29

## 2022-04-22 RX ADMIN — SIMETHICONE 80 MILLIGRAM(S): 80 TABLET, CHEWABLE ORAL at 04:41

## 2022-04-22 RX ADMIN — Medication 4: at 19:09

## 2022-04-22 RX ADMIN — Medication 975 MILLIGRAM(S): at 04:42

## 2022-04-22 RX ADMIN — PANTOPRAZOLE SODIUM 40 MILLIGRAM(S): 20 TABLET, DELAYED RELEASE ORAL at 08:55

## 2022-04-22 RX ADMIN — Medication 600 MILLIGRAM(S): at 17:43

## 2022-04-22 RX ADMIN — Medication 600 MILLIGRAM(S): at 13:30

## 2022-04-22 RX ADMIN — Medication 600 MILLIGRAM(S): at 05:36

## 2022-04-22 RX ADMIN — SIMETHICONE 80 MILLIGRAM(S): 80 TABLET, CHEWABLE ORAL at 17:43

## 2022-04-22 RX ADMIN — Medication 975 MILLIGRAM(S): at 15:27

## 2022-04-22 RX ADMIN — Medication 975 MILLIGRAM(S): at 08:55

## 2022-04-22 NOTE — PROGRESS NOTE ADULT - ATTENDING COMMENTS
41y year old  now stable POD#2 s/p  section complicated by pre-eclampsia with severe features, stable off magnesium. Type 2 DM, follow sugars closely on 1/2 insulin dose.

## 2022-04-22 NOTE — PROGRESS NOTE ADULT - ASSESSMENT
41y year old  now POD#2 s/p  section at 35wks gestation 2/2 pre-eclampsia with severe features.   Now s/p post partum MgSO4 infusion prophylaxis course.     - vital signs stable, pain well controlled   - now s/p MgSO4, blood pressures well controlled, no signs or symptoms of uncontrolled HTN   - post op Hgb from 11.6 > 10.4, no symptoms of anemia   - encouraged continued PO intake, ambulation, and pain control PRN   - male infant, circumcision desired, awaiting pediatric clearance   - continues with diabetic control, on Lantus 35u AM, and ISS coverage, will titrate as needed      41y year old  now POD#2 s/p  section at 35wks gestation 2/2 pre-eclampsia with severe features.   Now s/p post partum MgSO4 infusion prophylaxis course.     - vital signs stable, pain well controlled   - now s/p MgSO4, blood pressures well controlled, no signs or symptoms of uncontrolled HTN   - post op Hgb from 11.6 > 10.4, no symptoms of anemia   - encouraged continued PO intake, ambulation, and pain control PRN   - male infant, circumcision desired, awaiting pediatric clearance   - continues with diabetic control, on Lantus 35u AM, and ISS coverage, will titrate as needed   - anticipated discharge tomorrow, POD#3, will be discharged with continued chemical DVT prophylaxis for 14d (Lovenox)      41y year old  now POD#2 s/p  section at 35wks gestation 2/2 pre-eclampsia with severe features.   Now s/p post partum MgSO4 infusion prophylaxis course.     - vital signs stable, pain well controlled   - now s/p MgSO4, blood pressures well controlled, no signs or symptoms of uncontrolled HTN   - post op Hgb from 11.6 > 10.4, no symptoms of anemia   - encouraged continued PO intake, ambulation, and pain control PRN   - male infant, circumcision desired, awaiting pediatric clearance   - continues with diabetic control, on Lantus 35u AM, and ISS coverage, will titrate as needed   - anticipated discharge tomorrow, POD#3, will be discharged with continued chemical DVT prophylaxis for 14d (Lovenox)     Addendum:    Subjective Hx, Physical Exam, & Laboratory results reviewed and Pt seen and examined at bedside.  I agree with the Resident Physician's assessment and plan of care, as discussed above.  She was given the opportunity to ask questions and all were addressed.    John Mckay, DO

## 2022-04-22 NOTE — PROGRESS NOTE ADULT - SUBJECTIVE AND OBJECTIVE BOX
41y year old  now POD#2 s/p  section at 35wks gestation 2/2 pre-eclampsia with severe features.   Now s/p post partum MgSO4 infusion prophylaxis course.      No acute overnight events. Pain well controlled on PRN pain medications.   Patient is ambulating and spontaneously voiding without major difficulty, +Flatus, -BM  Tolerating regular diet, denies nausea/vomiting.   Reports minimal lochia.   +breast feeding, -breast tenderness  Denies dizziness, lightheadedness, SOB, palpitations, or fatigue at rest or while ambulating.   Denies fevers, chills, malaise, fatigue, and myalgia.     VS:   Vital Signs Last 24 Hrs  T(C): 36.4 (2022 04:46), Max: 36.9 (2022 20:30)  T(F): 97.6 (2022 04:46), Max: 98.5 (2022 20:30)  HR: 86 (2022 04:46) (84 - 100)  BP: 144/87 (2022 04:46) (115/72 - 150/92)  RR: 18 (2022 04:46) (18 - 19)  SpO2: 100% (2022 04:46) (96% - 100%)    Physical Exam:  General: NAD  Cardio: RRR  Lungs: CTAB   Abdomen: soft, ND, firm fundus palpated at the umbilicus. Incision clean, dry, and intact.  Ext: nontender lower extremities, bilaterally.     Labs:                        10.4   6.95  )-----------( 191      ( 2022 06:22 )             31.2

## 2022-04-25 ENCOUNTER — APPOINTMENT (OUTPATIENT)
Dept: ANTEPARTUM | Facility: CLINIC | Age: 42
End: 2022-04-25

## 2022-04-28 ENCOUNTER — APPOINTMENT (OUTPATIENT)
Dept: ANTEPARTUM | Facility: CLINIC | Age: 42
End: 2022-04-28

## 2022-04-29 ENCOUNTER — APPOINTMENT (OUTPATIENT)
Dept: OBGYN | Facility: CLINIC | Age: 42
End: 2022-04-29
Payer: COMMERCIAL

## 2022-04-29 VITALS
DIASTOLIC BLOOD PRESSURE: 90 MMHG | HEIGHT: 65 IN | BODY MASS INDEX: 48.82 KG/M2 | SYSTOLIC BLOOD PRESSURE: 140 MMHG | WEIGHT: 293 LBS

## 2022-04-29 DIAGNOSIS — O26.899 OTHER SPECIFIED PREGNANCY RELATED CONDITIONS, UNSPECIFIED TRIMESTER: ICD-10-CM

## 2022-04-29 DIAGNOSIS — Q99.9 CHROMOSOMAL ABNORMALITY, UNSPECIFIED: ICD-10-CM

## 2022-04-29 DIAGNOSIS — O24.119 PRE-EXISTING TYPE 2 DIABETES MELLITUS, TYPE 2, IN PREGNANCY, UNSPECIFIED TRIMESTER: ICD-10-CM

## 2022-04-29 DIAGNOSIS — Z67.91 OTHER SPECIFIED PREGNANCY RELATED CONDITIONS, UNSPECIFIED TRIMESTER: ICD-10-CM

## 2022-04-29 DIAGNOSIS — O09.521 SUPERVISION OF ELDERLY MULTIGRAVIDA, FIRST TRIMESTER: ICD-10-CM

## 2022-04-29 DIAGNOSIS — O34.219 MATERNAL CARE FOR UNSPECIFIED TYPE SCAR FROM PREVIOUS CESAREAN DELIVERY: ICD-10-CM

## 2022-04-29 DIAGNOSIS — O09.519 SUPERVISION OF ELDERLY PRIMIGRAVIDA, UNSPECIFIED TRIMESTER: ICD-10-CM

## 2022-04-29 DIAGNOSIS — O09.529 SUPERVISION OF ELDERLY MULTIGRAVIDA, UNSPECIFIED TRIMESTER: ICD-10-CM

## 2022-04-29 DIAGNOSIS — Z87.828 PERSONAL HISTORY OF OTHER (HEALED) PHYSICAL INJURY AND TRAUMA: ICD-10-CM

## 2022-04-29 PROCEDURE — 99024 POSTOP FOLLOW-UP VISIT: CPT

## 2022-04-29 NOTE — PHYSICAL EXAM
[FreeTextEntry7] : Pfannenstiel incision healing well, some erythema on the right of the Pfannenstiel incision

## 2022-04-29 NOTE — REASON FOR VISIT
[Post-Op Visit] : a post-op visit for [FreeTextEntry2] : Status post repeat  section with bilateral salpingectomy

## 2022-04-29 NOTE — COUNSELING
[Nutrition/ Exercise/ Weight Management] : nutrition, exercise, weight management [Vitamins/Supplements] : vitamins/supplements [Medication Management] : medication management [Pre/Post Op Instructions] : pre/post op instructions

## 2022-04-29 NOTE — PLAN
[FreeTextEntry1] : \par Patient's blood pressure was repeated with a wall-mounted sphygmomanometer resulting 140/80 x 3 and patient was counseled on signs and symptoms of preeclampsia including ER precautions.  Patient's Pfannenstiel incision is healing well, but did have some erythema on the right of the incision and patient was directed on wound care.  She is up and ambulating and is to continue her Lovenox 40 mg daily for total of 2 weeks status post delivery.  She has a history of abnormal periods, but would like to see what happens when she has not using a Mirena IUD.  Patient did have a bilateral salpingectomy for permanent sterilization.  She was given call, follow-up, ER precautions and all questions were addressed.  She will follow-up in 4 weeks time, unless otherwise needed.

## 2022-04-29 NOTE — HISTORY OF PRESENT ILLNESS
[FreeTextEntry1] : 41-year-old female -1-0-2 presenting for her postoperative check status post repeat low-transverse  section with bilateral salpingectomy for permanent sterilization on 2022.  Patient's delivery was complicated by preeclampsia with severe features in the late  period, at 35 weeks and 1 day with [facility] recommendation for immediate delivery.  Her pregnancy was also complicated by advanced maternal age and pre-existing diabetes type 2, Rh- blood type status post RhoGAM.  Patient is doing well postoperatively and reports minimal pain.  Her pain is well controlled with ibuprofen and Tylenol, as written.  She reports no signs or symptoms of inflammation or infection at her wound site.  She is not breast-feeding and currently bottlefeeding.

## 2022-05-02 ENCOUNTER — APPOINTMENT (OUTPATIENT)
Dept: ANTEPARTUM | Facility: CLINIC | Age: 42
End: 2022-05-02

## 2022-05-04 LAB — SURGICAL PATHOLOGY STUDY: SIGNIFICANT CHANGE UP

## 2022-05-04 NOTE — OB RN PATIENT PROFILE - NS PRO DEPRESSION SCREENING Y/N1
Intubation    Date/Time: 5/4/2022 10:37 AM  Performed by: Alyson Valentin CRNA  Authorized by: Radhika Coombs MD     Intubation:     Induction:  Intravenous    Intubated:  Postinduction    Mask Ventilation:  Moderately difficult with oral airway    Attempts:  1    Attempted By:  CRNA    Method of Intubation:  Video laryngoscopy    Blade:  Khoury 4    Laryngeal View Grade: Grade IIb - only the arytenoids and epiglottis seen      Difficult Airway Encountered?: No      Complications:  None    Airway Device:  Oral endotracheal tube    Airway Device Size:  7.5    Style/Cuff Inflation:  Cuffed (inflated to minimal occlusive pressure)    Tube secured:  23    Secured at:  The lips    Placement Verified By:  Capnometry and Revisualization with laryngoscopy    Complicating Factors:  Small mouth, large/floppy epiglottis, oropharyngeal edema or fat and poor neck/head extension    Findings Post-Intubation:  BS equal bilateral and atraumatic/condition of teeth unchanged     no

## 2022-05-05 ENCOUNTER — APPOINTMENT (OUTPATIENT)
Dept: ANTEPARTUM | Facility: CLINIC | Age: 42
End: 2022-05-05

## 2022-05-09 ENCOUNTER — APPOINTMENT (OUTPATIENT)
Dept: ANTEPARTUM | Facility: CLINIC | Age: 42
End: 2022-05-09

## 2022-05-11 NOTE — CHART NOTE - NSCHARTNOTEFT_GEN_A_CORE
Antibody Interpretation: Anti-D secondary to RhIG    Patient is a 40 yo  at 35w1d GA who was sent from the office for elevated BPs. Denies headaches, visual disturbances, RUQ pain, epigastric pain and new-onset edema. Patient denies vaginal bleeding, contractions and leakage of fluid. She endorses good fetal movement. Denies fevers, chills, nausea and vomiting. No other complaints at this time.  Patient is blood group O Rh(D) negative, and she received pre- RhIG on 22. Blood sample on 22 has a positive antibody screen, with anti-D identified in patient's plasma. Anti-D is most likely due to prior administration of RhIG. Passive Anti-D due to Rh Immune Globulin administration is not implicated in Hemolytic Transfusion Reactions or Hemolytic Disease of the Fetus and Paul. Passive Anti-D can cross the placenta and cause a positive HOLLY in a . If transfusion is clinically indicated, and Rh(D) negative blood is available in blood bank inventory, the patient should receive Rh(D) negative blood, crossmatch compatible through the [facility] phase of testing until the RhIG is no longer detectable in the patient’s plasma.

## 2022-05-12 ENCOUNTER — APPOINTMENT (OUTPATIENT)
Dept: ANTEPARTUM | Facility: CLINIC | Age: 42
End: 2022-05-12

## 2022-05-16 ENCOUNTER — APPOINTMENT (OUTPATIENT)
Dept: ANTEPARTUM | Facility: CLINIC | Age: 42
End: 2022-05-16

## 2022-05-23 ENCOUNTER — APPOINTMENT (OUTPATIENT)
Dept: OBGYN | Facility: CLINIC | Age: 42
End: 2022-05-23
Payer: COMMERCIAL

## 2022-05-23 VITALS
WEIGHT: 261 LBS | HEIGHT: 65 IN | BODY MASS INDEX: 43.49 KG/M2 | DIASTOLIC BLOOD PRESSURE: 68 MMHG | SYSTOLIC BLOOD PRESSURE: 102 MMHG

## 2022-05-23 DIAGNOSIS — Z09 ENCOUNTER FOR FOLLOW-UP EXAMINATION AFTER COMPLETED TREATMENT FOR CONDITIONS OTHER THAN MALIGNANT NEOPLASM: ICD-10-CM

## 2022-05-23 DIAGNOSIS — Z98.891 HISTORY OF UTERINE SCAR FROM PREVIOUS SURGERY: ICD-10-CM

## 2022-05-23 DIAGNOSIS — Z90.79 ACQUIRED ABSENCE OF OTHER GENITAL ORGAN(S): ICD-10-CM

## 2022-05-23 PROCEDURE — 0503F POSTPARTUM CARE VISIT: CPT

## 2022-05-23 NOTE — REASON FOR VISIT
[Acute] : an acute visit for [FreeTextEntry2] : Possible wound infection and report of elevated blood pressure

## 2022-05-23 NOTE — HISTORY OF PRESENT ILLNESS
[FreeTextEntry1] : 41-year-old female -1-0-2 presenting office with report of a possible wound infection.  Patient was working in her yard and noticed some bleeding and believes there is a stable protruding from her wound.  There is also bright light blood.  She was also reported superficial heat rash, or possible infection.  She also reports taking her blood pressure at home and it resulting in the 150s/90s sporadically.  Her pregnancy was significant for preeclampsia with severe features and she was not discharged on an antihypertensive.\par \par She is status post repeat low-transverse  section with bilateral salpingectomy for permanent sterilization on 2022.  Patient's delivery was complicated by preeclampsia with severe features in the late  period, at 35 weeks and 1 day with [facility] recommendation for immediate delivery.  Her pregnancy was also complicated by advanced maternal age and pre-existing diabetes type 2, Rh- blood type status post RhoGAM.  She is not breast-feeding and currently bottlefeeding.

## 2022-05-23 NOTE — PLAN
[FreeTextEntry1] : \par Subjective history and physical exam consistent with a subdermal staples protruding from midline in her Pfannenstiel incision which was removed.  She has a possible superficial wound infection and some deeper subserosal hematoma on palpation.  Patient will be started on Levaquin once daily for 10 days time.\par \par She also reports sporadic elevated blood pressures at home with a history of preeclampsia with severe features and not discharged on antihypertensive medications.  Patient will be started on labetalol 100 mg twice daily and be seen by cardiology.  She was handed referral information and will present for full assessment via cardiology.  She was given call, follow-up, ER precautions regarding signs and symptoms of elevated blood pressure.\par \par She was given option ask questions all questions were addressed.  She will return to office as directed.

## 2022-05-23 NOTE — PHYSICAL EXAM
[FreeTextEntry7] : Pfannenstiel incision with superficial inflammation, subdermal staple protruding and removed

## 2022-05-27 ENCOUNTER — APPOINTMENT (OUTPATIENT)
Dept: OBGYN | Facility: CLINIC | Age: 42
End: 2022-05-27

## 2022-06-28 ENCOUNTER — NON-APPOINTMENT (OUTPATIENT)
Age: 42
End: 2022-06-28

## 2022-06-28 ENCOUNTER — APPOINTMENT (OUTPATIENT)
Dept: CARDIOLOGY | Facility: CLINIC | Age: 42
End: 2022-06-28

## 2022-06-28 VITALS
TEMPERATURE: 97.9 F | WEIGHT: 267 LBS | RESPIRATION RATE: 12 BRPM | SYSTOLIC BLOOD PRESSURE: 128 MMHG | BODY MASS INDEX: 44.48 KG/M2 | HEIGHT: 65 IN | HEART RATE: 79 BPM | DIASTOLIC BLOOD PRESSURE: 80 MMHG | OXYGEN SATURATION: 96 %

## 2022-06-28 VITALS — DIASTOLIC BLOOD PRESSURE: 80 MMHG | SYSTOLIC BLOOD PRESSURE: 130 MMHG

## 2022-06-28 DIAGNOSIS — Z78.9 OTHER SPECIFIED HEALTH STATUS: ICD-10-CM

## 2022-06-28 DIAGNOSIS — R06.00 DYSPNEA, UNSPECIFIED: ICD-10-CM

## 2022-06-28 DIAGNOSIS — Z87.59 PERSONAL HISTORY OF OTHER COMPLICATIONS OF PREGNANCY, CHILDBIRTH AND THE PUERPERIUM: ICD-10-CM

## 2022-06-28 DIAGNOSIS — R51.9 HEADACHE, UNSPECIFIED: ICD-10-CM

## 2022-06-28 DIAGNOSIS — Z86.79 PERSONAL HISTORY OF OTHER DISEASES OF THE CIRCULATORY SYSTEM: ICD-10-CM

## 2022-06-28 DIAGNOSIS — R03.0 ELEVATED BLOOD-PRESSURE READING, W/OUT DIAGNOSIS OF HYPERTENSION: ICD-10-CM

## 2022-06-28 DIAGNOSIS — I10 ESSENTIAL (PRIMARY) HYPERTENSION: ICD-10-CM

## 2022-06-28 DIAGNOSIS — R01.1 CARDIAC MURMUR, UNSPECIFIED: ICD-10-CM

## 2022-06-28 PROCEDURE — 93000 ELECTROCARDIOGRAM COMPLETE: CPT

## 2022-06-28 PROCEDURE — 99205 OFFICE O/P NEW HI 60 MIN: CPT

## 2022-06-28 RX ORDER — LEVOFLOXACIN 500 MG/1
500 TABLET, FILM COATED ORAL
Qty: 10 | Refills: 0 | Status: DISCONTINUED | COMMUNITY
Start: 2022-05-23 | End: 2022-06-28

## 2022-06-28 RX ORDER — MULTIVITAMIN
CAPSULE ORAL
Refills: 0 | Status: ACTIVE | COMMUNITY

## 2022-06-28 RX ORDER — INSULIN GLARGINE 300 U/ML
300 INJECTION, SOLUTION SUBCUTANEOUS
Refills: 0 | Status: DISCONTINUED | COMMUNITY
End: 2022-06-28

## 2022-06-28 RX ORDER — HUMAN RHO(D) IMMUNE GLOBULIN 300 UG/1
1500 INJECTION, SOLUTION INTRAMUSCULAR
Qty: 1 | Refills: 0 | Status: DISCONTINUED | COMMUNITY
Start: 2022-03-08 | End: 2022-06-28

## 2022-06-28 RX ORDER — DOXYLAMINE SUCCINATE AND PYRIDOXINE HYDROCHLORIDE 10; 10 MG/1; MG/1
10-10 TABLET, DELAYED RELEASE ORAL
Qty: 90 | Refills: 2 | Status: DISCONTINUED | COMMUNITY
Start: 2021-10-12 | End: 2022-06-28

## 2022-06-28 RX ORDER — TRICLOSAN 0.6 ML/100ML
LIQUID TOPICAL DAILY
Refills: 0 | Status: ACTIVE | COMMUNITY

## 2022-06-28 RX ORDER — INSULIN LISPRO 200 [IU]/ML
200 INJECTION, SOLUTION SUBCUTANEOUS
Refills: 0 | Status: ACTIVE | COMMUNITY

## 2022-06-28 RX ORDER — FLASH GLUCOSE SENSOR
KIT MISCELLANEOUS
Refills: 0 | Status: ACTIVE | COMMUNITY
Start: 2022-04-18

## 2022-06-28 RX ORDER — LABETALOL HYDROCHLORIDE 100 MG/1
100 TABLET, FILM COATED ORAL
Qty: 180 | Refills: 3 | Status: ACTIVE | COMMUNITY
Start: 2022-05-23 | End: 1900-01-01

## 2022-06-28 RX ORDER — FAMOTIDINE 20 MG/1
20 TABLET, FILM COATED ORAL DAILY
Refills: 0 | Status: ACTIVE | COMMUNITY

## 2022-06-28 RX ORDER — PRENATAL 56/IRON/FOLIC AC/DHA 35-5-1 MG
35-5-1-200 CAPSULE ORAL
Qty: 30 | Refills: 11 | Status: DISCONTINUED | COMMUNITY
Start: 2018-12-19 | End: 2022-06-28

## 2022-06-28 RX ORDER — INSULIN GLARGINE 300 U/ML
300 INJECTION, SOLUTION SUBCUTANEOUS
Refills: 0 | Status: ACTIVE | COMMUNITY
Start: 2022-01-03

## 2022-07-04 NOTE — DISCUSSION/SUMMARY
[Patient] : the patient [Risks] : risks [Benefits] : benefits [Alternatives] : alternatives [With Me] : with me [___ Month(s)] : in [unfilled] month(s) [EKG obtained to assist in diagnosis and management of assessed problem(s)] : EKG obtained to assist in diagnosis and management of assessed problem(s) [FreeTextEntry1] : This is a 42 year old woman with history of obesity, 21 pregnancies and 2 live births.  here with pre eclampsia  and  and hypertension anew ly diagnosed hypertension \par \par 1)  Hypertension :    2D echo. weight loss and diet and exercise.  cotntinue labetolol 100 bID  ;  patient has Diabetes Mellitus on isnus,in. will check urinoe protein, and urine creatinie to see if she would benefit from losartan since she has migraine for now we will use beta blocker \par 2)   dyspnea on exertion : weight loss:  2D echo\par 3) Cardiac murmur: high flow state: 2D echo. \par 4) Will order and review ECG for the above mentioned diagnosis/condition/symptoms as

## 2022-07-04 NOTE — HISTORY OF PRESENT ILLNESS
[FreeTextEntry1] : preeclampsia.\par \par This is a 42 year old woman with history of obesity, 21 pregnancies and 2 live births.  here with pre eclampsia nad  and hypertension anew ly diagnosed hypertension \par her current pregnancy , she had pre eclampsia.  she delivered at . And he was 5 weeks  she was hypertensive,.  and now she is on labetolol twice a day. \par she does have family history of hypertension .  she ususally get migraine headaches.  since her pregnancy she was having one migraine headaceh every day.  and she is compliant with meds.  \par she has not checked her Bp when she got a headache. a\par \par she does not get chest pain . some dyspnea on exertion . \par \par mother:  hypertension and mitral valve prolapsed and drug ause and MI at age 27\par Grandmother: hypertension \par Father: No myocardial infarction . no cerebrovascular accident . no hypertension \par \par No smokign. no alcohol. no drugs. \par east s a lot of pasta .bread.

## 2022-07-04 NOTE — PHYSICAL EXAM
[Well Developed] : well developed [Well Nourished] : well nourished [No Acute Distress] : no acute distress [Normal Conjunctiva] : normal conjunctiva [Normal Venous Pressure] : normal venous pressure [No Carotid Bruit] : no carotid bruit [No Rub] : no rub [No Gallop] : no gallop [Clear Lung Fields] : clear lung fields [Good Air Entry] : good air entry [No Respiratory Distress] : no respiratory distress  [Soft] : abdomen soft [Non Tender] : non-tender [No Masses/organomegaly] : no masses/organomegaly [Normal Bowel Sounds] : normal bowel sounds [Normal Gait] : normal gait [No Edema] : no edema [No Cyanosis] : no cyanosis [No Clubbing] : no clubbing [No Varicosities] : no varicosities [No Rash] : no rash [No Skin Lesions] : no skin lesions [Moves all extremities] : moves all extremities [No Focal Deficits] : no focal deficits [Normal Speech] : normal speech [Alert and Oriented] : alert and oriented [Normal memory] : normal memory [de-identified] : 3/6 systolic murmur

## 2022-07-13 ENCOUNTER — APPOINTMENT (OUTPATIENT)
Dept: CARDIOLOGY | Facility: CLINIC | Age: 42
End: 2022-07-13

## 2022-07-13 PROCEDURE — 93306 TTE W/DOPPLER COMPLETE: CPT

## 2022-07-19 ENCOUNTER — TRANSCRIPTION ENCOUNTER (OUTPATIENT)
Age: 42
End: 2022-07-19

## 2022-10-27 ENCOUNTER — APPOINTMENT (OUTPATIENT)
Dept: OBGYN | Facility: CLINIC | Age: 42
End: 2022-10-27

## 2022-11-22 ENCOUNTER — APPOINTMENT (OUTPATIENT)
Dept: PLASTIC SURGERY | Facility: CLINIC | Age: 42
End: 2022-11-22

## 2022-11-22 VITALS
OXYGEN SATURATION: 98 % | HEART RATE: 82 BPM | DIASTOLIC BLOOD PRESSURE: 86 MMHG | BODY MASS INDEX: 47.95 KG/M2 | WEIGHT: 274 LBS | RESPIRATION RATE: 12 BRPM | HEIGHT: 63.5 IN | SYSTOLIC BLOOD PRESSURE: 153 MMHG | TEMPERATURE: 97.2 F

## 2022-11-22 PROCEDURE — 99203 OFFICE O/P NEW LOW 30 MIN: CPT

## 2022-11-22 RX ORDER — INSULIN LISPRO 100 [IU]/ML
100 INJECTION, SOLUTION INTRAVENOUS; SUBCUTANEOUS
Qty: 45 | Refills: 0 | Status: ACTIVE | COMMUNITY
Start: 2022-10-10

## 2022-11-22 RX ORDER — DOXYCYCLINE HYCLATE 100 MG/1
100 TABLET ORAL
Qty: 14 | Refills: 0 | Status: ACTIVE | COMMUNITY
Start: 2022-09-02

## 2022-12-06 NOTE — HISTORY OF PRESENT ILLNESS
[FreeTextEntry1] : Ms. CAROLYN ZAPATA  is a 42 year  old female  with a past medical history of HTN, MORENO, who presents to discuss a masses on Her  scalp which have previously been excised and continue to recur.  She  feels the mass has been slowly growing and becoming increasingly more painful.  She  is worried about its etiology and would like to discuss excision. \par \par non smoker\par no anticoagulation or bleeding disorders\par

## 2022-12-19 NOTE — DISCHARGE NOTE OB - BRAND OF COVID-19 VACCINATION
CARDIOLOGY  NOTE    2022    Nicho Strange (:  1950) is a 71 y.o. female,an established patient with Dr. Rose Bautista, here for evaluation of the following chief complaint(s):  6 Month Follow-Up, Chest Pain (Twinges like she always have once in a while), Dizziness (When getting up too fast), Shortness of Breath (On exertion), and Edema (Both legs, ankle and feet)        SUBJECTIVE/OBJECTIVE:    KAITLYN Wiley  has Past medical history as noted below. She states that she has been feeling better recently. Her shortness of breath is not worse than normal, her swelling improves by morning, dizziness goes away if she stands up slower and the twinges in her chest are very short lived and are random. She struggles with RLS   She had cardiac cath in  showing no significant CAD  She had atrial tachycardia ablation in Feb by EPS , she was taken off anticoagulation due to colitis but she is on aspirin     She gets dizzy and feels unbalanced when walking . [facility]. She had ileostomy in [place] due to colitis and is now able to travel. She is having a lot of tremors and jerking of legs , she saw  neurology . She is exhausted by just doing laundry and walking    She has a strong family history of early coronary artery disease. Review of Systems   Constitutional:  Negative for fatigue and fever. Respiratory:  Positive for shortness of breath. Negative for cough. Cardiovascular:  Positive for chest pain. Negative for palpitations and leg swelling. Musculoskeletal:  Negative for arthralgias and gait problem. Neurological:  Positive for dizziness. Negative for syncope, weakness, light-headedness and headaches.      Vitals:    22 1347   BP: 100/60   Site: Right Upper Arm   Position: Sitting   Cuff Size: Medium Adult   Pulse: 67   SpO2: 96%   Weight: 186 lb (84.4 kg)   Height: 5' 9\" (1.753 m)       Wt Readings from Last 3 Encounters: 12/19/22 186 lb (84.4 kg)   11/09/22 184 lb (83.5 kg)   08/15/22 187 lb (84.8 kg)       BP Readings from Last 3 Encounters:   12/19/22 100/60   11/09/22 118/74   08/15/22 102/62       Prior to Admission medications    Medication Sig Start Date End Date Taking? Authorizing Provider   B Complex Vitamins (B-COMPLEX/B-12 PO) Take 1,500 mg by mouth Taking 4,500 mg a day   Yes Historical Provider, MD   gabapentin (NEURONTIN) 400 MG capsule Take 1 capsule by mouth at bedtime for 92 days. 11/9/22 2/9/23 Yes Welton Hashimoto, APRN - CNP   Cholecalciferol (VITAMIN D3) 1.25 MG (69334 UT) TABS Take 50,000 Units by mouth once a week 8/9/22  Yes Welton Hashimoto, APRN - CNP   traZODone (DESYREL) 50 MG tablet Patient states she is takingg 200 mg because she googled it was okay 8/2/22  Yes Historical Provider, MD   metoprolol tartrate (LOPRESSOR) 50 MG tablet Take 1 tablet by mouth in the morning and 1 tablet before bedtime. 7/18/22  Yes FABIEN Jim CNP   aspirin EC 81 MG EC tablet Take 1 tablet by mouth daily 4/8/22  Yes FABIEN Jim CNP   magnesium oxide (MAG-OX) 400 MG tablet Take 1 tablet by mouth daily 3/17/22  Yes FABIEN Jim CNP   melatonin 3 MG TABS tablet Take 20 mg by mouth daily    Yes Historical Provider, MD   lansoprazole (PREVACID) 30 MG delayed release capsule TAKE 1 CAPSULE BY MOUTH ONCE DAILY BEFORE A MEAL 2/15/21  Yes Historical Provider, MD   ferrous sulfate (IRON 325) 325 (65 Fe) MG tablet Take 325 mg by mouth daily (with breakfast)   Yes Historical Provider, MD   Handicap Placard [id number] [facility] by Does not apply route 6/5/19  Yes Henrique Tripathi MD   atorvastatin (LIPITOR) 10 MG tablet Take 10 mg by mouth daily Pt states she's taking 3x wk   Yes Historical Provider, MD   dicyclomine (BENTYL) 20 MG tablet TAKE 1 TABLET BY MOUTH 4 TIMES DAILY AS NEEDED  Patient not taking: No sig reported 4/21/22   Historical Provider, MD       Physical Exam  Vitals reviewed. Constitutional:       General: She is not in acute distress. Appearance: Normal appearance. She is not ill-appearing. HENT:      Head: Atraumatic. Neck:      Vascular: No carotid bruit. Cardiovascular:      Rate and Rhythm: Normal rate and regular rhythm. Pulses: Normal pulses. Heart sounds: Normal heart sounds. No murmur heard. Pulmonary:      Effort: Pulmonary effort is normal. No respiratory distress. Breath sounds: Normal breath sounds. Musculoskeletal:         General: No swelling or deformity. Cervical back: Neck supple. No muscular tenderness. Neurological:      Mental Status: She is alert.        Health Maintenance   Topic Date Due    Depression Screen  Never done    Hepatitis C screen  Never done    Breast cancer screen  Never done    Shingles vaccine (1 of 2) Never done    DEXA (modify frequency per FRAX score)  Never done    Annual Wellness Visit (AWV)  Never done    COVID-19 Vaccine (4 - Booster for Moderna series) 01/07/2022    Lipids  12/02/2022    Colorectal Cancer Screen  12/15/2026    DTaP/Tdap/Td vaccine (2 - Td or Tdap) 03/02/2027    Flu vaccine  Completed    Pneumococcal 65+ years Vaccine  Completed    Hepatitis A vaccine  Aged Out    Hib vaccine  Aged Out    Meningococcal (ACWY) vaccine  Aged Out       Lab Review   Lab Results   Component Value Date/Time    CHOL 131 12/02/2021 12:00 AM    TRIG 87 12/02/2021 12:00 AM    HDL 45 12/02/2021 12:00 AM        Stress test  10/17/19   Stress Type: Exercise      Peak HR: 131 bpm                       HR Response: Appropriate   Peak BP: 158/80 mmHg                   BP Response: Normal resting BP -   Predicted HR: 152 bpm                  appropriate response   % of predicted HR: 86                  HR BP Product: [id number]   Exercise Duration: 04:00 min           Max Exercise: 5.8 METS   Reason for Termination: Target heart   rate                                   Exercise Effort: Fair     Echo 10/17/19   Summary   Normal left ventricle structure and systolic function with an ejection   fraction of 55-60%. Grade I diastolic dysfunction. Sclerotic, but non-stenotic aortic valve. Mild aortic regurgitation with a pressure half time of 823. Normal pulmonary artery pressure. No evidence of pericardial effusion. Cath Feb 2022      Procedure Summary   Access : radial Indication : abnormal stress test /coronary   calcification on Cardiac cta   1. LAD and Circ are widely patent   2. RCA is widely patent   3. LVEDP was 10 mmHG   LMCA: Normal and No Angiographicalyl Significant Disease. widely patent     LAD: Normal (no stenosis %) and No Angiographicalyl Significant Disease. Widely  patent , 3 small diag which are patent     LCx: Normal (no stenosis %) and No Angiographicalyl Significant Disease. Circ  is patent , OM are patent     RCA: Normal (no stenosis %) and No Angiographicalyl Significant Disease. RCA is  widely patent , PDA and PLV are widely patent        Event monitor Jan 2021  30-day monitor worn from 12/17/2021 to 1/15/2021 with 14 patient triggers and 17 triggers. Lowest heart rate was 58 at 4:46 AM average heart rate was 83 highest heart rate was 163. Patient noted to have runs of atrial flutter and wide-complex tachycardia of six beats possible V. tach rapid heart rate 146 which is possible aberrant conduction episode of atrial flutter with a heart rate of 143 and episodes of SVT with a heart rate of 160 abnormal 30-day monitor consider EPS evaluation    ASSESSMENT/PLAN:  Chest pain  Improved. Twinges, that are very short in duration. Cath showed no significant cad, I think most of her pain symptoms are non cardiac         Palpitations  Improved with metoprolol 50 mg bid, SVT and wide complex runs ? S/p ablation for atrial tachycardia   No anticoagulation due to colitis, seen by EPS continue aspirin 81   eliquis was stopped         Restless leg and tremors  Related to RLS   Has hx of venous insufficiency.   Conkle venous ablation previous bilateral thigh's  Has increased swelling   Will check venous US      Hypertension  Controlled currently. Due to her amount of dizziness she has a handicap placard. SOB (shortness of breath)  She has lower extremity swelling and shortness of breath but echo is normal, leg venous ablation was done by Dr Andre Hdez. Dyslipidemia   All available lab work was reviewed. Patient was advised to repeat lab work before next visit. Necessary orders were placed , instructions given by myself   Counseled extensively and medication compliance urged. We discussed that for the  prevention of ASCVD our  goal is aggressive risk modification. Patient is encouraged to exercise even a brisk walk for 30 minutes  at least 3 to 4 times a week   Various goals were discussed and questions answered. Continue current medications. Appropriate prescriptions are addressed and refills ordered. Questions answered and patient verbalizes understanding. Call for any problems, questions, or concerns. Return in about 6 months (around 6/19/2023). An electronic signature was used to authenticate this note.     Electronically signed by FABIEN De Leon CNP on 12/19/2022 at 2:17 PM Pfizer dose 1 and 2

## 2023-01-04 ENCOUNTER — APPOINTMENT (OUTPATIENT)
Dept: CARDIOLOGY | Facility: CLINIC | Age: 43
End: 2023-01-04

## 2023-01-23 NOTE — DISCHARGE NOTE OB - FOUL SMELLING VAGINAL DISCHARGE
Statement Selected clear to auscultation bilaterally/no wheezes/no rales/no rhonchi/no respiratory distress/no use of accessory muscles/no subcutaneous emphysema/airway patent/breath sounds equal/good air movement/respirations non-labored/no intercostal retractions/no dull ness or hyperresonance to percussion/respiratory distress

## 2023-01-26 RX ORDER — OXYCODONE 5 MG/1
5 TABLET ORAL
Qty: 10 | Refills: 0 | Status: ACTIVE | COMMUNITY
Start: 2023-01-26 | End: 1900-01-01

## 2023-02-02 LAB — SARS-COV-2 N GENE NPH QL NAA+PROBE: NOT DETECTED

## 2023-02-03 ENCOUNTER — APPOINTMENT (OUTPATIENT)
Dept: PLASTIC SURGERY | Facility: AMBULATORY SURGERY CENTER | Age: 43
End: 2023-02-03
Payer: COMMERCIAL

## 2023-02-03 ENCOUNTER — RESULT REVIEW (OUTPATIENT)
Age: 43
End: 2023-02-03

## 2023-02-03 PROCEDURE — 21013 EXC FACE TUM DEEP < 2 CM: CPT

## 2023-02-03 PROCEDURE — 14020 TIS TRNFR S/A/L 10 SQ CM/<: CPT

## 2023-02-14 ENCOUNTER — APPOINTMENT (OUTPATIENT)
Dept: PLASTIC SURGERY | Facility: CLINIC | Age: 43
End: 2023-02-14
Payer: COMMERCIAL

## 2023-02-14 VITALS
SYSTOLIC BLOOD PRESSURE: 138 MMHG | BODY MASS INDEX: 47.07 KG/M2 | DIASTOLIC BLOOD PRESSURE: 81 MMHG | HEART RATE: 77 BPM | OXYGEN SATURATION: 96 % | TEMPERATURE: 96.5 F | WEIGHT: 269 LBS | RESPIRATION RATE: 16 BRPM | HEIGHT: 63.5 IN

## 2023-02-14 PROCEDURE — 99024 POSTOP FOLLOW-UP VISIT: CPT

## 2023-02-14 NOTE — ASSESSMENT
[FreeTextEntry1] : 43 yo female s/p multiple scalp mass excisions 2/3/23\par doing well\par monitor for redness, swelling, fever, chills\par she is encouraged to call if there are any changes\par all pt questions answered\par

## 2023-02-14 NOTE — HISTORY OF PRESENT ILLNESS
[FreeTextEntry1] : Ms. CAROLYN ZAPATA  is a 42 year  old female  with a past medical history of HTN, MORENO, who presents to discuss a masses on Her  scalp which have previously been excised and continue to recur.  She  feels the mass has been slowly growing and becoming increasingly more painful.  She  is worried about its etiology and would like to discuss excision. \par \par non smoker\par no anticoagulation or bleeding disorders\par \par s/p multiple scalp mass excisions 2/3/23\par doing well\par pathology pending\par Denies fever, chills, LE pain/edema\par

## 2023-02-21 NOTE — OB PROVIDER H&P - NS_FHRLOC_OBGYN_ALL_OB
Patient states that Dr Erick Dey is not in [facility] but [facility] is per Leo Ocampo at [facility]. Cambridge Dear states [facility] is on insurance card. She would like to see her at future appt's. She needs refills sent to mail order. Patient will call to set up account. Below [employer]

## 2023-03-07 ENCOUNTER — APPOINTMENT (OUTPATIENT)
Dept: PLASTIC SURGERY | Facility: CLINIC | Age: 43
End: 2023-03-07
Payer: COMMERCIAL

## 2023-03-07 VITALS
DIASTOLIC BLOOD PRESSURE: 90 MMHG | BODY MASS INDEX: 46.2 KG/M2 | OXYGEN SATURATION: 97 % | WEIGHT: 264 LBS | TEMPERATURE: 96 F | RESPIRATION RATE: 16 BRPM | SYSTOLIC BLOOD PRESSURE: 137 MMHG | HEIGHT: 63.5 IN | HEART RATE: 75 BPM

## 2023-03-07 PROCEDURE — 99024 POSTOP FOLLOW-UP VISIT: CPT

## 2023-03-07 NOTE — PHYSICAL EXAM
[NI] : Normal [de-identified] : scalp incisions well healed\par no palpable fluid collections or signs of infection

## 2023-03-07 NOTE — HISTORY OF PRESENT ILLNESS
[FreeTextEntry1] : Ms. CAROLYN ZAPATA  is a 42 year  old female  with a past medical history of HTN, MORENO, who presents to discuss a masses on Her  scalp which have previously been excised and continue to recur.  She  feels the mass has been slowly growing and becoming increasingly more painful.  She  is worried about its etiology and would like to discuss excision. \par \par non smoker\par no anticoagulation or bleeding disorders\par \par s/p multiple scalp mass excisions 2/3/23\par doing well\par pathology pilar cysts\par Denies fever, chills, LE pain/edema\par

## 2023-03-07 NOTE — ASSESSMENT
[FreeTextEntry1] : 41 yo female s/p multiple scalp mass excisions 2/3/23\par doing well\par discussed beginning scar massages in 1 week \par discussed applying sun screen\par no restrictions \par all questions answered\par she is encouraged to call if anything changes

## 2023-08-14 NOTE — OB PROVIDER H&P - NSHOSPITALIZATION_OBGYN_ALL_OB
Problem: Discharge Planning  Goal: Discharge to home or other facility with appropriate resources  8/14/2023 0002 by Jose Sahu RN  Outcome: Progressing  8/13/2023 2107 by Jose Sahu RN  Outcome: Progressing     Problem: Skin/Tissue Integrity  Goal: Absence of new skin breakdown  Description: 1. Monitor for areas of redness and/or skin breakdown  2. Assess vascular access sites hourly  3. Every 4-6 hours minimum:  Change oxygen saturation probe site  4. Every 4-6 hours:  If on nasal continuous positive airway pressure, respiratory therapy assess nares and determine need for appliance change or resting period. 8/14/2023 0002 by Jose Sahu RN  Outcome: Progressing  8/13/2023 2107 by Jose Sahu RN  Outcome: Progressing     Problem: Safety - Adult  Goal: Free from fall injury  8/14/2023 0002 by Jose Sahu RN  Outcome: Progressing  8/13/2023 2107 by Jose Sahu RN  Outcome: Progressing     Problem: ABCDS Injury Assessment  Goal: Absence of physical injury  8/14/2023 0002 by Jose Sahu RN  Outcome: Progressing  8/13/2023 2107 by Jose Sahu RN  Outcome: Progressing     Problem: Confusion  Goal: Confusion, delirium, dementia, or psychosis is improved or at baseline  Description: INTERVENTIONS:  1. Assess for possible contributors to thought disturbance, including medications, impaired vision or hearing, underlying metabolic abnormalities, dehydration, psychiatric diagnoses, and notify attending LIP  2. [place] high risk fall precautions, as indicated  3. Provide frequent short contacts to provide reality reorientation, refocusing and direction  4. Decrease environmental stimuli, including noise as appropriate  5. Monitor and intervene to maintain adequate nutrition, hydration, elimination, sleep and activity  6. If unable to ensure safety without constant attention obtain sitter and review sitter guidelines with assigned personnel  7.  Initiate Psychosocial CNS and Spiritual Care consult, as indicated  8/14/2023 0002 by Amy Larry RN  Outcome: Progressing  8/13/2023 2107 by Amy Larry RN  Outcome: Progressing     Problem: Nutrition Deficit:  Goal: Optimize nutritional status  8/14/2023 0002 by Amy Larry RN  Outcome: Progressing  8/13/2023 2107 by Amy Larry RN  Outcome: Progressing No

## 2023-10-06 ENCOUNTER — APPOINTMENT (OUTPATIENT)
Dept: DERMATOLOGY | Facility: CLINIC | Age: 43
End: 2023-10-06
Payer: COMMERCIAL

## 2023-10-06 ENCOUNTER — RESULT REVIEW (OUTPATIENT)
Age: 43
End: 2023-10-06

## 2023-10-06 PROCEDURE — 11102 TANGNTL BX SKIN SINGLE LES: CPT

## 2023-10-06 PROCEDURE — 99203 OFFICE O/P NEW LOW 30 MIN: CPT | Mod: 25

## 2023-10-10 ENCOUNTER — APPOINTMENT (OUTPATIENT)
Dept: PLASTIC SURGERY | Facility: CLINIC | Age: 43
End: 2023-10-10
Payer: COMMERCIAL

## 2023-10-10 VITALS
SYSTOLIC BLOOD PRESSURE: 127 MMHG | HEIGHT: 63.5 IN | BODY MASS INDEX: 47.42 KG/M2 | DIASTOLIC BLOOD PRESSURE: 84 MMHG | OXYGEN SATURATION: 97 % | WEIGHT: 271 LBS | TEMPERATURE: 98 F | RESPIRATION RATE: 16 BRPM | HEART RATE: 84 BPM

## 2023-10-10 PROCEDURE — 99214 OFFICE O/P EST MOD 30 MIN: CPT

## 2023-10-19 ENCOUNTER — NON-APPOINTMENT (OUTPATIENT)
Age: 43
End: 2023-10-19

## 2023-10-20 NOTE — OB PROVIDER DELIVERY SUMMARY - NS_ROMTYPE_OBGYN_ALL_OB
Patient Name: Kathy Elizondo  MRN: [id number]  : 1941  DOS: 10/20/2023    Attending: Olu Del Rio MD    Primary Care Provider: Rashmi Burgess APRN    Date of Admission:.10/19/2023  6:04 AM    Date of Discharge:  10/20/2023    Discharge Diagnosis:   S/P total knee arthroplasty, right    Essential hypertension    Hyperlipidemia LDL goal <100    H/O: stroke     Congenital abnormality of aortic root    Arthritis of knee      Hospital Course    At admit:  Patient is a pleasant 82 y.o. female presented for scheduled surgery by .     Per his note: (Patient is a 82 y.o. female noted for progressively worsening right knee pain over the last several months to year. Patient has reached the point of disability and is having difficulty with activities of daily living including but not limited to getting up out of a chair and ambulating distances and stairs, and complains of night pain. Patient has failed nonoperative management. Treatment options and alternatives were discussed in detail with the patient and indicated for a total knee arthroplasty. Likely risks and benefits of the procedure including but not limited to infection, DVT, pulmonary embolism, future loosening of the implants, possible vengeance injury to tendons, ligaments, nerves, and/or vessels, and periprosthetic fracture have been discussed in detail.  Despite the risks involved, the patient elected to proceed and informed consent was obtained and the patient was scheduled for surgery. )     She underwent right TKA, surgery was done under SA, was tolerated well. Pt was admitted for further management. ACB cath was placed by acute pain service.     Seen in her room postop, no c/o f/cn/vom/sob.   Fair pain control . No hx of DVT or PE, but is on chronic anticoagulation due to thrombus noted on HARRIET.      After admit:    Patient was provided pain medications as needed for pain control, along with adductor canal nerve block infusion of  Ropivacaine.      Adjustments were made to pain medications to optimize postop pain management. Risks and benefits of opiate medications discussed with patient. LULU report was reviewed.    She was seen by PT and OT and has progressed well over her stay.    Pt used an IS for atelectasis prophylaxis and Apixaban along with mechanicals for DVT prophylaxis.    Home medications were resumed as appropriate, and labs were monitored and remained fairly stable.     With the progress she has made,  is ready for DC home today.      She will have an Infupump ( instructed on it during this admit).    Discussed with patient regarding plan and She shows understanding and agreement.    Patient will have PT following discharge.        Procedures Performed  Procedure(s):  TOTAL KNEE ARTHROPLASTY WITH MARINO ROBOT - RIGHT       Pertinent Test Results:    I reviewed the patient's new clinical results.   Results from last 7 days   Lab Units 10/20/23  0515   HEMOGLOBIN g/dL 10.9*   HEMATOCRIT % 34.3     Results from last 7 days   Lab Units 10/20/23  0514   SODIUM mmol/L 138   POTASSIUM mmol/L 4.2   CHLORIDE mmol/L 105   CO2 mmol/L 25.0   BUN mg/dL 25*   CREATININE mg/dL 0.78   CALCIUM mg/dL 9.0   GLUCOSE mg/dL 109*     I reviewed the patient's new imaging including images and reports.      Physical therapy  Date of Service: 10/20/23 0836  Creation Time: 10/20/23 0930     Signed         Goal Outcome Evaluation:  Plan of Care Reviewed With: patient  Progress: improving  Outcome Evaluation: Patient demonstrated good mobility wtih walker. No loss of balance or buckling. She does need reminders to slow down at times. I believe she could go home with family 24/7 support with outpatient PT        Anticipated Discharge Disposition (PT): home with 24/7 care, home with outpatient therapy services                 Discharge Assessment:       Visit Vitals  /67 (BP Location: Left arm, Patient Position: Lying)   Pulse 83   Temp 98.1 °F  "(36.7 °C) (Oral)   Resp 20   Ht 152.4 cm (60\")   Wt 56.7 kg (125 lb)   SpO2 94%   BMI 24.41 kg/m²     Temp (24hrs), Av.5 °F (36.4 °C), Min:96 °F (35.6 °C), Max:98.1 °F (36.7 °C)      General Appearance:    Alert, cooperative, in no acute distress   Lungs:     Clear to auscultation,respirations regular, even and                   unlabored    Heart:    Regular rhythm and normal rate, normal S1 and S2   Abdomen:     Normal bowel sounds, no masses, no organomegaly, soft        non-tender, non-distended, no guarding, no rebound                 tenderness   Extremities:   CDI dressing surgical knee . ACB cath present, infupump.   Pulses:   Pulses palpable and equal bilaterally   Skin:   No bleeding, bruising or rash   Neurologic:   Cranial nerves 2 - 12 grossly intact, sensation intact, Flexion and dorsiflexion intact bilateral feet.         Discharge Disposition: Home    Discharge Medications     Discharge Medications        New Medications        Instructions Start Date   acetaminophen 500 MG tablet  Commonly known as: TYLENOL   1,000 mg, Oral, Every 8 Hours, Take every 8 hours  as needed after 1 week      cefadroxil 500 MG capsule  Commonly known as: DURICEF   500 mg, Oral, 2 Times Daily      docusate sodium 100 MG capsule  Commonly known as: Colace   100 mg, Oral, 2 Times Daily      meloxicam 15 MG tablet  Commonly known as: MOBIC   15 mg, Oral, Daily      ondansetron 4 MG tablet  Commonly known as: Zofran   4 mg, Oral, Every 8 Hours PRN      oxyCODONE 5 MG immediate release tablet  Commonly known as: Roxicodone   5 mg, Oral, Every 4 Hours PRN      traMADol 50 MG tablet  Commonly known as: ULTRAM   50 mg, Oral, Every 6 Hours PRN             Changes to Medications        Instructions Start Date   apixaban 2.5 MG tablet tablet  Commonly known as: ELIQUIS  What changed:   medication strength  how much to take  additional instructions   2.5 mg, Oral, Every 12 Hours Scheduled, Instructed per ARNP hold 3-5 days prior " to surgery      difluprednate 0.05 % ophthalmic emulsion  Commonly known as: DUREZOL  What changed:   how much to take  how to take this  when to take this  reasons to take this   Follow Instructions on AVS             Continue These Medications        Instructions Start Date   amLODIPine 5 MG tablet  Commonly known as: NORVASC   TAKE ONE TABLET BY MOUTH EVERY DAY      losartan 50 MG tablet  Commonly known as: COZAAR   50 mg, Oral, Daily      lovastatin 20 MG tablet  Commonly known as: MEVACOR   20 mg, Oral, Nightly               Discharge Diet: resume prior.     Activity at Discharge:   Weight bearing as tolerated      Follow-up Appointments:  Future Appointments   Date Time Provider Department Center   3/21/2024  9:15 AM Laurent Tomas MD [facility] CD  [facility]     Orthopedic surgery per 's orders.    Shanda disclaimer:  Part of this encounter note is an electronic transcription/translation of spoken language to printed text. The electronic translation of spoken language may permit erroneous, or at times, nonsensical words or phrases to be inadvertently transcribed; Although I have reviewed the note for such errors, some may still exist.       Dany Lobato MD  10/20/23  13:20 EDT             [facility]

## 2023-10-27 ENCOUNTER — APPOINTMENT (OUTPATIENT)
Dept: OBGYN | Facility: CLINIC | Age: 43
End: 2023-10-27
Payer: COMMERCIAL

## 2023-10-27 VITALS
HEIGHT: 63 IN | DIASTOLIC BLOOD PRESSURE: 97 MMHG | WEIGHT: 271 LBS | SYSTOLIC BLOOD PRESSURE: 145 MMHG | BODY MASS INDEX: 48.02 KG/M2

## 2023-10-27 DIAGNOSIS — Z01.419 ENCOUNTER FOR GYNECOLOGICAL EXAMINATION (GENERAL) (ROUTINE) W/OUT ABNORMAL FINDINGS: ICD-10-CM

## 2023-10-27 DIAGNOSIS — Z86.19 PERSONAL HISTORY OF OTHER INFECTIOUS AND PARASITIC DISEASES: ICD-10-CM

## 2023-10-27 PROCEDURE — 99396 PREV VISIT EST AGE 40-64: CPT

## 2023-10-27 PROCEDURE — 99214 OFFICE O/P EST MOD 30 MIN: CPT | Mod: 25

## 2023-10-28 LAB — HPV HIGH+LOW RISK DNA PNL CVX: NOT DETECTED

## 2023-11-06 LAB
APTT BLD: 31.2 SEC
BASOPHILS # BLD AUTO: 0.04 K/UL
BASOPHILS NFR BLD AUTO: 0.6 %
CHOLEST SERPL-MCNC: 192 MG/DL
CYTOLOGY CVX/VAG DOC THIN PREP: NORMAL
EOSINOPHIL # BLD AUTO: 0.21 K/UL
EOSINOPHIL NFR BLD AUTO: 3.3 %
ESTIMATED AVERAGE GLUCOSE: 209 MG/DL
HBA1C MFR BLD HPLC: 8.9 %
HCG SERPL-MCNC: <1 MIU/ML
HCT VFR BLD CALC: 40.4 %
HDLC SERPL-MCNC: 51 MG/DL
HGB BLD-MCNC: 13.5 G/DL
IMM GRANULOCYTES NFR BLD AUTO: 0.2 %
INR PPP: 0.94 RATIO
LDLC SERPL CALC-MCNC: 122 MG/DL
LYMPHOCYTES # BLD AUTO: 1.7 K/UL
LYMPHOCYTES NFR BLD AUTO: 26.9 %
MAN DIFF?: NORMAL
MCHC RBC-ENTMCNC: 29.9 PG
MCHC RBC-ENTMCNC: 33.4 GM/DL
MCV RBC AUTO: 89.4 FL
MONOCYTES # BLD AUTO: 0.45 K/UL
MONOCYTES NFR BLD AUTO: 7.1 %
NEUTROPHILS # BLD AUTO: 3.92 K/UL
NEUTROPHILS NFR BLD AUTO: 61.9 %
NONHDLC SERPL-MCNC: 141 MG/DL
PLATELET # BLD AUTO: 228 K/UL
PROLACTIN SERPL-MCNC: 66 NG/ML
PT BLD: 10.6 SEC
RBC # BLD: 4.52 M/UL
RBC # FLD: 13 %
TRIGL SERPL-MCNC: 109 MG/DL
TSH SERPL-ACNC: 0.86 UIU/ML
WBC # FLD AUTO: 6.33 K/UL

## 2023-11-09 RX ORDER — OXYCODONE 5 MG/1
5 TABLET ORAL
Qty: 10 | Refills: 0 | Status: ACTIVE | COMMUNITY
Start: 2023-11-09 | End: 1900-01-01

## 2023-11-17 ENCOUNTER — RESULT REVIEW (OUTPATIENT)
Age: 43
End: 2023-11-17

## 2023-11-17 ENCOUNTER — APPOINTMENT (OUTPATIENT)
Dept: PLASTIC SURGERY | Facility: AMBULATORY SURGERY CENTER | Age: 43
End: 2023-11-17
Payer: COMMERCIAL

## 2023-11-17 PROCEDURE — 21013 EXC FACE TUM DEEP < 2 CM: CPT

## 2023-11-17 PROCEDURE — 14020 TIS TRNFR S/A/L 10 SQ CM/<: CPT

## 2023-11-27 ENCOUNTER — APPOINTMENT (OUTPATIENT)
Dept: OBGYN | Facility: CLINIC | Age: 43
End: 2023-11-27

## 2023-11-27 ENCOUNTER — APPOINTMENT (OUTPATIENT)
Dept: ANTEPARTUM | Facility: CLINIC | Age: 43
End: 2023-11-27

## 2023-12-05 ENCOUNTER — APPOINTMENT (OUTPATIENT)
Dept: PLASTIC SURGERY | Facility: CLINIC | Age: 43
End: 2023-12-05
Payer: COMMERCIAL

## 2023-12-05 VITALS
DIASTOLIC BLOOD PRESSURE: 88 MMHG | HEART RATE: 85 BPM | WEIGHT: 269 LBS | BODY MASS INDEX: 47.66 KG/M2 | TEMPERATURE: 97.9 F | RESPIRATION RATE: 14 BRPM | SYSTOLIC BLOOD PRESSURE: 128 MMHG | HEIGHT: 63 IN | OXYGEN SATURATION: 98 %

## 2023-12-05 DIAGNOSIS — R22.0 LOCALIZED SWELLING, MASS AND LUMP, HEAD: ICD-10-CM

## 2023-12-05 PROCEDURE — 99024 POSTOP FOLLOW-UP VISIT: CPT

## 2023-12-06 ENCOUNTER — APPOINTMENT (OUTPATIENT)
Dept: DERMATOLOGY | Facility: CLINIC | Age: 43
End: 2023-12-06

## 2023-12-10 ENCOUNTER — NON-APPOINTMENT (OUTPATIENT)
Age: 43
End: 2023-12-10

## 2023-12-30 ENCOUNTER — NON-APPOINTMENT (OUTPATIENT)
Age: 43
End: 2023-12-30

## 2024-01-09 ENCOUNTER — APPOINTMENT (OUTPATIENT)
Dept: PLASTIC SURGERY | Facility: CLINIC | Age: 44
End: 2024-01-09

## 2024-01-20 ENCOUNTER — INPATIENT (INPATIENT)
Facility: HOSPITAL | Age: 44
LOS: 0 days | Discharge: HOME | DRG: 419 | End: 2024-01-21
Attending: SURGERY | Admitting: SURGERY
Payer: COMMERCIAL

## 2024-01-20 ENCOUNTER — TRANSCRIPTION ENCOUNTER (OUTPATIENT)
Age: 44
End: 2024-01-20

## 2024-01-20 VITALS
HEART RATE: 95 BPM | OXYGEN SATURATION: 98 % | WEIGHT: 261.03 LBS | DIASTOLIC BLOOD PRESSURE: 94 MMHG | SYSTOLIC BLOOD PRESSURE: 166 MMHG | HEIGHT: 66 IN | RESPIRATION RATE: 18 BRPM | TEMPERATURE: 99 F

## 2024-01-20 DIAGNOSIS — Z98.890 OTHER SPECIFIED POSTPROCEDURAL STATES: Chronic | ICD-10-CM

## 2024-01-20 DIAGNOSIS — Z98.891 HISTORY OF UTERINE SCAR FROM PREVIOUS SURGERY: Chronic | ICD-10-CM

## 2024-01-20 DIAGNOSIS — K80.20 CALCULUS OF GALLBLADDER WITHOUT CHOLECYSTITIS WITHOUT OBSTRUCTION: ICD-10-CM

## 2024-01-20 LAB
ALBUMIN SERPL ELPH-MCNC: 4.2 G/DL — SIGNIFICANT CHANGE UP (ref 3.3–5.2)
ALLERGY+IMMUNOLOGY DIAG STUDY NOTE: SIGNIFICANT CHANGE UP
ALP SERPL-CCNC: 84 U/L — SIGNIFICANT CHANGE UP (ref 40–120)
ALT FLD-CCNC: 12 U/L — SIGNIFICANT CHANGE UP
ANION GAP SERPL CALC-SCNC: 12 MMOL/L — SIGNIFICANT CHANGE UP (ref 5–17)
ANION GAP SERPL CALC-SCNC: 14 MMOL/L — SIGNIFICANT CHANGE UP (ref 5–17)
APPEARANCE UR: CLEAR — SIGNIFICANT CHANGE UP
AST SERPL-CCNC: 14 U/L — SIGNIFICANT CHANGE UP
BASOPHILS # BLD AUTO: 0.03 K/UL — SIGNIFICANT CHANGE UP (ref 0–0.2)
BASOPHILS NFR BLD AUTO: 0.4 % — SIGNIFICANT CHANGE UP (ref 0–2)
BILIRUB SERPL-MCNC: 0.4 MG/DL — SIGNIFICANT CHANGE UP (ref 0.4–2)
BILIRUB UR-MCNC: NEGATIVE — SIGNIFICANT CHANGE UP
BLD GP AB SCN SERPL QL: SIGNIFICANT CHANGE UP
BUN SERPL-MCNC: 11.7 MG/DL — SIGNIFICANT CHANGE UP (ref 8–20)
BUN SERPL-MCNC: 9.1 MG/DL — SIGNIFICANT CHANGE UP (ref 8–20)
CALCIUM SERPL-MCNC: 8.5 MG/DL — SIGNIFICANT CHANGE UP (ref 8.4–10.5)
CALCIUM SERPL-MCNC: 9.3 MG/DL — SIGNIFICANT CHANGE UP (ref 8.4–10.5)
CHLORIDE SERPL-SCNC: 103 MMOL/L — SIGNIFICANT CHANGE UP (ref 96–108)
CHLORIDE SERPL-SCNC: 103 MMOL/L — SIGNIFICANT CHANGE UP (ref 96–108)
CO2 SERPL-SCNC: 23 MMOL/L — SIGNIFICANT CHANGE UP (ref 22–29)
CO2 SERPL-SCNC: 23 MMOL/L — SIGNIFICANT CHANGE UP (ref 22–29)
COLOR SPEC: SIGNIFICANT CHANGE UP
CREAT SERPL-MCNC: 0.53 MG/DL — SIGNIFICANT CHANGE UP (ref 0.5–1.3)
CREAT SERPL-MCNC: 0.55 MG/DL — SIGNIFICANT CHANGE UP (ref 0.5–1.3)
DIFF PNL FLD: NEGATIVE — SIGNIFICANT CHANGE UP
DIR ANTIGLOB POLYSPECIFIC INTERPRETATION: SIGNIFICANT CHANGE UP
EGFR: 117 ML/MIN/1.73M2 — SIGNIFICANT CHANGE UP
EGFR: 118 ML/MIN/1.73M2 — SIGNIFICANT CHANGE UP
EOSINOPHIL # BLD AUTO: 0.06 K/UL — SIGNIFICANT CHANGE UP (ref 0–0.5)
EOSINOPHIL NFR BLD AUTO: 0.8 % — SIGNIFICANT CHANGE UP (ref 0–6)
GLUCOSE BLDC GLUCOMTR-MCNC: 101 MG/DL — HIGH (ref 70–99)
GLUCOSE SERPL-MCNC: 113 MG/DL — HIGH (ref 70–99)
GLUCOSE SERPL-MCNC: 82 MG/DL — SIGNIFICANT CHANGE UP (ref 70–99)
GLUCOSE UR QL: NEGATIVE MG/DL — SIGNIFICANT CHANGE UP
HCG SERPL-ACNC: <4 MIU/ML — SIGNIFICANT CHANGE UP
HCT VFR BLD CALC: 40.6 % — SIGNIFICANT CHANGE UP (ref 34.5–45)
HGB BLD-MCNC: 13.6 G/DL — SIGNIFICANT CHANGE UP (ref 11.5–15.5)
IMM GRANULOCYTES NFR BLD AUTO: 0.1 % — SIGNIFICANT CHANGE UP (ref 0–0.9)
KETONES UR-MCNC: NEGATIVE MG/DL — SIGNIFICANT CHANGE UP
LACTATE BLDV-MCNC: 2.2 MMOL/L — HIGH (ref 0.5–2)
LEUKOCYTE ESTERASE UR-ACNC: NEGATIVE — SIGNIFICANT CHANGE UP
LIDOCAIN IGE QN: 16 U/L — LOW (ref 22–51)
LYMPHOCYTES # BLD AUTO: 2.06 K/UL — SIGNIFICANT CHANGE UP (ref 1–3.3)
LYMPHOCYTES # BLD AUTO: 27.2 % — SIGNIFICANT CHANGE UP (ref 13–44)
MAGNESIUM SERPL-MCNC: 2 MG/DL — SIGNIFICANT CHANGE UP (ref 1.6–2.6)
MCHC RBC-ENTMCNC: 29.1 PG — SIGNIFICANT CHANGE UP (ref 27–34)
MCHC RBC-ENTMCNC: 33.5 GM/DL — SIGNIFICANT CHANGE UP (ref 32–36)
MCV RBC AUTO: 86.8 FL — SIGNIFICANT CHANGE UP (ref 80–100)
MONOCYTES # BLD AUTO: 0.43 K/UL — SIGNIFICANT CHANGE UP (ref 0–0.9)
MONOCYTES NFR BLD AUTO: 5.7 % — SIGNIFICANT CHANGE UP (ref 2–14)
NEUTROPHILS # BLD AUTO: 4.98 K/UL — SIGNIFICANT CHANGE UP (ref 1.8–7.4)
NEUTROPHILS NFR BLD AUTO: 65.8 % — SIGNIFICANT CHANGE UP (ref 43–77)
NITRITE UR-MCNC: NEGATIVE — SIGNIFICANT CHANGE UP
PH UR: 6 — SIGNIFICANT CHANGE UP (ref 5–8)
PHOSPHATE SERPL-MCNC: 2.8 MG/DL — SIGNIFICANT CHANGE UP (ref 2.4–4.7)
PLATELET # BLD AUTO: 309 K/UL — SIGNIFICANT CHANGE UP (ref 150–400)
POTASSIUM SERPL-MCNC: 3.9 MMOL/L — SIGNIFICANT CHANGE UP (ref 3.5–5.3)
POTASSIUM SERPL-MCNC: 4.1 MMOL/L — SIGNIFICANT CHANGE UP (ref 3.5–5.3)
POTASSIUM SERPL-SCNC: 3.9 MMOL/L — SIGNIFICANT CHANGE UP (ref 3.5–5.3)
POTASSIUM SERPL-SCNC: 4.1 MMOL/L — SIGNIFICANT CHANGE UP (ref 3.5–5.3)
PROT SERPL-MCNC: 7.6 G/DL — SIGNIFICANT CHANGE UP (ref 6.6–8.7)
PROT UR-MCNC: SIGNIFICANT CHANGE UP MG/DL
RBC # BLD: 4.68 M/UL — SIGNIFICANT CHANGE UP (ref 3.8–5.2)
RBC # FLD: 13 % — SIGNIFICANT CHANGE UP (ref 10.3–14.5)
SODIUM SERPL-SCNC: 138 MMOL/L — SIGNIFICANT CHANGE UP (ref 135–145)
SODIUM SERPL-SCNC: 140 MMOL/L — SIGNIFICANT CHANGE UP (ref 135–145)
SP GR SPEC: 1.03 — SIGNIFICANT CHANGE UP (ref 1–1.03)
UROBILINOGEN FLD QL: 1 MG/DL — SIGNIFICANT CHANGE UP (ref 0.2–1)
WBC # BLD: 7.57 K/UL — SIGNIFICANT CHANGE UP (ref 3.8–10.5)
WBC # FLD AUTO: 7.57 K/UL — SIGNIFICANT CHANGE UP (ref 3.8–10.5)

## 2024-01-20 PROCEDURE — 74177 CT ABD & PELVIS W/CONTRAST: CPT | Mod: 26,MG

## 2024-01-20 PROCEDURE — 93010 ELECTROCARDIOGRAM REPORT: CPT

## 2024-01-20 PROCEDURE — 76705 ECHO EXAM OF ABDOMEN: CPT | Mod: 26

## 2024-01-20 PROCEDURE — 86077 PHYS BLOOD BANK SERV XMATCH: CPT

## 2024-01-20 PROCEDURE — G1004: CPT

## 2024-01-20 PROCEDURE — 99285 EMERGENCY DEPT VISIT HI MDM: CPT

## 2024-01-20 RX ORDER — INSULIN DETEMIR 100/ML (3)
50 INSULIN PEN (ML) SUBCUTANEOUS
Qty: 0 | Refills: 0 | DISCHARGE

## 2024-01-20 RX ORDER — CIPROFLOXACIN LACTATE 400MG/40ML
VIAL (ML) INTRAVENOUS
Refills: 0 | Status: DISCONTINUED | OUTPATIENT
Start: 2024-01-20 | End: 2024-01-21

## 2024-01-20 RX ORDER — METRONIDAZOLE 500 MG
500 TABLET ORAL ONCE
Refills: 0 | Status: COMPLETED | OUTPATIENT
Start: 2024-01-20 | End: 2024-01-20

## 2024-01-20 RX ORDER — DEXTROSE 50 % IN WATER 50 %
25 SYRINGE (ML) INTRAVENOUS ONCE
Refills: 0 | Status: DISCONTINUED | OUTPATIENT
Start: 2024-01-20 | End: 2024-01-21

## 2024-01-20 RX ORDER — MORPHINE SULFATE 50 MG/1
4 CAPSULE, EXTENDED RELEASE ORAL ONCE
Refills: 0 | Status: DISCONTINUED | OUTPATIENT
Start: 2024-01-20 | End: 2024-01-20

## 2024-01-20 RX ORDER — SODIUM CHLORIDE 9 MG/ML
1000 INJECTION, SOLUTION INTRAVENOUS
Refills: 0 | Status: DISCONTINUED | OUTPATIENT
Start: 2024-01-20 | End: 2024-01-21

## 2024-01-20 RX ORDER — INSULIN LISPRO 100/ML
VIAL (ML) SUBCUTANEOUS
Refills: 0 | Status: DISCONTINUED | OUTPATIENT
Start: 2024-01-20 | End: 2024-01-21

## 2024-01-20 RX ORDER — DEXTROSE 50 % IN WATER 50 %
12.5 SYRINGE (ML) INTRAVENOUS ONCE
Refills: 0 | Status: DISCONTINUED | OUTPATIENT
Start: 2024-01-20 | End: 2024-01-21

## 2024-01-20 RX ORDER — FAMOTIDINE 10 MG/ML
20 INJECTION INTRAVENOUS AT BEDTIME
Refills: 0 | Status: DISCONTINUED | OUTPATIENT
Start: 2024-01-20 | End: 2024-01-21

## 2024-01-20 RX ORDER — ACETAMINOPHEN 500 MG
1000 TABLET ORAL ONCE
Refills: 0 | Status: COMPLETED | OUTPATIENT
Start: 2024-01-20 | End: 2024-01-20

## 2024-01-20 RX ORDER — FAMOTIDINE 10 MG/ML
20 INJECTION INTRAVENOUS ONCE
Refills: 0 | Status: COMPLETED | OUTPATIENT
Start: 2024-01-20 | End: 2024-01-20

## 2024-01-20 RX ORDER — CHLORHEXIDINE GLUCONATE 213 G/1000ML
1 SOLUTION TOPICAL EVERY 12 HOURS
Refills: 0 | Status: COMPLETED | OUTPATIENT
Start: 2024-01-20 | End: 2024-01-21

## 2024-01-20 RX ORDER — GLUCAGON INJECTION, SOLUTION 0.5 MG/.1ML
1 INJECTION, SOLUTION SUBCUTANEOUS ONCE
Refills: 0 | Status: DISCONTINUED | OUTPATIENT
Start: 2024-01-20 | End: 2024-01-21

## 2024-01-20 RX ORDER — ONDANSETRON 8 MG/1
8 TABLET, FILM COATED ORAL ONCE
Refills: 0 | Status: COMPLETED | OUTPATIENT
Start: 2024-01-20 | End: 2024-01-20

## 2024-01-20 RX ORDER — PANTOPRAZOLE SODIUM 20 MG/1
40 TABLET, DELAYED RELEASE ORAL
Refills: 0 | Status: DISCONTINUED | OUTPATIENT
Start: 2024-01-20 | End: 2024-01-21

## 2024-01-20 RX ORDER — ONDANSETRON 8 MG/1
4 TABLET, FILM COATED ORAL EVERY 6 HOURS
Refills: 0 | Status: DISCONTINUED | OUTPATIENT
Start: 2024-01-20 | End: 2024-01-21

## 2024-01-20 RX ORDER — METRONIDAZOLE 500 MG
500 TABLET ORAL EVERY 8 HOURS
Refills: 0 | Status: DISCONTINUED | OUTPATIENT
Start: 2024-01-21 | End: 2024-01-21

## 2024-01-20 RX ORDER — RANITIDINE HYDROCHLORIDE 150 MG/1
1 TABLET, FILM COATED ORAL
Qty: 0 | Refills: 0 | DISCHARGE

## 2024-01-20 RX ORDER — INFLUENZA VIRUS VACCINE 15; 15; 15; 15 UG/.5ML; UG/.5ML; UG/.5ML; UG/.5ML
0.5 SUSPENSION INTRAMUSCULAR ONCE
Refills: 0 | Status: DISCONTINUED | OUTPATIENT
Start: 2024-01-20 | End: 2024-01-21

## 2024-01-20 RX ORDER — SODIUM CHLORIDE 9 MG/ML
1000 INJECTION INTRAMUSCULAR; INTRAVENOUS; SUBCUTANEOUS ONCE
Refills: 0 | Status: COMPLETED | OUTPATIENT
Start: 2024-01-20 | End: 2024-01-20

## 2024-01-20 RX ORDER — DEXTROSE 50 % IN WATER 50 %
15 SYRINGE (ML) INTRAVENOUS ONCE
Refills: 0 | Status: DISCONTINUED | OUTPATIENT
Start: 2024-01-20 | End: 2024-01-21

## 2024-01-20 RX ORDER — CIPROFLOXACIN LACTATE 400MG/40ML
400 VIAL (ML) INTRAVENOUS ONCE
Refills: 0 | Status: COMPLETED | OUTPATIENT
Start: 2024-01-20 | End: 2024-01-20

## 2024-01-20 RX ORDER — CIPROFLOXACIN LACTATE 400MG/40ML
400 VIAL (ML) INTRAVENOUS EVERY 12 HOURS
Refills: 0 | Status: DISCONTINUED | OUTPATIENT
Start: 2024-01-21 | End: 2024-01-21

## 2024-01-20 RX ORDER — SODIUM CHLORIDE 9 MG/ML
1000 INJECTION, SOLUTION INTRAVENOUS
Refills: 0 | Status: COMPLETED | OUTPATIENT
Start: 2024-01-20 | End: 2024-01-20

## 2024-01-20 RX ORDER — METRONIDAZOLE 500 MG
TABLET ORAL
Refills: 0 | Status: DISCONTINUED | OUTPATIENT
Start: 2024-01-20 | End: 2024-01-21

## 2024-01-20 RX ORDER — HEPARIN SODIUM 5000 [USP'U]/ML
5000 INJECTION INTRAVENOUS; SUBCUTANEOUS EVERY 8 HOURS
Refills: 0 | Status: DISCONTINUED | OUTPATIENT
Start: 2024-01-20 | End: 2024-01-21

## 2024-01-20 RX ADMIN — Medication 200 MILLIGRAM(S): at 20:29

## 2024-01-20 RX ADMIN — FAMOTIDINE 20 MILLIGRAM(S): 10 INJECTION INTRAVENOUS at 14:19

## 2024-01-20 RX ADMIN — Medication 1000 MILLIGRAM(S): at 14:25

## 2024-01-20 RX ADMIN — ONDANSETRON 8 MILLIGRAM(S): 8 TABLET, FILM COATED ORAL at 14:19

## 2024-01-20 RX ADMIN — SODIUM CHLORIDE 200 MILLILITER(S): 9 INJECTION, SOLUTION INTRAVENOUS at 14:37

## 2024-01-20 RX ADMIN — ONDANSETRON 4 MILLIGRAM(S): 8 TABLET, FILM COATED ORAL at 20:29

## 2024-01-20 RX ADMIN — SODIUM CHLORIDE 1000 MILLILITER(S): 9 INJECTION INTRAMUSCULAR; INTRAVENOUS; SUBCUTANEOUS at 14:54

## 2024-01-20 RX ADMIN — Medication 100 MILLIGRAM(S): at 18:42

## 2024-01-20 RX ADMIN — SODIUM CHLORIDE 150 MILLILITER(S): 9 INJECTION, SOLUTION INTRAVENOUS at 22:05

## 2024-01-20 RX ADMIN — HEPARIN SODIUM 5000 UNIT(S): 5000 INJECTION INTRAVENOUS; SUBCUTANEOUS at 22:34

## 2024-01-20 RX ADMIN — Medication 400 MILLIGRAM(S): at 14:19

## 2024-01-20 RX ADMIN — FAMOTIDINE 20 MILLIGRAM(S): 10 INJECTION INTRAVENOUS at 22:34

## 2024-01-20 NOTE — H&P ADULT - ASSESSMENT
43F presenting with several months of RUQ pain, worsening over the past week. Poor PO tolerance, associated N/V/diarrhea. RUQ US with contracted GB and stones, though no acute cholecystitis. Likely bad symptomatic cholelithiasis. Will admit.    Plan:  -admit to surgery - Dr. Carney  -ok for CLD until midnight  -CT abd/pelv  -abx  -lap cholecystectomy tomorrow  -DVT ppx

## 2024-01-20 NOTE — ED PROVIDER NOTE - OBJECTIVE STATEMENT
Pertinent PMH/PSH/FHx/SHx and Review of Systems contained within:  Patient presents to the ED for RUQ pain with vomiting.  Pain and sx have been worsening over the past 6-8 months.  PMH of GERD and IDDM2.  PSH of csection x2.  Patient has been unable to tolerate PO due to pain and vomiting.  FS went to 40s yesterday and is trying to balance insulin needs with decreased PO intake.  no trauma.  Otherwise baseline.  Wants to have surgery of gallbaldder is the issue.  Non toxic.  Well appearing.  No fever/chills, No chest pain/palpitations, no SOB/cough/wheeze/stridor, No lower abdominal pain,  No neck/back pain, no rash, no changes in neurological status/function.

## 2024-01-20 NOTE — H&P ADULT - NSHPLABSRESULTS_GEN_ALL_CORE
13.6   7.57  )-----------( 309      ( 20 Jan 2024 14:03 )             40.6     01-20    140  |  103  |  11.7  ----------------------------<  113<H>  4.1   |  23.0  |  0.55    Ca    9.3      20 Jan 2024 14:03    TPro  7.6  /  Alb  4.2  /  TBili  0.4  /  DBili  x   /  AST  14  /  ALT  12  /  AlkPhos  84  01-20

## 2024-01-20 NOTE — ED ADULT NURSE NOTE - PRO INTERPRETER NEED 2
SLP Plan of Care Note  Swallow Treatment      Recommendations:  Solid Diet Recommendations: Dysphagia 3/easy chew  Liquid Diet Recommendations: Thin liquids  Recommended Form of Meds: Whole with liquid (one at a time)  Follow-up Needed: Swallow therapy (comm/cogn eval)  Therapy Recommendations: Assess tolerance of least restrictive diet, Patient/family education  Feeding Guidelines: Other (comment), Eat slowly, only when alert, Sit fully upright for all PO intake, Constant supervision, Sit up for 30 minutes after meal, Small bites/sips, Periodic liquid wash (single sips at a time, assist with feeding)    Recommendations discussed with RN who was informed of results of session.    Assessment:  Patient was seen on the 7th floor for swallow treatment.  Pt stating she was not hungry and refusing solids.  She was seen with thin liquids and with the administration of pills by RN.      Pt with suspected premature spillage with thin liquids with double-triple swallow per bolus but without overt clinical s/s of aspiration.  Pt able to take one whole pill at a time with liquid wash without difficulty.    Pt to continue on easy chew diet with thin liquids.  CONSTANT supervision.      Continued skilled therapy is indicated for ongoing assessment of po tolerance and to adjust consistencies as needed in order to ensure safe swallow function.    Diagnosis: CONGESTIVE HEART FAILURE (CHF)    Prior Function:  Feeds Self: Yes  Liquids: Thin  Solids : General  Previous Video Swallow Studies/Interventions: None in EPIC       See SLP Flowsheet for goals and objective information addressed in this session.    Plan for Next Session:  Plan for Next Session: assess diet tolerance - upgraded to EZ chew and thin, assess comm/cog    Frequency:  Frequency: M-F (SW & CC EVAL) MD comm/cog eval if appropriate next session, assess diet tolerance (3/4)    Education:  See SLP Flowsheet    Barriers to Discharge:   SLP Identified Barriers to Discharge:  cognition    Recommendations for Discharge:  Recommendations for Discharge: SLP: less intense rehab (03/04/18 1004)     Swallow Treatment Data Overview Last 24 hours     Subjective  Subjective Comments: Pt lethargic and refusing solids. (03/04/18 1004)    Observation  Observation Comments: RNPuja, present (03/04/18 1004)                                                                          Therapeutic Feeding  Liquids: Thin liquids (03/04/18 1004)  Solids: Other (comment) (Pills) (03/04/18 1004)    Thin Liquids  Quantity: 5 oz (03/04/18 1004)  Presentation: Straw (03/04/18 1004)  Oral Phase: Premature spillage suspected;Impaired bolus control (03/04/18 1004)  Pharyngeal Phase: Delayed swallow;Multiple swallows noted (03/04/18 1004)  Comments: double-triple swallow.  No overt clinical s/s of aspiration (03/04/18 1004)                                                 Other Solid  Quantity: whole pills with liquid wash (03/04/18 1004)  Oral Phase: Intact (03/04/18 1004)  Pharyngeal Phase: Delayed swallow;Multiple swallows noted (03/04/18 1004)  Comments: no overt s/s of [id number] (03/04/18 1004)                             Intervention Comments  Intervention Comments: comm/cogn eval when appropriate (03/04/18 1004)              Intervention Comments  Intervention Comments: comm/cogn eval when appropriate (03/04/18 1004)      All Speech Therapy Goals:    SLP Goals  Short Term Goals to be Reviewed on : 03/06/18 (02/27/18 1430)  STG are the Same as Discharge Goals: Yes (02/27/18 1430)  Goal Agreement: Family/significant other/caregiver agrees;Patient unable to agree with goals and treatment plan (02/27/18 1430)                                                                     Swallowing Short Term Goal 1  Swallowing STG 1: Pt will tolerate least restrictive diet with <10% ss aspiration (02/27/18 1430)                                                      SLP Time Spent: 25 min (03/04/18 1004)           English

## 2024-01-20 NOTE — PATIENT PROFILE ADULT - NSPROGENSOURCEINFO_GEN_A_NUR
Detail Level: Simple Price (Do Not Change): 0.00 Instructions: This plan will send the code FBSE to the PM system.  DO NOT or CHANGE the price. patient

## 2024-01-20 NOTE — ED ADULT NURSE NOTE - NS ED NOTE ABUSE SUSPICION NEGLECT YN
Health Maintenance Summary     Topic Due On Due Status Completed On    Pap Smear - Cervical Cancer Screening  Dec 1, 2020 Not Due Dec 1, 2015    Immunization - TDAP Pregnancy  Hidden     IMMUNIZATION - DTaP/Tdap/Td Jul 10, 2022 Not Due Jul 10, 2012    Immunization-Influenza  Completed Oct 4, 2016          Patient is up to date, no discussion needed .       No

## 2024-01-20 NOTE — H&P ADULT - NSHPREVIEWOFSYSTEMS_GEN_ALL_CORE
REVIEW OF SYSTEMS:    CONSTITUTIONAL:  No weakness, fevers or chills  EYES/ENT:  No visual changes;  No vertigo or throat pain   NECK:  No pain or stiffness  RESPIRATORY:  No cough, wheezing, hemoptysis; No shortness of breath  CARDIOVASCULAR:  No chest pain or palpitations  GASTROINTESTINAL:  RUQ abdominal pain. +nausea and vomiting; +diarrhea. No melena or hematochezia.  GENITOURINARY:  No dysuria, frequency or hematuria  MUSCULOSKELETAL:  FROM all extremities, normal strength, No calf tenderness  NEUROLOGICAL:  No numbness or weakness  SKIN:  No itching, rashes

## 2024-01-20 NOTE — H&P ADULT - NSHPPHYSICALEXAM_GEN_ALL_CORE
Constitutional: NAD  HEENT: PERRLA, EOMI, no drainage or redness  Respiratory: respirations even, unlabored on room air  Cardiovascular: RRR  Gastrointestinal: obese, non-tender, non-distended  Neurological: A&O x 3; no gross deficits  Psychiatric: Normal mood, normal affect  Musculoskeletal: No joint pain, swelling or deformity; no limitation of movement  Skin: No rashes

## 2024-01-20 NOTE — ED ADULT NURSE NOTE - OBJECTIVE STATEMENT
Pt a&ox4, rr even and unlabored. Pt complaining of N/V, RUQ x8 months that is usually intermittent. Saw PCP x 3 days ago who referred her to go to the ER. Pain has been constant since last night, not tolerating PO. PMHx of IDDM2, asthma and GERD. Safety maintained and medications given per orders.

## 2024-01-20 NOTE — PATIENT PROFILE ADULT - FALL HARM RISK - UNIVERSAL INTERVENTIONS
Bed in lowest position, wheels locked, appropriate side rails in place/Call bell, personal items and telephone in reach/Instruct patient to call for assistance before getting out of bed or chair/Non-slip footwear when patient is out of bed/Shiro to call system/Physically safe environment - no spills, clutter or unnecessary equipment/Purposeful Proactive Rounding/Room/bathroom lighting operational, light cord in reach

## 2024-01-20 NOTE — H&P ADULT - HISTORY OF PRESENT ILLNESS
43F with PMH DM2 and GERD presents to ER with complaints of persistent RUQ pain. Patient notes pain first presented 8 months ago and has been constantly post-prandial in nature. Approximately 1 week ago, RUQ pain became persistent, worsening the night before presentation. She has had nausea, vomiting, and diarrhea associated with the pain. Denies CP/SOB, no subjective fevers or chills.

## 2024-01-20 NOTE — PATIENT PROFILE ADULT - FUNCTIONAL ASSESSMENT - BASIC MOBILITY 3.
unchanged unchanged unchanged unchanged unchanged unchanged unchanged unchanged unchanged unchanged unchanged 4 = No assist / stand by assistance

## 2024-01-20 NOTE — ED PROVIDER NOTE - CLINICAL SUMMARY MEDICAL DECISION MAKING FREE TEXT BOX
42 y/o F with hx of gallstones c/o RUQ pain with nausea/vomiting - labs, US gallbladder, surgery consult, pain meds

## 2024-01-20 NOTE — ED PROVIDER NOTE - PHYSICAL EXAMINATION
Gen: Alert, NAD  Head: NC, AT, PERRL, EOMI, normal lids/conjunctiva  ENT: normal hearing, patent oropharynx   Neck: +supple, no tenderness/meningismus/JVD, +Trachea midline  Pulm: Bilateral BS, normal resp effort, no wheeze/stridor/retractions  CV: RRR, no R/G, +dist pulses  Abd: soft, RUQ TTP, +Barrera, lower is NT/ND, +BS,  Mskel: no edema/erythema/cyanosis  Skin: no rash  Neuro: AAOx3, no gross sensory/motor deficits

## 2024-01-21 ENCOUNTER — TRANSCRIPTION ENCOUNTER (OUTPATIENT)
Age: 44
End: 2024-01-21

## 2024-01-21 VITALS
DIASTOLIC BLOOD PRESSURE: 90 MMHG | TEMPERATURE: 98 F | HEART RATE: 89 BPM | RESPIRATION RATE: 18 BRPM | SYSTOLIC BLOOD PRESSURE: 148 MMHG | OXYGEN SATURATION: 96 %

## 2024-01-21 LAB
A1C WITH ESTIMATED AVERAGE GLUCOSE RESULT: 8.8 % — HIGH (ref 4–5.6)
ESTIMATED AVERAGE GLUCOSE: 206 MG/DL — HIGH (ref 68–114)
GLUCOSE BLDC GLUCOMTR-MCNC: 103 MG/DL — HIGH (ref 70–99)
GLUCOSE BLDC GLUCOMTR-MCNC: 124 MG/DL — HIGH (ref 70–99)
GLUCOSE BLDC GLUCOMTR-MCNC: 79 MG/DL — SIGNIFICANT CHANGE UP (ref 70–99)
HCT VFR BLD CALC: 38.6 % — SIGNIFICANT CHANGE UP (ref 34.5–45)
HGB BLD-MCNC: 12.1 G/DL — SIGNIFICANT CHANGE UP (ref 11.5–15.5)
MCHC RBC-ENTMCNC: 27.7 PG — SIGNIFICANT CHANGE UP (ref 27–34)
MCHC RBC-ENTMCNC: 31.3 GM/DL — LOW (ref 32–36)
MCV RBC AUTO: 88.3 FL — SIGNIFICANT CHANGE UP (ref 80–100)
PLATELET # BLD AUTO: 278 K/UL — SIGNIFICANT CHANGE UP (ref 150–400)
RBC # BLD: 4.37 M/UL — SIGNIFICANT CHANGE UP (ref 3.8–5.2)
RBC # FLD: 12.8 % — SIGNIFICANT CHANGE UP (ref 10.3–14.5)
WBC # BLD: 4.96 K/UL — SIGNIFICANT CHANGE UP (ref 3.8–10.5)
WBC # FLD AUTO: 4.96 K/UL — SIGNIFICANT CHANGE UP (ref 3.8–10.5)

## 2024-01-21 PROCEDURE — 85025 COMPLETE CBC W/AUTO DIFF WBC: CPT

## 2024-01-21 PROCEDURE — 83605 ASSAY OF LACTIC ACID: CPT

## 2024-01-21 PROCEDURE — 96375 TX/PRO/DX INJ NEW DRUG ADDON: CPT

## 2024-01-21 PROCEDURE — 83735 ASSAY OF MAGNESIUM: CPT

## 2024-01-21 PROCEDURE — C1889: CPT

## 2024-01-21 PROCEDURE — 85027 COMPLETE CBC AUTOMATED: CPT

## 2024-01-21 PROCEDURE — 96374 THER/PROPH/DIAG INJ IV PUSH: CPT

## 2024-01-21 PROCEDURE — 74177 CT ABD & PELVIS W/CONTRAST: CPT | Mod: MG

## 2024-01-21 PROCEDURE — 83690 ASSAY OF LIPASE: CPT

## 2024-01-21 PROCEDURE — 86850 RBC ANTIBODY SCREEN: CPT

## 2024-01-21 PROCEDURE — 86870 RBC ANTIBODY IDENTIFICATION: CPT

## 2024-01-21 PROCEDURE — 80053 COMPREHEN METABOLIC PANEL: CPT

## 2024-01-21 PROCEDURE — 36415 COLL VENOUS BLD VENIPUNCTURE: CPT

## 2024-01-21 PROCEDURE — 86901 BLOOD TYPING SEROLOGIC RH(D): CPT

## 2024-01-21 PROCEDURE — 82962 GLUCOSE BLOOD TEST: CPT

## 2024-01-21 PROCEDURE — 81003 URINALYSIS AUTO W/O SCOPE: CPT

## 2024-01-21 PROCEDURE — 86900 BLOOD TYPING SEROLOGIC ABO: CPT

## 2024-01-21 PROCEDURE — 88304 TISSUE EXAM BY PATHOLOGIST: CPT

## 2024-01-21 PROCEDURE — 83036 HEMOGLOBIN GLYCOSYLATED A1C: CPT

## 2024-01-21 PROCEDURE — 86880 COOMBS TEST DIRECT: CPT

## 2024-01-21 PROCEDURE — G1004: CPT

## 2024-01-21 PROCEDURE — 80048 BASIC METABOLIC PNL TOTAL CA: CPT

## 2024-01-21 PROCEDURE — 99285 EMERGENCY DEPT VISIT HI MDM: CPT | Mod: 25

## 2024-01-21 PROCEDURE — 76705 ECHO EXAM OF ABDOMEN: CPT

## 2024-01-21 PROCEDURE — 88304 TISSUE EXAM BY PATHOLOGIST: CPT | Mod: 26

## 2024-01-21 PROCEDURE — 84100 ASSAY OF PHOSPHORUS: CPT

## 2024-01-21 PROCEDURE — 93005 ELECTROCARDIOGRAM TRACING: CPT

## 2024-01-21 PROCEDURE — 84702 CHORIONIC GONADOTROPIN TEST: CPT

## 2024-01-21 DEVICE — CLIP APPLIER COVIDIEN ENDOCLIP III 5MM: Type: IMPLANTABLE DEVICE | Status: FUNCTIONAL

## 2024-01-21 RX ORDER — INSULIN LISPRO 100/ML
0 VIAL (ML) SUBCUTANEOUS
Refills: 0 | DISCHARGE

## 2024-01-21 RX ORDER — SODIUM CHLORIDE 9 MG/ML
1000 INJECTION, SOLUTION INTRAVENOUS
Refills: 0 | Status: DISCONTINUED | OUTPATIENT
Start: 2024-01-21 | End: 2024-01-21

## 2024-01-21 RX ORDER — DIPHENHYDRAMINE HCL 50 MG
12.5 CAPSULE ORAL ONCE
Refills: 0 | Status: COMPLETED | OUTPATIENT
Start: 2024-01-21 | End: 2024-01-21

## 2024-01-21 RX ORDER — HYDROMORPHONE HYDROCHLORIDE 2 MG/ML
0.25 INJECTION INTRAMUSCULAR; INTRAVENOUS; SUBCUTANEOUS
Refills: 0 | Status: DISCONTINUED | OUTPATIENT
Start: 2024-01-21 | End: 2024-01-21

## 2024-01-21 RX ORDER — LABETALOL HCL 100 MG
10 TABLET ORAL ONCE
Refills: 0 | Status: COMPLETED | OUTPATIENT
Start: 2024-01-21 | End: 2024-01-21

## 2024-01-21 RX ORDER — HYDROMORPHONE HYDROCHLORIDE 2 MG/ML
0.5 INJECTION INTRAMUSCULAR; INTRAVENOUS; SUBCUTANEOUS
Refills: 0 | Status: DISCONTINUED | OUTPATIENT
Start: 2024-01-21 | End: 2024-01-21

## 2024-01-21 RX ADMIN — HEPARIN SODIUM 5000 UNIT(S): 5000 INJECTION INTRAVENOUS; SUBCUTANEOUS at 14:43

## 2024-01-21 RX ADMIN — Medication 100 MILLIGRAM(S): at 14:32

## 2024-01-21 RX ADMIN — Medication 10 MILLIGRAM(S): at 12:20

## 2024-01-21 RX ADMIN — SODIUM CHLORIDE 150 MILLILITER(S): 9 INJECTION, SOLUTION INTRAVENOUS at 05:08

## 2024-01-21 RX ADMIN — CHLORHEXIDINE GLUCONATE 1 APPLICATION(S): 213 SOLUTION TOPICAL at 05:08

## 2024-01-21 RX ADMIN — Medication 200 MILLIGRAM(S): at 08:17

## 2024-01-21 RX ADMIN — ONDANSETRON 4 MILLIGRAM(S): 8 TABLET, FILM COATED ORAL at 05:26

## 2024-01-21 RX ADMIN — PANTOPRAZOLE SODIUM 40 MILLIGRAM(S): 20 TABLET, DELAYED RELEASE ORAL at 08:17

## 2024-01-21 RX ADMIN — Medication 100 MILLIGRAM(S): at 05:08

## 2024-01-21 RX ADMIN — ONDANSETRON 4 MILLIGRAM(S): 8 TABLET, FILM COATED ORAL at 12:40

## 2024-01-21 RX ADMIN — HEPARIN SODIUM 5000 UNIT(S): 5000 INJECTION INTRAVENOUS; SUBCUTANEOUS at 05:09

## 2024-01-21 RX ADMIN — Medication 12.5 MILLIGRAM(S): at 13:15

## 2024-01-21 NOTE — DISCHARGE NOTE PROVIDER - CARE PROVIDER_API CALL
Geovanni Carney  Vascular Surgery  [address] [place] [address], [address]  [place] [address]  Phone: [phone]  Fax: [phone]  Follow Up Time: 2 weeks

## 2024-01-21 NOTE — DISCHARGE NOTE NURSING/CASE MANAGEMENT/SOCIAL WORK - PATIENT PORTAL LINK FT
You can access the FollowMyHealth Patient Portal offered by [facility] by registering at the following website: [email or web address]. By joining [email or web address]’s FollowMyHealth portal, you will also be able to view your health information using other applications (apps) compatible with our system.

## 2024-01-21 NOTE — DISCHARGE NOTE PROVIDER - NSDCCPCAREPLAN_GEN_ALL_CORE_FT
PRINCIPAL DISCHARGE DIAGNOSIS  Diagnosis: Gallstones  Assessment and Plan of Treatment:      PRINCIPAL DISCHARGE DIAGNOSIS  Diagnosis: Gallstones  Assessment and Plan of Treatment: BATHING: Please do not submerge wound underwater. You may shower and/or sponge bathe.   ACTIVITY: No heavy lifting or straining. Otherwise, you may return to your usual level of physical activity. If you are taking narcotic pain medication (such as Percocet) DO NOT drive a car, operate machinery or make important decisions.  DIET: Return to your usual diet.  NOTIFY YOUR SURGEON IF: You have any bleeding that does not stop, any pus draining from your wound(s), any fever (over 100.4 F) or chills, persistent nausea/vomiting, persistent diarrhea, or if your pain is not controlled on your discharge pain medications.  FOLLOW-UP: Please follow up with your primary care physician and Dr. Carney's clinic [phone] in 10-14 days regarding your hospitalization. Call for appointment upon discharge.

## 2024-01-21 NOTE — PROGRESS NOTE ADULT - SUBJECTIVE AND OBJECTIVE BOX
HPI/Overnight Events:   Patient seen and examined at bedside this AM. No overnight events. No complaints. Denies fever, chills, nausea, vomiting, chest pain, SOB, dizziness, or any other concerning symptoms.    Vital Signs Last 24 Hrs  T(C): 36.6 (21 Jan 2024 04:16), Max: 37.4 (20 Jan 2024 12:32)  T(F): 97.9 (21 Jan 2024 04:16), Max: 99.4 (20 Jan 2024 12:32)  HR: 85 (21 Jan 2024 04:16) (80 - 95)  BP: 147/85 (21 Jan 2024 04:16) (142/87 - 169/93)  BP(mean): --  RR: 18 (21 Jan 2024 04:16) (18 - 18)  SpO2: 98% (21 Jan 2024 04:16) (96% - 98%)    Parameters below as of 21 Jan 2024 04:16  Patient On (Oxygen Delivery Method): room air        I&O's Detail      MEDICATIONS  (STANDING):  chlorhexidine 2% Cloths 1 Application(s) Topical every 12 hours  chlorhexidine 4% Liquid 1 Application(s) Topical every 12 hours  ciprofloxacin   IVPB      ciprofloxacin   IVPB 400 milliGRAM(s) IV Intermittent every 12 hours  dextrose 5%. 1000 milliLiter(s) (50 mL/Hr) IV Continuous <Continuous>  dextrose 5%. 1000 milliLiter(s) (100 mL/Hr) IV Continuous <Continuous>  dextrose 50% Injectable 25 Gram(s) IV Push once  dextrose 50% Injectable 12.5 Gram(s) IV Push once  dextrose 50% Injectable 25 Gram(s) IV Push once  famotidine Injectable 20 milliGRAM(s) IV Push at bedtime  glucagon  Injectable 1 milliGRAM(s) IntraMuscular once  heparin   Injectable 5000 Unit(s) SubCutaneous every 8 hours  influenza   Vaccine 0.5 milliLiter(s) IntraMuscular once  insulin lispro (ADMELOG) corrective regimen sliding scale   SubCutaneous three times a day before meals  lactated ringers. 1000 milliLiter(s) (150 mL/Hr) IV Continuous <Continuous>  metroNIDAZOLE  IVPB      metroNIDAZOLE  IVPB 500 milliGRAM(s) IV Intermittent every 8 hours  pantoprazole  Injectable 40 milliGRAM(s) IV Push with breakfast    MEDICATIONS  (PRN):  dextrose Oral Gel 15 Gram(s) Oral once PRN Blood Glucose LESS THAN 70 milliGRAM(s)/deciliter  ondansetron Injectable 4 milliGRAM(s) IV Push every 6 hours PRN Nausea      Physical Exam  Constitutional: patient resting comfortably in bed, in no acute distress  HEENT: EOMI, MMM  Respiratory: non-labored breathing on RA  Cardiovascular: RRR  Gastrointestinal: abdomen soft, painful but non-tender, non-distended, no rebound tenderness/guarding  Musculoskeletal: no joint pain, swelling or deformity; no limitation of movement  Vascular: extremities warm and well perfused  Integument: no wounds noted    LABS:                        13.6   7.57  )-----------( 309      ( 20 Jan 2024 14:03 )             40.6     01-20    138  |  103  |  9.1  ----------------------------<  82  3.9   |  23.0  |  0.53    Ca    8.5      20 Jan 2024 20:30  Phos  2.8     01-20  Mg     2.0     01-20    TPro  7.6  /  Alb  4.2  /  TBili  0.4  /  DBili  x   /  AST  14  /  ALT  12  /  AlkPhos  84  01-20      Urinalysis Basic - ( 20 Jan 2024 20:30 )    Color: x / Appearance: x / SG: x / pH: x  Gluc: 82 mg/dL / Ketone: x  / Bili: x / Urobili: x   Blood: x / Protein: x / Nitrite: x   Leuk Esterase: x / RBC: x / WBC x   Sq Epi: x / Non Sq Epi: x / Bacteria: x        Assessment:  42yo DM2-ID w/recent diagnosis of cholelithiasis. Symptomatic cholelithiasis vs chronic cholecystitis with poor PO intake resulting in hypoglycemia.    Plan  - OR today for lap kaylene  - ISS  - Pain control PRN  - DVT ppx: SQH, SCDs  - Dispo: OR    SCOTT Fernández MD
Pt admitted with acute kaylene  Pt to lg for lap kaylene today  Pt explaioned the procedure benefits riks and alternatives and [id number]

## 2024-01-21 NOTE — DISCHARGE NOTE PROVIDER - NSDCMRMEDTOKEN_GEN_ALL_CORE_FT
acetaminophen 650 mg oral tablet, extended release: 1 tab(s) orally every 8 hours MDD:4  famotidine 20 mg oral tablet: 1 tab(s) orally once a day (at bedtime)  ibuprofen 600 mg oral tablet: 1 tab(s) orally every 6 hours MDD:4  pantoprazole 40 mg oral delayed release tablet: 1 tab(s) orally once a day  Touhector Max SoloStar 300 units/mL subcutaneous solution: 80 unit(s) subcutaneous once a day   acetaminophen 650 mg oral tablet, extended release: 1 tab(s) orally every 8 hours MDD:4  famotidine 20 mg oral tablet: 1 tab(s) orally once a day (at bedtime)  pantoprazole 40 mg oral delayed release tablet: 1 tab(s) orally once a day  Toujeo Max SoloStar 300 units/mL subcutaneous solution: 80 unit(s) subcutaneous once a day

## 2024-01-21 NOTE — DISCHARGE NOTE PROVIDER - NSDCFUSCHEDAPPT_GEN_ALL_CORE_FT
John Physician [facility]  ANTEPARTUM [address]  Scheduled Appointment: 02/16/2024    John Mckay  [facility] Physician [facility]  OBGYNGEN [address]  Scheduled Appointment: 02/16/2024

## 2024-01-21 NOTE — DISCHARGE NOTE PROVIDER - NSDCFUADDINST_GEN_ALL_CORE_FT
BATHING: Please do not submerge wound underwater. You may shower and/or sponge bathe.   ACTIVITY: No heavy lifting or straining. Otherwise, you may return to your usual level of physical activity. If you are taking narcotic pain medication (such as Percocet) DO NOT drive a car, operate machinery or make important decisions.  DIET: Return to your usual diet.  NOTIFY YOUR SURGEON IF: You have any bleeding that does not stop, any pus draining from your wound(s), any fever (over 100.4 F) or chills, persistent nausea/vomiting, persistent diarrhea, or if your pain is not controlled on your discharge pain medications.  FOLLOW-UP: Please follow up with your primary care physician and Dr Carney Surgery clinic [phone] in 10-14 days regarding your hospitalization. Call for appointment upon discharge.

## 2024-01-21 NOTE — DISCHARGE NOTE PROVIDER - HOSPITAL COURSE
43F presenting with signs and symptoms of acute cholecystitis, underwent a laparoscopic cholecystectomy, did well, tolerated the procedure well.  To be discharged home to follow up as an outpatient

## 2024-01-26 LAB — SURGICAL PATHOLOGY STUDY: SIGNIFICANT CHANGE UP

## 2024-02-16 ENCOUNTER — APPOINTMENT (OUTPATIENT)
Dept: OBGYN | Facility: CLINIC | Age: 44
End: 2024-02-16
Payer: COMMERCIAL

## 2024-02-16 ENCOUNTER — APPOINTMENT (OUTPATIENT)
Dept: ANTEPARTUM | Facility: CLINIC | Age: 44
End: 2024-02-16
Payer: COMMERCIAL

## 2024-02-16 ENCOUNTER — ASOB RESULT (OUTPATIENT)
Age: 44
End: 2024-02-16

## 2024-02-16 VITALS — WEIGHT: 261 LBS | BODY MASS INDEX: 46.25 KG/M2 | HEIGHT: 63 IN

## 2024-02-16 DIAGNOSIS — N93.9 ABNORMAL UTERINE AND VAGINAL BLEEDING, UNSPECIFIED: ICD-10-CM

## 2024-02-16 DIAGNOSIS — N92.1 EXCESSIVE AND FREQUENT MENSTRUATION WITH IRREGULAR CYCLE: ICD-10-CM

## 2024-02-16 DIAGNOSIS — R93.89 ABNORMAL FINDINGS ON DIAGNOSTIC IMAGING OF OTHER SPECIFIED BODY STRUCTURES: ICD-10-CM

## 2024-02-16 DIAGNOSIS — D25.9 LEIOMYOMA OF UTERUS, UNSPECIFIED: ICD-10-CM

## 2024-02-16 DIAGNOSIS — N85.2 HYPERTROPHY OF UTERUS: ICD-10-CM

## 2024-02-16 DIAGNOSIS — N83.292 OTHER OVARIAN CYST, LEFT SIDE: ICD-10-CM

## 2024-02-16 DIAGNOSIS — N94.6 DYSMENORRHEA, UNSPECIFIED: ICD-10-CM

## 2024-02-16 DIAGNOSIS — N80.03 ADENOMYOSIS OF THE UTERUS: ICD-10-CM

## 2024-02-16 LAB — HCG UR QL: NEGATIVE

## 2024-02-16 PROCEDURE — 76856 US EXAM PELVIC COMPLETE: CPT | Mod: 59

## 2024-02-16 PROCEDURE — 76830 TRANSVAGINAL US NON-OB: CPT

## 2024-02-16 PROCEDURE — 58100 BIOPSY OF UTERUS LINING: CPT

## 2024-02-16 PROCEDURE — 81025 URINE PREGNANCY TEST: CPT

## 2024-02-16 PROCEDURE — 99214 OFFICE O/P EST MOD 30 MIN: CPT | Mod: 25

## 2024-02-17 PROBLEM — N94.6 DYSMENORRHEA: Status: ACTIVE | Noted: 2024-02-17

## 2024-02-17 PROBLEM — N80.03 ADENOMYOSIS OF UTERUS: Status: ACTIVE | Noted: 2024-02-17

## 2024-02-17 PROBLEM — N85.2 BULKY OR ENLARGED UTERUS: Status: ACTIVE | Noted: 2024-02-17

## 2024-02-17 PROBLEM — N83.292 OTHER OVARIAN CYST, LEFT SIDE: Status: ACTIVE | Noted: 2024-02-17

## 2024-02-17 PROBLEM — N92.1 MENOMETRORRHAGIA: Status: ACTIVE | Noted: 2024-02-17

## 2024-02-17 PROBLEM — D25.9 UTERINE LEIOMYOMA, UNSPECIFIED LOCATION: Status: ACTIVE | Noted: 2024-02-17

## 2024-02-17 PROBLEM — R93.89 ENDOMETRIAL THICKENING ON ULTRASOUND: Status: ACTIVE | Noted: 2024-02-17

## 2024-02-17 NOTE — REASON FOR VISIT
[Follow-Up] : a follow-up evaluation of [FreeTextEntry2] : Ultrasound, discussion of laboratory blood work, discussion of plan of care

## 2024-02-17 NOTE — PHYSICAL EXAM
[Appropriately responsive] : appropriately responsive [Alert] : alert [No Acute Distress] : no acute distress [Soft] : soft [Non-tender] : non-tender [Non-distended] : non-distended [No HSM] : No HSM [No Lesions] : no lesions [No Mass] : no mass [Oriented x3] : oriented x3 [Labia Majora] : normal [Labia Minora] : normal [Normal] : normal [FreeTextEntry6] : Enlarged uterus; endometrial biopsy performed as described

## 2024-02-17 NOTE — HISTORY OF PRESENT ILLNESS
[FreeTextEntry1] : 43-year-old female -1-0-2 presenting office for a transvaginal/pelvic ultrasound which was performed immediately prior to this appointment today, discussion of her laboratory: From 2023, and discussion of plan of care secondary to history of abnormal uterine bleeding, dysmenorrhea, menometrorrhagia.  She reports undergoing a laparoscopic cholecystectomy in 2024.  She is recovering well.  She has no signs or symptoms of acute on chronic anemia at this time.  She was initially seen in 2023 for her annual well woman appointment and for an additional concern of heavy menstrual bleeding, going through several pads per hour at times.  Her menstrual period was heavy in the past, but became twice is heavy after delivery of her son.  She is through childbearing and ended in a definitive surgical procedure, and is affecting her daily life  Obstetrical history of 2 prior  sections, the last with bilateral salpingectomy for permanent sterilization on 2022.  She was a gynecologic history significant for abnormal uterine bleeding/menometrorrhagia/dysmenorrhea, including 2 prior dilation and curettage procedures and a laparoscopic surgery for endometriosis.  Cervical Pap smear performed 10/27/2023 resulted NILM and negative high risk HPV.

## 2024-02-17 NOTE — COUNSELING
[Confidentiality] : confidentiality [Lab Results] : lab results [Medication Management] : medication management [Pre/Post Op Instructions] : pre/post op instructions

## 2024-02-17 NOTE — PLAN
[FreeTextEntry1] : Transvaginal/pelvic ultrasound performed immediately prior to this appointment was reviewed with her in detail.  Uterus measures 10.8 x 6.9 x 7.3 cm and is heterogeneous and myomatous, anteverted.  Endometrial lining is thickened measuring 17.4 mm also heterogeneous.  There are 2 noted fibroids measuring 4 cm and 1.5 cm.  After risk-benefit alternatives were discussed and endometrial biopsy was performed to rule out hyperplasia and malignancy in preparation for possible definitive surgery.  Her laboratory blood work from 11/1/2023 was significant for an elevated HbA1c of 8.9, elevated prolactin of 66, elevated .  Risk-benefit alternatives of options for further treatment discussed including conservative versus medical versus surgical.  She has had medical treatment consisting of a Mirena IUD which she failed.  She is  leaning towards definitive surgical procedure.  Robotic assisted total laparoscopic hysterectomy with bilateral salpingectomy discussed.  Risks of exploratory laparotomy explained.  We will await the results of her endometrial biopsy.  Once resulted, she will be scheduled for the procedure, or may be referred to gynecologic oncology if results hyperplasia/malignancy.  She is given opportunity ask questions all questions were addressed.  She was given call, follow-up, ER precautions regarding signs and symptoms of abnormal bleeding and infection.  She understands plan of care.

## 2024-02-22 LAB — GLUCOSE BLDC GLUCOMTR-MCNC: 103 MG/DL — HIGH (ref 70–99)

## 2024-03-19 LAB — CORE LAB BIOPSY: NORMAL

## 2024-04-23 ENCOUNTER — OUTPATIENT (OUTPATIENT)
Dept: OUTPATIENT SERVICES | Facility: HOSPITAL | Age: 44
LOS: 1 days | End: 2024-04-23
Payer: COMMERCIAL

## 2024-04-23 VITALS
SYSTOLIC BLOOD PRESSURE: 120 MMHG | OXYGEN SATURATION: 98 % | TEMPERATURE: 97 F | DIASTOLIC BLOOD PRESSURE: 80 MMHG | HEART RATE: 81 BPM | WEIGHT: 263.45 LBS | RESPIRATION RATE: 16 BRPM | HEIGHT: 66 IN

## 2024-04-23 DIAGNOSIS — Z90.49 ACQUIRED ABSENCE OF OTHER SPECIFIED PARTS OF DIGESTIVE TRACT: Chronic | ICD-10-CM

## 2024-04-23 DIAGNOSIS — Z98.891 HISTORY OF UTERINE SCAR FROM PREVIOUS SURGERY: Chronic | ICD-10-CM

## 2024-04-23 DIAGNOSIS — N94.6 DYSMENORRHEA, UNSPECIFIED: ICD-10-CM

## 2024-04-23 DIAGNOSIS — Z98.890 OTHER SPECIFIED POSTPROCEDURAL STATES: Chronic | ICD-10-CM

## 2024-04-23 DIAGNOSIS — J45.909 UNSPECIFIED ASTHMA, UNCOMPLICATED: ICD-10-CM

## 2024-04-23 DIAGNOSIS — Z29.9 ENCOUNTER FOR PROPHYLACTIC MEASURES, UNSPECIFIED: ICD-10-CM

## 2024-04-23 DIAGNOSIS — K21.9 GASTRO-ESOPHAGEAL REFLUX DISEASE WITHOUT ESOPHAGITIS: ICD-10-CM

## 2024-04-23 DIAGNOSIS — Z01.818 ENCOUNTER FOR OTHER PREPROCEDURAL EXAMINATION: ICD-10-CM

## 2024-04-23 DIAGNOSIS — N92.1 EXCESSIVE AND FREQUENT MENSTRUATION WITH IRREGULAR CYCLE: ICD-10-CM

## 2024-04-23 DIAGNOSIS — E11.9 TYPE 2 DIABETES MELLITUS WITHOUT COMPLICATIONS: ICD-10-CM

## 2024-04-23 DIAGNOSIS — Z98.51 TUBAL LIGATION STATUS: Chronic | ICD-10-CM

## 2024-04-23 DIAGNOSIS — N93.9 ABNORMAL UTERINE AND VAGINAL BLEEDING, UNSPECIFIED: ICD-10-CM

## 2024-04-23 LAB
A1C WITH ESTIMATED AVERAGE GLUCOSE RESULT: 7.8 % — HIGH (ref 4–5.6)
ALLERGY+IMMUNOLOGY DIAG STUDY NOTE: SIGNIFICANT CHANGE UP
ANION GAP SERPL CALC-SCNC: 9 MMOL/L — SIGNIFICANT CHANGE UP (ref 5–17)
BASOPHILS # BLD AUTO: 0.05 K/UL — SIGNIFICANT CHANGE UP (ref 0–0.2)
BASOPHILS NFR BLD AUTO: 0.8 % — SIGNIFICANT CHANGE UP (ref 0–2)
BLD GP AB SCN SERPL QL: SIGNIFICANT CHANGE UP
BUN SERPL-MCNC: 13.8 MG/DL — SIGNIFICANT CHANGE UP (ref 8–20)
CALCIUM SERPL-MCNC: 8.8 MG/DL — SIGNIFICANT CHANGE UP (ref 8.4–10.5)
CHLORIDE SERPL-SCNC: 101 MMOL/L — SIGNIFICANT CHANGE UP (ref 96–108)
CO2 SERPL-SCNC: 26 MMOL/L — SIGNIFICANT CHANGE UP (ref 22–29)
CREAT SERPL-MCNC: 0.58 MG/DL — SIGNIFICANT CHANGE UP (ref 0.5–1.3)
DIR ANTIGLOB POLYSPECIFIC INTERPRETATION: SIGNIFICANT CHANGE UP
EGFR: 116 ML/MIN/1.73M2 — SIGNIFICANT CHANGE UP
EOSINOPHIL # BLD AUTO: 0.19 K/UL — SIGNIFICANT CHANGE UP (ref 0–0.5)
EOSINOPHIL NFR BLD AUTO: 3.1 % — SIGNIFICANT CHANGE UP (ref 0–6)
ESTIMATED AVERAGE GLUCOSE: 177 MG/DL — HIGH (ref 68–114)
GLUCOSE SERPL-MCNC: 174 MG/DL — HIGH (ref 70–99)
HCG SERPL-ACNC: <4 MIU/ML — SIGNIFICANT CHANGE UP
HCT VFR BLD CALC: 37.4 % — SIGNIFICANT CHANGE UP (ref 34.5–45)
HGB BLD-MCNC: 12.2 G/DL — SIGNIFICANT CHANGE UP (ref 11.5–15.5)
IMM GRANULOCYTES NFR BLD AUTO: 0.3 % — SIGNIFICANT CHANGE UP (ref 0–0.9)
LYMPHOCYTES # BLD AUTO: 1.26 K/UL — SIGNIFICANT CHANGE UP (ref 1–3.3)
LYMPHOCYTES # BLD AUTO: 20.7 % — SIGNIFICANT CHANGE UP (ref 13–44)
MCHC RBC-ENTMCNC: 28.6 PG — SIGNIFICANT CHANGE UP (ref 27–34)
MCHC RBC-ENTMCNC: 32.6 GM/DL — SIGNIFICANT CHANGE UP (ref 32–36)
MCV RBC AUTO: 87.8 FL — SIGNIFICANT CHANGE UP (ref 80–100)
MONOCYTES # BLD AUTO: 0.4 K/UL — SIGNIFICANT CHANGE UP (ref 0–0.9)
MONOCYTES NFR BLD AUTO: 6.6 % — SIGNIFICANT CHANGE UP (ref 2–14)
NEUTROPHILS # BLD AUTO: 4.18 K/UL — SIGNIFICANT CHANGE UP (ref 1.8–7.4)
NEUTROPHILS NFR BLD AUTO: 68.5 % — SIGNIFICANT CHANGE UP (ref 43–77)
PLATELET # BLD AUTO: 258 K/UL — SIGNIFICANT CHANGE UP (ref 150–400)
POTASSIUM SERPL-MCNC: 4.6 MMOL/L — SIGNIFICANT CHANGE UP (ref 3.5–5.3)
POTASSIUM SERPL-SCNC: 4.6 MMOL/L — SIGNIFICANT CHANGE UP (ref 3.5–5.3)
RBC # BLD: 4.26 M/UL — SIGNIFICANT CHANGE UP (ref 3.8–5.2)
RBC # FLD: 13.1 % — SIGNIFICANT CHANGE UP (ref 10.3–14.5)
SODIUM SERPL-SCNC: 136 MMOL/L — SIGNIFICANT CHANGE UP (ref 135–145)
WBC # BLD: 6.1 K/UL — SIGNIFICANT CHANGE UP (ref 3.8–10.5)
WBC # FLD AUTO: 6.1 K/UL — SIGNIFICANT CHANGE UP (ref 3.8–10.5)

## 2024-04-23 PROCEDURE — 93010 ELECTROCARDIOGRAM REPORT: CPT

## 2024-04-23 PROCEDURE — 86077 PHYS BLOOD BANK SERV XMATCH: CPT

## 2024-04-23 RX ORDER — FAMOTIDINE 10 MG/ML
1 INJECTION INTRAVENOUS
Refills: 0 | DISCHARGE

## 2024-04-23 NOTE — H&P PST ADULT - NSICDXFAMILYHX_GEN_ALL_CORE_FT
FAMILY HISTORY:  Father  Still living? Yes, Estimated age: Age Unknown  FH: prostate cancer, Age at diagnosis: Age Unknown    Mother  Still living? Yes, Estimated age: Age Unknown  FH: diabetes mellitus, Age at diagnosis: Age Unknown  FH: heart attack, Age at diagnosis: Age Unknown  FH: stroke, Age at diagnosis: Age Unknown  FH: uterine cancer, Age at diagnosis: Age Unknown

## 2024-04-23 NOTE — H&P PST ADULT - PROBLEM SELECTOR PLAN 5
Robotic assisted Total Laparoscopic Hysterectomy possible bilateral salpingo-oophorectomy possible exploratory laparotomy by dr. Mckay on 5/13/24. Medical Clearance pending

## 2024-04-23 NOTE — H&P PST ADULT - ATTENDING COMMENTS
Risks, benefits, and alternatives of Examination Under Anesthesia, Robotic Assisted Total Laparoscopic Hysterectomy, Possible Bilateral Salpingo-oophorectomy, Possible Exploratory Laparotomy and Any Indicated Procedures discussed at length, including risk of injury to adjacent organs, such as the bladder, bowel, and bilateral ureters, risk of infection, and risk of hemorrhage, which may lead to subsequent intervention and possible blood transfusion.  She has no [employer]ism or personal objection to blood transfusion, if necessary.  She was given the opportunity to ask questions and all were addressed.  She understands the plan of care.    John Mckay, DO

## 2024-04-23 NOTE — H&P PST ADULT - NSICDXPASTSURGICALHX_GEN_ALL_CORE_FT
PAST SURGICAL HISTORY:  H/O  section x 1 (2019)    H/O exploratory laparotomy     H/O knee surgery     H/O lumpectomy 2003    History of colposcopy 4 times    History of removal of cyst removed 8 cysts from head     PAST SURGICAL HISTORY:  H/O  section x 1 (2019)    H/O exploratory laparotomy     H/O knee surgery     H/O lumpectomy     History of bilateral ligation of fallopian tubes     History of cholecystectomy     History of colposcopy 4 times    History of removal of cyst removed 8 cysts from head

## 2024-04-23 NOTE — H&P PST ADULT - ASSESSMENT
CAPRINI VTE 2.0 SCORE [CLOT updated 2019]    AGE RELATED RISK FACTORS                                                       MOBILITY RELATED FACTORS  [ ] Age 41-60 years                                            (1 Point)                    [ ] Bed rest                                                        (1 Point)  [ ] Age: 61-74 years                                           (2 Points)                  [ ] Plaster cast                                                   (2 Points)  [ ] Age= 75 years                                              (3 Points)                    [ ] Bed bound for more than 72 hours                 (2 Points)    DISEASE RELATED RISK FACTORS                                               GENDER SPECIFIC FACTORS  [ ] Edema in the lower extremities                       (1 Point)              [ ] Pregnancy                                                     (1 Point)  [ ] Varicose veins                                               (1 Point)                     [ ] Post-partum < 6 weeks                                   (1 Point)             [ ] BMI > 25 Kg/m2                                            (1 Point)                     [ ] Hormonal therapy  or oral contraception          (1 Point)                 [ ] Sepsis (in the previous month)                        (1 Point)               [ ] History of pregnancy complications                 (1 point)  [ ] Pneumonia or serious lung disease                                               [ ] Unexplained or recurrent                     (1 Point)           (in the previous month)                               (1 Point)  [ ] Abnormal pulmonary function test                     (1 Point)                 SURGERY RELATED RISK FACTORS  [ ] Acute myocardial infarction                              (1 Point)               [ ]  Section                                             (1 Point)  [ ] Congestive heart failure (in the previous month)  (1 Point)      [ ] Minor surgery                                                  (1 Point)   [ ] Inflammatory bowel disease                             (1 Point)               [ ] Arthroscopic surgery                                        (2 Points)  [ ] Central venous access                                      (2 Points)                [ ] General surgery lasting more than 45 minutes (2 points)  [ ] Malignancy- Present or previous                   (2 Points)                [ ] Elective arthroplasty                                         (5 points)    [ ] Stroke (in the previous month)                          (5 Points)                                                                                                                                                           HEMATOLOGY RELATED FACTORS                                                 TRAUMA RELATED RISK FACTORS  [ ] Prior episodes of VTE                                     (3 Points)                [ ] Fracture of the hip, pelvis, or leg                       (5 Points)  [ ] Positive family history for VTE                         (3 Points)             [ ] Acute spinal cord injury (in the previous month)  (5 Points)  [ ] Prothrombin 43370 A                                     (3 Points)               [ ] Paralysis  (less than 1 month)                             (5 Points)  [ ] Factor V Leiden                                             (3 Points)                  [ ] Multiple Trauma within 1 month                        (5 Points)  [ ] Lupus anticoagulants                                     (3 Points)                                                           [ ] Anticardiolipin antibodies                               (3 Points)                                                       [ ] High homocysteine in the blood                      (3 Points)                                             [ ] Other congenital or acquired thrombophilia      (3 Points)                                                [ ] Heparin induced thrombocytopenia                  (3 Points)                                     Total Score [          ]  OPIOID RISK TOOL    JENNIFER EACH BOX THAT APPLIES AND ADD TOTALS AT THE END    FAMILY HISTORY OF SUBSTANCE ABUSE                 FEMALE         MALE                                                Alcohol                             [  ]1 pt          [  ]3pts                                               Illegal Drugs                     [  ]2 pts        [  ]3pts                                               Rx Drugs                           [  ]4 pts        [  ]4 pts    PERSONAL HISTORY OF SUBSTANCE ABUSE                                                                                          Alcohol                             [  ]3 pts       [  ]3 pts                                               Illegal Drugs                     [  ]4 pts        [  ]4 pts                                               Rx Drugs                           [  ]5 pts        [  ]5 pts    AGE BETWEEN 16-45 YEARS                                      [  ]1 pt         [  ]1 pt    HISTORY OF PREADOLESCENT   SEXUAL ABUSE                                                             [  ]3 pts        [  ]0pts    PSYCHOLOGICAL DISEASE                     ADD, OCD, Bipolar, Schizophrenia        [  ]2 pts         [  ]2 pts                      Depression                                               [  ]1 pt           [  ]1 pt           SCORING TOTAL   (add numbers and type here)              (***)                                     A score of 3 or lower indicated LOW risk for future opioid abuse  A score of 4 to 7 indicated moderate risk for future opioid abuse  A score of 8 or higher indicates a high risk for opioid abuse 43 year old female with  PMH DM2, mild asthma, and GERD here for PST with history of abnormal uterine bleeding, dysmenorrhea, menometrorrhagia. She reports undergoing a laparoscopic cholecystectomy in 2024 and recovered well.    Her menstrual period was heavy in the past, but became twice is heavy after delivery of her son in , She is through childbearing and ended in a definitive surgical procedure, Obstetrical history of 2 prior  sections, the last with bilateral salpingectomy for permanent sterilization on 2022. Today she feels well, Denies CP/SOB, no subjective fevers or chills. She is scheduled for Robotic assisted Total Laparoscopic Hysterectomy possible bilateral salpingo-oophorectomy possible exploratory laparotomy by dr. Mckay on 24. Medical Clearance pending     medications reviewed, instructions given on what medications to take and what not to take.   she can continue to take her Pantoprazole till the AM of surgery.  she will stop RYBELSUS one week before, she was instructed by her Endocrinologist.  stop MVI one week before.  Asked the pt not to take any NSAID's 5-7 days before surgery and told the pt Tylenol is okay to take for pain, pt verbalized understanding.  pt is not taking ASA/Plavix/Anticoagulation medication at this time.  ERP teaching given and the pt verbalized understanding.     CAPRINI VTE 2.0 SCORE [CLOT updated 2019]    AGE RELATED RISK FACTORS                                                       MOBILITY RELATED FACTORS  [x ] Age 41-60 years                                            (1 Point)                    [ ] Bed rest                                                        (1 Point)  [ ] Age: 61-74 years                                           (2 Points)                  [ ] Plaster cast                                                   (2 Points)  [ ] Age= 75 years                                              (3 Points)                    [ ] Bed bound for more than 72 hours                 (2 Points)    DISEASE RELATED RISK FACTORS                                               GENDER SPECIFIC FACTORS  [ ] Edema in the lower extremities                       (1 Point)              [ ] Pregnancy                                                     (1 Point)  [ ] Varicose veins                                               (1 Point)                     [ ] Post-partum < 6 weeks                                   (1 Point)             [ x] BMI > 25 Kg/m2                                            (1 Point)                     [ ] Hormonal therapy  or oral contraception          (1 Point)                 [ ] Sepsis (in the previous month)                        (1 Point)               [ ] History of pregnancy complications                 (1 point)  [ ] Pneumonia or serious lung disease                                               [ ] Unexplained or recurrent                     (1 Point)           (in the previous month)                               (1 Point)  [ ] Abnormal pulmonary function test                     (1 Point)                 SURGERY RELATED RISK FACTORS  [ ] Acute myocardial infarction                              (1 Point)               [ ]  Section                                             (1 Point)  [ ] Congestive heart failure (in the previous month)  (1 Point)      [ ] Minor surgery                                                  (1 Point)   [ ] Inflammatory bowel disease                             (1 Point)               [ ] Arthroscopic surgery                                        (2 Points)  [ ] Central venous access                                      (2 Points)                [ x] General surgery lasting more than 45 minutes (2 points)  [ ] Malignancy- Present or previous                   (2 Points)                [ ] Elective arthroplasty                                         (5 points)    [ ] Stroke (in the previous month)                          (5 Points)                                                                                                                                                           HEMATOLOGY RELATED FACTORS                                                 TRAUMA RELATED RISK FACTORS  [ ] Prior episodes of VTE                                     (3 Points)                [ ] Fracture of the hip, pelvis, or leg                       (5 Points)  [ ] Positive family history for VTE                         (3 Points)             [ ] Acute spinal cord injury (in the previous month)  (5 Points)  [ ] Prothrombin 22082 A                                     (3 Points)               [ ] Paralysis  (less than 1 month)                             (5 Points)  [ ] Factor V Leiden                                             (3 Points)                  [ ] Multiple Trauma within 1 month                        (5 Points)  [ ] Lupus anticoagulants                                     (3 Points)                                                           [ ] Anticardiolipin antibodies                               (3 Points)                                                       [ ] High homocysteine in the blood                      (3 Points)                                             [ ] Other congenital or acquired thrombophilia      (3 Points)                                                [ ] Heparin induced thrombocytopenia                  (3 Points)                                     Total Score [    4      ]  OPIOID RISK TOOL    JENNIFER EACH BOX THAT APPLIES AND ADD TOTALS AT THE END    FAMILY HISTORY OF SUBSTANCE ABUSE                 FEMALE         MALE                                                Alcohol                             [  ]1 pt          [  ]3pts                                               Illegal Drugs                     [  ]2 pts        [  ]3pts                                               Rx Drugs                           [  ]4 pts        [  ]4 pts    PERSONAL HISTORY OF SUBSTANCE ABUSE                                                                                          Alcohol                             [  ]3 pts       [  ]3 pts                                               Illegal Drugs                     [  ]4 pts        [  ]4 pts                                               Rx Drugs                           [  ]5 pts        [  ]5 pts    AGE BETWEEN 16-45 YEARS                                      [ x ]1 pt         [  ]1 pt    HISTORY OF PREADOLESCENT   SEXUAL ABUSE                                                             [  ]3 pts        [  ]0pts    PSYCHOLOGICAL DISEASE                     ADD, OCD, Bipolar, Schizophrenia        [  ]2 pts         [  ]2 pts                      Depression                                               [  ]1 pt           [  ]1 pt           SCORING TOTAL   (add numbers and type here)              ( 0)                                     A score of 3 or lower indicated LOW risk for future opioid abuse  A score of 4 to 7 indicated moderate risk for future opioid abuse  A score of 8 or higher indicates a high risk for opioid abuse

## 2024-04-23 NOTE — H&P PST ADULT - HISTORY OF PRESENT ILLNESS
43 year old female with  PMH DM2 and GERD presents to ER with complaints of persistent RUQ pain. Patient notes pain first presented 8 months ago and has been constantly post-prandial in nature. Approximately 1 week ago, RUQ pain became persistent, worsening the night before presentation. She has had nausea, vomiting, and diarrhea associated with the pain. Denies CP/SOB, no subjective fevers or chills. 43 year old female with  PMH DM2 and GERD history of abnormal uterine bleeding, dysmenorrhea, menometrorrhagia. She reports undergoing a laparoscopic cholecystectomy in 2024. She is recovering well.   She was initially seen in 2023 for her annual well woman appointment and for an additional concern of heavy menstrual bleeding, going through several pads per hour at times. Her menstrual period was heavy in the past, but became twice is heavy after delivery of her son in , She is through childbearing and ended in a definitive surgical procedure, Obstetrical history of 2 prior  sections, the last with bilateral salpingectomy for permanent sterilization on 2022. She was a gynecologic history significant for abnormal uterine bleeding/menometrorrhagia/dysmenorrhea, including 2 prior dilation and curettage procedures and a laparoscopic surgery for endometriosis.   Today she feels well, Denies CP/SOB, no subjective fevers or chills. 43 year old female with  PMH DM2, mild asthma, and GERD here for PST with history of abnormal uterine bleeding, dysmenorrhea, menometrorrhagia. She reports undergoing a laparoscopic cholecystectomy in 2024 and recovered well.    Her menstrual period was heavy in the past, but became twice is heavy after delivery of her son in , She is through childbearing and ended in a definitive surgical procedure, Obstetrical history of 2 prior  sections, the last with bilateral salpingectomy for permanent sterilization on 2022. Today she feels well, Denies CP/SOB, no subjective fevers or chills. She is scheduled for Robotic assisted Total Laparoscopic Hysterectomy possible bilateral salpingo-oophorectomy possible exploratory laparotomy by dr. Mckay on 24. Medical Clearance pending

## 2024-05-10 ENCOUNTER — TRANSCRIPTION ENCOUNTER (OUTPATIENT)
Age: 44
End: 2024-05-10

## 2024-05-10 NOTE — ASU DISCHARGE PLAN (ADULT/PEDIATRIC) - CARE PROVIDER_API CALL
John Mckay  Obstetrics and Gynecology  [address] [facility] [address], [address]  [place] [address]  Phone: [phone]  Fax: [phone]  Established Patient  Follow Up Time: 1 week

## 2024-05-10 NOTE — ASU DISCHARGE PLAN (ADULT/PEDIATRIC) - SIGNS AND SYMPTOMS OF INFECTION: FEVER, REDNESS, SWELLING, FOUL SMELLING DISCHARGE
RN Conjunctivitis Protocol: Ages 2 and older  Driss Pope is a 68 year old male is having symptoms reviewed for possible conjunctivitis.      ASSESSMENT/PLAN:  Allergy to Sulfa?  No   1.  Medication Indicated: YES - POLYTRIM 71116-6.1 UNIT/ML-% OP Sol, 1 drop in affected eye(s) 4 times daily while awake x 7 days. .    2.  Education regarding contact precautions, hand washing, avoid wearing contacts until finished with drops or until symptoms resolve, contact clinic if there is no improvement of symptoms within 3 days and if develops eye pain or sensitivity to light.   3.  Follow-up: Schedule an appointment with a provider if symptoms do not improve after 3 days of antibiotics or if symptoms return after antibiotic therapy is complete.  4.  Patient verbalized understanding of this plan and is agreeable.    SUBJECTIVE:     Conjunctival symptoms: redness, mattering (yellow/green), itching and watery or pus discharge   Location: both eyes  Onset: 2 days ago  In addition notes: None  Contact Lens use?: No  Complicating factors    Reports:NONE  Denies:Vision changes; not cleared with blinking, Recent  history of eye trauma, Sensitivity to light, Severe eye pain, Fever:Eyelid symptoms     OBJECTIVE:      NURSING PLAN: Nursing advice to patient home care per protocol    EDUCATION:      RECOMMENDED DISPOSITION:  Home care advice - per protocol  Will comply with recommendation: Yes  Encounter handled by: Nurse Triage.     Carolin Muro RN       Statement Selected

## 2024-05-12 DIAGNOSIS — Z01.818 ENCOUNTER FOR OTHER PREPROCEDURAL EXAMINATION: ICD-10-CM

## 2024-05-12 PROCEDURE — 80048 BASIC METABOLIC PNL TOTAL CA: CPT

## 2024-05-12 PROCEDURE — 85025 COMPLETE CBC W/AUTO DIFF WBC: CPT

## 2024-05-12 PROCEDURE — 86902 BLOOD TYPE ANTIGEN DONOR EA: CPT

## 2024-05-12 PROCEDURE — G0463: CPT

## 2024-05-12 PROCEDURE — 86880 COOMBS TEST DIRECT: CPT

## 2024-05-12 PROCEDURE — 83036 HEMOGLOBIN GLYCOSYLATED A1C: CPT

## 2024-05-12 PROCEDURE — 86870 RBC ANTIBODY IDENTIFICATION: CPT

## 2024-05-12 PROCEDURE — 93005 ELECTROCARDIOGRAM TRACING: CPT

## 2024-05-12 PROCEDURE — 36415 COLL VENOUS BLD VENIPUNCTURE: CPT

## 2024-05-12 PROCEDURE — 86850 RBC ANTIBODY SCREEN: CPT

## 2024-05-12 PROCEDURE — 86922 COMPATIBILITY TEST ANTIGLOB: CPT

## 2024-05-12 PROCEDURE — 86900 BLOOD TYPING SEROLOGIC ABO: CPT

## 2024-05-12 PROCEDURE — 86901 BLOOD TYPING SEROLOGIC RH(D): CPT

## 2024-05-12 PROCEDURE — 84702 CHORIONIC GONADOTROPIN TEST: CPT

## 2024-05-12 RX ORDER — OXYCODONE AND ACETAMINOPHEN 5; 325 MG/1; MG/1
5-325 TABLET ORAL EVERY 6 HOURS
Qty: 20 | Refills: 0 | Status: ACTIVE | COMMUNITY
Start: 2024-05-12 | End: 1900-01-01

## 2024-05-12 RX ORDER — ONDANSETRON 4 MG/1
4 TABLET, ORALLY DISINTEGRATING ORAL
Qty: 30 | Refills: 2 | Status: ACTIVE | COMMUNITY
Start: 2024-05-12 | End: 1900-01-01

## 2024-05-12 RX ORDER — IBUPROFEN 600 MG/1
600 TABLET, FILM COATED ORAL EVERY 6 HOURS
Qty: 90 | Refills: 1 | Status: ACTIVE | COMMUNITY
Start: 2024-05-12 | End: 1900-01-01

## 2024-05-12 RX ORDER — SIMETHICONE 80 MG/1
80 TABLET, CHEWABLE ORAL
Qty: 20 | Refills: 0 | Status: ACTIVE | COMMUNITY
Start: 2024-05-12 | End: 1900-01-01

## 2024-05-13 ENCOUNTER — APPOINTMENT (OUTPATIENT)
Dept: OBGYN | Facility: HOSPITAL | Age: 44
End: 2024-05-13

## 2024-05-13 ENCOUNTER — OUTPATIENT (OUTPATIENT)
Dept: OUTPATIENT SERVICES | Facility: HOSPITAL | Age: 44
LOS: 1 days | End: 2024-05-13
Payer: COMMERCIAL

## 2024-05-13 VITALS
HEART RATE: 85 BPM | OXYGEN SATURATION: 100 % | WEIGHT: 263.45 LBS | DIASTOLIC BLOOD PRESSURE: 87 MMHG | HEIGHT: 66 IN | TEMPERATURE: 98 F | RESPIRATION RATE: 16 BRPM | SYSTOLIC BLOOD PRESSURE: 163 MMHG

## 2024-05-13 VITALS
SYSTOLIC BLOOD PRESSURE: 142 MMHG | DIASTOLIC BLOOD PRESSURE: 94 MMHG | OXYGEN SATURATION: 100 % | RESPIRATION RATE: 14 BRPM | HEART RATE: 80 BPM

## 2024-05-13 DIAGNOSIS — N92.1 EXCESSIVE AND FREQUENT MENSTRUATION WITH IRREGULAR CYCLE: ICD-10-CM

## 2024-05-13 DIAGNOSIS — N94.6 DYSMENORRHEA, UNSPECIFIED: ICD-10-CM

## 2024-05-13 DIAGNOSIS — Z98.890 OTHER SPECIFIED POSTPROCEDURAL STATES: Chronic | ICD-10-CM

## 2024-05-13 DIAGNOSIS — Z98.891 HISTORY OF UTERINE SCAR FROM PREVIOUS SURGERY: Chronic | ICD-10-CM

## 2024-05-13 DIAGNOSIS — N93.9 ABNORMAL UTERINE AND VAGINAL BLEEDING, UNSPECIFIED: ICD-10-CM

## 2024-05-13 DIAGNOSIS — Z90.49 ACQUIRED ABSENCE OF OTHER SPECIFIED PARTS OF DIGESTIVE TRACT: Chronic | ICD-10-CM

## 2024-05-13 DIAGNOSIS — Z98.51 TUBAL LIGATION STATUS: Chronic | ICD-10-CM

## 2024-05-13 PROCEDURE — 82962 GLUCOSE BLOOD TEST: CPT

## 2024-05-13 PROCEDURE — C9399: CPT

## 2024-05-13 PROCEDURE — 58570 TLH UTERUS 250 G OR LESS: CPT | Mod: 73

## 2024-05-13 DEVICE — FLOSEAL WITH RECOTHROM THROMBIN 10ML: Type: IMPLANTABLE DEVICE | Status: FUNCTIONAL

## 2024-05-13 RX ORDER — VANCOMYCIN HCL 1 G
1750 VIAL (EA) INTRAVENOUS ONCE
Refills: 0 | Status: COMPLETED | OUTPATIENT
Start: 2024-05-13 | End: 2024-05-13

## 2024-05-13 RX ORDER — GENTAMICIN SULFATE 40 MG/ML
420 VIAL (ML) INJECTION ONCE
Refills: 0 | Status: DISCONTINUED | OUTPATIENT
Start: 2024-05-13 | End: 2024-05-13

## 2024-05-13 RX ORDER — HEPARIN SODIUM 5000 [USP'U]/ML
5000 INJECTION INTRAVENOUS; SUBCUTANEOUS ONCE
Refills: 0 | Status: COMPLETED | OUTPATIENT
Start: 2024-05-13 | End: 2024-05-13

## 2024-05-13 RX ORDER — SEMAGLUTIDE 0.68 MG/ML
1 INJECTION, SOLUTION SUBCUTANEOUS
Refills: 0 | DISCHARGE

## 2024-05-13 RX ORDER — PANTOPRAZOLE SODIUM 20 MG/1
1 TABLET, DELAYED RELEASE ORAL
Refills: 0 | DISCHARGE

## 2024-05-13 RX ORDER — INSULIN GLARGINE 100 [IU]/ML
80 INJECTION, SOLUTION SUBCUTANEOUS
Refills: 0 | DISCHARGE

## 2024-05-13 RX ADMIN — HEPARIN SODIUM 5000 UNIT(S): 5000 INJECTION INTRAVENOUS; SUBCUTANEOUS at 13:18

## 2024-05-13 RX ADMIN — Medication 333.33 MILLIGRAM(S): at 13:43

## 2024-05-13 NOTE — CHART NOTE - NSCHARTNOTEFT_GEN_A_CORE
Patient was scheduled to undergo a robot-assisted laparoscopic hysterectomy. Patient underwent a tilt test to test her peak airway pressures prior to the start of the procedure. After evaluation by CRNA and 2 anesthesiologists the decision was made to abort the surgery due to safety concerns of the elevated peak airway pressures. Patient was scheduled to undergo a robot-assisted laparoscopic hysterectomy. Patient underwent a tilt test to test her peak airway pressures prior to the start of the procedure. After evaluation by CRNA and 2 anesthesiologists the decision was made to abort the surgery due to safety concerns of the elevated peak airway pressures.    Addendum:    Cancelled secondary to Pt safety and did not convert to open, as Pt is stable and this is not an emergency procedure.  She also has a Hx of wound complication s/p abdominal surgery in the past.  Discussed with Pt and  afterward and she will see a Pulmonologist for clearance and be rescheduled with consent to convert to open surgery (TANA) if she cannot tolerate tilt test.  She was given opportunity to ask questions and all were addressed.      John Mckay, DO

## 2024-05-15 LAB
GLUCOSE BLDC GLUCOMTR-MCNC: 131 MG/DL — HIGH (ref 70–99)
GLUCOSE BLDC GLUCOMTR-MCNC: 165 MG/DL — HIGH (ref 70–99)
GLUCOSE BLDC GLUCOMTR-MCNC: 96 MG/DL — SIGNIFICANT CHANGE UP (ref 70–99)

## 2024-05-16 RX ORDER — IBUPROFEN 800 MG/1
800 TABLET, FILM COATED ORAL EVERY 8 HOURS
Qty: 90 | Refills: 3 | Status: ACTIVE | COMMUNITY
Start: 2024-05-16 | End: 1900-01-01

## 2024-05-16 RX ORDER — MEDROXYPROGESTERONE ACETATE 10 MG/1
10 TABLET ORAL
Qty: 90 | Refills: 1 | Status: ACTIVE | COMMUNITY
Start: 2024-05-16 | End: 1900-01-01

## 2024-05-22 ENCOUNTER — NON-APPOINTMENT (OUTPATIENT)
Age: 44
End: 2024-05-22

## 2024-07-19 DIAGNOSIS — Z86.79 PERSONAL HISTORY OF OTHER DISEASES OF THE CIRCULATORY SYSTEM: ICD-10-CM

## 2024-07-19 DIAGNOSIS — R03.0 ELEVATED BLOOD-PRESSURE READING, W/OUT DIAGNOSIS OF HYPERTENSION: ICD-10-CM

## 2024-07-19 RX ORDER — NORETHINDRONE ACETATE AND ETHINYL ESTRADIOL AND FERROUS FUMARATE 1.5-30(21)
1.5-3 KIT ORAL
Qty: 4 | Refills: 0 | Status: ACTIVE | COMMUNITY
Start: 2024-07-19 | End: 1900-01-01

## 2024-07-24 ENCOUNTER — APPOINTMENT (OUTPATIENT)
Dept: PULMONOLOGY | Facility: CLINIC | Age: 44
End: 2024-07-24
Payer: COMMERCIAL

## 2024-07-24 VITALS
BODY MASS INDEX: 45.07 KG/M2 | WEIGHT: 264 LBS | DIASTOLIC BLOOD PRESSURE: 82 MMHG | RESPIRATION RATE: 16 BRPM | SYSTOLIC BLOOD PRESSURE: 128 MMHG | OXYGEN SATURATION: 99 % | HEIGHT: 64.25 IN | HEART RATE: 78 BPM

## 2024-07-24 DIAGNOSIS — Z91.09 OTHER ALLERGY STATUS, OTHER THAN TO DRUGS AND BIOLOGICAL SUBSTANCES: ICD-10-CM

## 2024-07-24 DIAGNOSIS — N93.9 ABNORMAL UTERINE AND VAGINAL BLEEDING, UNSPECIFIED: ICD-10-CM

## 2024-07-24 PROBLEM — J45.909 EXTRINSIC ASTHMA, UNSPECIFIED ASTHMA SEVERITY, UNSPECIFIED WHETHER COMPLICATED, UNSPECIFIED WHETHER PERSISTENT: Status: ACTIVE | Noted: 2024-07-24

## 2024-07-24 PROCEDURE — 99204 OFFICE O/P NEW MOD 45 MIN: CPT

## 2024-07-24 PROCEDURE — G2211 COMPLEX E/M VISIT ADD ON: CPT | Mod: NC

## 2024-07-24 RX ORDER — TRANEXAMIC ACID 650 MG/1
650 TABLET ORAL
Qty: 30 | Refills: 0 | Status: DISCONTINUED | COMMUNITY
Start: 2024-07-19 | End: 2024-07-24

## 2024-07-24 RX ORDER — BUDESONIDE AND FORMOTEROL FUMARATE DIHYDRATE 160; 4.5 UG/1; UG/1
160-4.5 AEROSOL RESPIRATORY (INHALATION)
Qty: 3 | Refills: 3 | Status: ACTIVE | COMMUNITY
Start: 2024-07-24 | End: 1900-01-01

## 2024-07-24 RX ORDER — MONTELUKAST 10 MG/1
10 TABLET, FILM COATED ORAL DAILY
Qty: 30 | Refills: 5 | Status: ACTIVE | COMMUNITY
Start: 2024-07-24 | End: 1900-01-01

## 2024-07-24 RX ORDER — ORAL SEMAGLUTIDE 14 MG/1
14 TABLET ORAL
Refills: 0 | Status: ACTIVE | COMMUNITY

## 2024-07-24 RX ORDER — PANTOPRAZOLE 40 MG/1
40 TABLET, DELAYED RELEASE ORAL
Refills: 0 | Status: ACTIVE | COMMUNITY

## 2024-07-24 NOTE — REASON FOR VISIT
[Initial] : an initial visit [Asthma] : asthma [Shortness of Breath] : shortness of breath [Pre-op Risk Stratification] : pre-op risk stratification

## 2024-07-24 NOTE — ASSESSMENT
[FreeTextEntry1] : 44F PMH allergic asthma, environmental allergies, DM, GERD, non-smoker who presents for initial pulmonary evaluation.   - Will check CBC, IgE, [place] allergen panel - Will empirically tx with Montelukast 10mg - she denies psych history - Will rx Symbicort 160 - she can use PRN - Will have her return in the next few weeks for spirometry for pre-operative purposes - She had sleep study in the past few years she reports was negative for EDGAR - After spirometry will provide risk stratification for hysterectomy  The patient expressed understanding and agreement with the plan as outlined above and accepts responsibility to be compliant with any recommended testing, treatment, and follow-up visits.  All relevant questions and concerns were addressed.  45 minutes of time were spent on the encounter. Medical records were reviewed, including but not limited to hospital records, outpatient records, laboratory data, and diagnostic imaging studies. Greater than 50% of the face-to-face encounter time was spent on counseling and/or coordination of care.  Krzysztof Naylor MD, [facility] [place] [address] [place], [place] [address] T: [phone] F: [phone]

## 2024-07-24 NOTE — HISTORY OF PRESENT ILLNESS
[Never] : never [TextBox_4] : 44F PMH allergic asthma, environmental allergies, DM, GERD, non-smoker who presents for initial pulmonary evaluation. She reports having asthma her whole life. She is allergic to dust. She was taken for a hysterectomy in May 2024 but when being laid flat for the surgery had worsening SOB and so procedure had to be cancelled. She is not on any maintenance inhalers or even albuterol since the age of 31.

## 2024-07-27 ENCOUNTER — LABORATORY RESULT (OUTPATIENT)
Age: 44
End: 2024-07-27

## 2024-07-28 LAB
BASOPHILS # BLD AUTO: 0.06 K/UL
BASOPHILS NFR BLD AUTO: 1 %
EOSINOPHIL # BLD AUTO: 0.19 K/UL
EOSINOPHIL NFR BLD AUTO: 3 %
HCT VFR BLD CALC: 39.9 %
HGB BLD-MCNC: 12.6 G/DL
IMM GRANULOCYTES NFR BLD AUTO: 0.3 %
LYMPHOCYTES # BLD AUTO: 1.73 K/UL
LYMPHOCYTES NFR BLD AUTO: 27.7 %
MAN DIFF?: NORMAL
MCHC RBC-ENTMCNC: 28.1 PG
MCHC RBC-ENTMCNC: 31.6 GM/DL
MCV RBC AUTO: 89.1 FL
MONOCYTES # BLD AUTO: 0.42 K/UL
MONOCYTES NFR BLD AUTO: 6.7 %
NEUTROPHILS # BLD AUTO: 3.82 K/UL
NEUTROPHILS NFR BLD AUTO: 61.3 %
PLATELET # BLD AUTO: 274 K/UL
RBC # BLD: 4.48 M/UL
RBC # FLD: 13.2 %
WBC # FLD AUTO: 6.24 K/UL

## 2024-07-31 ENCOUNTER — APPOINTMENT (OUTPATIENT)
Dept: PULMONOLOGY | Facility: CLINIC | Age: 44
End: 2024-07-31
Payer: COMMERCIAL

## 2024-07-31 ENCOUNTER — NON-APPOINTMENT (OUTPATIENT)
Age: 44
End: 2024-07-31

## 2024-07-31 VITALS — WEIGHT: 262 LBS | HEIGHT: 64 IN | BODY MASS INDEX: 44.73 KG/M2

## 2024-07-31 DIAGNOSIS — J45.909 UNSPECIFIED ASTHMA, UNCOMPLICATED: ICD-10-CM

## 2024-07-31 LAB
A ALTERNATA IGE QN: 3.63 KUA/L
A FUMIGATUS IGE QN: 1.06 KUA/L
BASOPHILS # BLD AUTO: 0.09 K/UL
BASOPHILS NFR BLD AUTO: 1.4 %
BERMUDA GRASS IGE QN: 0.12 KUA/L
BOXELDER IGE QN: 0.21 KUA/L
C HERBARUM IGE QN: 0.11 KUA/L
CALIF WALNUT IGE QN: NORMAL
CAT DANDER IGE QN: 2.5 KUA/L
CMN PIGWEED IGE QN: 0.32 KUA/L
COMMON RAGWEED IGE QN: 0.24 KUA/L
COTTONWOOD IGE QN: <0.1 KUA/L
D FARINAE IGE QN: <0.1 KUA/L
D PTERONYSS IGE QN: 0.15 KUA/L
DEPRECATED A ALTERNATA IGE RAST QL: 3 (ref 0–?)
DEPRECATED A FUMIGATUS IGE RAST QL: 2 (ref 0–?)
DEPRECATED BERMUDA GRASS IGE RAST QL: NORMAL (ref 0–?)
DEPRECATED BOXELDER IGE RAST QL: NORMAL (ref 0–?)
DEPRECATED C HERBARUM IGE RAST QL: NORMAL (ref 0–?)
DEPRECATED CAT DANDER IGE RAST QL: 2 (ref 0–?)
DEPRECATED COMMON PIGWEED IGE RAST QL: NORMAL (ref 0–?)
DEPRECATED COMMON RAGWEED IGE RAST QL: NORMAL (ref 0–?)
DEPRECATED COTTONWOOD IGE RAST QL: 0 (ref 0–?)
DEPRECATED D FARINAE IGE RAST QL: 0 (ref 0–?)
DEPRECATED D PTERONYSS IGE RAST QL: NORMAL (ref 0–?)
DEPRECATED DOG DANDER IGE RAST QL: 4 (ref 0–?)
DEPRECATED GOOSEFOOT IGE RAST QL: NORMAL (ref 0–?)
DEPRECATED LONDON PLANE IGE RAST QL: 1 (ref 0–?)
DEPRECATED MOUSE URINE PROT IGE RAST QL: 0 (ref 0–?)
DEPRECATED MUGWORT IGE RAST QL: 2 (ref 0–?)
DEPRECATED P NOTATUM IGE RAST QL: NORMAL (ref 0–?)
DEPRECATED RED CEDAR IGE RAST QL: NORMAL (ref 0–?)
DEPRECATED ROACH IGE RAST QL: NORMAL (ref 0–?)
DEPRECATED SHEEP SORREL IGE RAST QL: 0 (ref 0–?)
DEPRECATED SILVER BIRCH IGE RAST QL: 0 (ref 0–?)
DEPRECATED TIMOTHY IGE RAST QL: 2 (ref 0–?)
DEPRECATED WHITE ASH IGE RAST QL: NORMAL (ref 0–?)
DEPRECATED WHITE OAK IGE RAST QL: NORMAL (ref 0–?)
DOG DANDER IGE QN: 30 KUA/L
EOSINOPHIL # BLD AUTO: 0.15 K/UL
EOSINOPHIL NFR BLD AUTO: 2.4 %
GOOSEFOOT IGE QN: 0.3 KUA/L
HCT VFR BLD CALC: 38.9 %
HGB BLD-MCNC: 12.5 G/DL
IMM GRANULOCYTES NFR BLD AUTO: 0.6 %
LONDON PLANE IGE QN: 0.38 KUA/L
LYMPHOCYTES # BLD AUTO: 1.69 K/UL
LYMPHOCYTES NFR BLD AUTO: 26.7 %
MAN DIFF?: NORMAL
MCHC RBC-ENTMCNC: 28.7 PG
MCHC RBC-ENTMCNC: 32.1 GM/DL
MCV RBC AUTO: 89.2 FL
MONOCYTES # BLD AUTO: 0.5 K/UL
MONOCYTES NFR BLD AUTO: 7.9 %
MOUSE URINE PROT IGE QN: <0.1 KUA/L
MUGWORT IGE QN: 0.84 KUA/L
MULBERRY (T70) CLASS: 0 (ref 0–?)
MULBERRY (T70) CONC: <0.1 KUA/L
NEUTROPHILS # BLD AUTO: 3.86 K/UL
NEUTROPHILS NFR BLD AUTO: 61 %
P NOTATUM IGE QN: 0.14 KUA/L
PLATELET # BLD AUTO: 293 K/UL
RBC # BLD: 4.36 M/UL
RBC # FLD: 13.2 %
RED CEDAR IGE QN: 0.22 KUA/L
ROACH IGE QN: 0.14 KUA/L
SHEEP SORREL IGE QN: <0.1 KUA/L
SILVER BIRCH IGE QN: <0.1 KUA/L
TIMOTHY IGE QN: 2.9 KUA/L
TOTAL IGE SMQN RAST: 741 KU/L
TREE ALLERG MIX1 IGE QL: NORMAL (ref 0–?)
WBC # FLD AUTO: 6.33 K/UL
WHITE ASH IGE QN: 0.2 KUA/L
WHITE ELM IGE QN: 0.35 KUA/L
WHITE ELM IGE QN: 1 (ref 0–?)
WHITE OAK IGE QN: NORMAL

## 2024-07-31 PROCEDURE — 94010 BREATHING CAPACITY TEST: CPT

## 2024-08-05 ENCOUNTER — APPOINTMENT (OUTPATIENT)
Dept: PULMONOLOGY | Facility: CLINIC | Age: 44
End: 2024-08-05

## 2024-09-05 ENCOUNTER — OUTPATIENT (OUTPATIENT)
Dept: OUTPATIENT SERVICES | Facility: HOSPITAL | Age: 44
LOS: 1 days | End: 2024-09-05
Payer: COMMERCIAL

## 2024-09-05 VITALS
OXYGEN SATURATION: 98 % | HEART RATE: 88 BPM | SYSTOLIC BLOOD PRESSURE: 120 MMHG | HEIGHT: 66 IN | DIASTOLIC BLOOD PRESSURE: 82 MMHG | RESPIRATION RATE: 18 BRPM | TEMPERATURE: 98 F | WEIGHT: 258.6 LBS

## 2024-09-05 DIAGNOSIS — Z98.51 TUBAL LIGATION STATUS: Chronic | ICD-10-CM

## 2024-09-05 DIAGNOSIS — Z98.890 OTHER SPECIFIED POSTPROCEDURAL STATES: Chronic | ICD-10-CM

## 2024-09-05 DIAGNOSIS — K21.9 GASTRO-ESOPHAGEAL REFLUX DISEASE WITHOUT ESOPHAGITIS: ICD-10-CM

## 2024-09-05 DIAGNOSIS — J45.909 UNSPECIFIED ASTHMA, UNCOMPLICATED: ICD-10-CM

## 2024-09-05 DIAGNOSIS — Z01.818 ENCOUNTER FOR OTHER PREPROCEDURAL EXAMINATION: ICD-10-CM

## 2024-09-05 DIAGNOSIS — Z90.49 ACQUIRED ABSENCE OF OTHER SPECIFIED PARTS OF DIGESTIVE TRACT: Chronic | ICD-10-CM

## 2024-09-05 DIAGNOSIS — N93.9 ABNORMAL UTERINE AND VAGINAL BLEEDING, UNSPECIFIED: ICD-10-CM

## 2024-09-05 DIAGNOSIS — N94.6 DYSMENORRHEA, UNSPECIFIED: ICD-10-CM

## 2024-09-05 DIAGNOSIS — N92.1 EXCESSIVE AND FREQUENT MENSTRUATION WITH IRREGULAR CYCLE: ICD-10-CM

## 2024-09-05 DIAGNOSIS — Z98.891 HISTORY OF UTERINE SCAR FROM PREVIOUS SURGERY: Chronic | ICD-10-CM

## 2024-09-05 DIAGNOSIS — E11.9 TYPE 2 DIABETES MELLITUS WITHOUT COMPLICATIONS: ICD-10-CM

## 2024-09-05 LAB
A1C WITH ESTIMATED AVERAGE GLUCOSE RESULT: 6.7 % — HIGH (ref 4–5.6)
ALBUMIN SERPL ELPH-MCNC: 4 G/DL — SIGNIFICANT CHANGE UP (ref 3.3–5.2)
ALLERGY+IMMUNOLOGY DIAG STUDY NOTE: SIGNIFICANT CHANGE UP
ALP SERPL-CCNC: 84 U/L — SIGNIFICANT CHANGE UP (ref 40–120)
ALT FLD-CCNC: 10 U/L — SIGNIFICANT CHANGE UP
ANION GAP SERPL CALC-SCNC: 12 MMOL/L — SIGNIFICANT CHANGE UP (ref 5–17)
APTT BLD: 33.6 SEC — SIGNIFICANT CHANGE UP (ref 24.5–35.6)
AST SERPL-CCNC: 14 U/L — SIGNIFICANT CHANGE UP
BASOPHILS # BLD AUTO: 0.04 K/UL — SIGNIFICANT CHANGE UP (ref 0–0.2)
BASOPHILS NFR BLD AUTO: 0.5 % — SIGNIFICANT CHANGE UP (ref 0–2)
BILIRUB SERPL-MCNC: 0.3 MG/DL — LOW (ref 0.4–2)
BLD GP AB SCN SERPL QL: SIGNIFICANT CHANGE UP
BUN SERPL-MCNC: 13.6 MG/DL — SIGNIFICANT CHANGE UP (ref 8–20)
CALCIUM SERPL-MCNC: 9 MG/DL — SIGNIFICANT CHANGE UP (ref 8.4–10.5)
CHLORIDE SERPL-SCNC: 103 MMOL/L — SIGNIFICANT CHANGE UP (ref 96–108)
CO2 SERPL-SCNC: 25 MMOL/L — SIGNIFICANT CHANGE UP (ref 22–29)
CREAT SERPL-MCNC: 0.59 MG/DL — SIGNIFICANT CHANGE UP (ref 0.5–1.3)
DIR ANTIGLOB POLYSPECIFIC INTERPRETATION: SIGNIFICANT CHANGE UP
EGFR: 115 ML/MIN/1.73M2 — SIGNIFICANT CHANGE UP
EOSINOPHIL # BLD AUTO: 0.12 K/UL — SIGNIFICANT CHANGE UP (ref 0–0.5)
EOSINOPHIL NFR BLD AUTO: 1.6 % — SIGNIFICANT CHANGE UP (ref 0–6)
ESTIMATED AVERAGE GLUCOSE: 146 MG/DL — HIGH (ref 68–114)
GLUCOSE SERPL-MCNC: 169 MG/DL — HIGH (ref 70–99)
HCG SERPL-ACNC: <4 MIU/ML — SIGNIFICANT CHANGE UP
HCT VFR BLD CALC: 39.6 % — SIGNIFICANT CHANGE UP (ref 34.5–45)
HGB BLD-MCNC: 13 G/DL — SIGNIFICANT CHANGE UP (ref 11.5–15.5)
IMM GRANULOCYTES NFR BLD AUTO: 0.4 % — SIGNIFICANT CHANGE UP (ref 0–0.9)
INR BLD: 1.01 RATIO — SIGNIFICANT CHANGE UP (ref 0.85–1.18)
LYMPHOCYTES # BLD AUTO: 1.78 K/UL — SIGNIFICANT CHANGE UP (ref 1–3.3)
LYMPHOCYTES # BLD AUTO: 23.5 % — SIGNIFICANT CHANGE UP (ref 13–44)
MCHC RBC-ENTMCNC: 28 PG — SIGNIFICANT CHANGE UP (ref 27–34)
MCHC RBC-ENTMCNC: 32.8 GM/DL — SIGNIFICANT CHANGE UP (ref 32–36)
MCV RBC AUTO: 85.3 FL — SIGNIFICANT CHANGE UP (ref 80–100)
MONOCYTES # BLD AUTO: 0.49 K/UL — SIGNIFICANT CHANGE UP (ref 0–0.9)
MONOCYTES NFR BLD AUTO: 6.5 % — SIGNIFICANT CHANGE UP (ref 2–14)
NEUTROPHILS # BLD AUTO: 5.1 K/UL — SIGNIFICANT CHANGE UP (ref 1.8–7.4)
NEUTROPHILS NFR BLD AUTO: 67.5 % — SIGNIFICANT CHANGE UP (ref 43–77)
PLATELET # BLD AUTO: 273 K/UL — SIGNIFICANT CHANGE UP (ref 150–400)
POTASSIUM SERPL-MCNC: 3.9 MMOL/L — SIGNIFICANT CHANGE UP (ref 3.5–5.3)
POTASSIUM SERPL-SCNC: 3.9 MMOL/L — SIGNIFICANT CHANGE UP (ref 3.5–5.3)
PROT SERPL-MCNC: 6.9 G/DL — SIGNIFICANT CHANGE UP (ref 6.6–8.7)
PROTHROM AB SERPL-ACNC: 11.2 SEC — SIGNIFICANT CHANGE UP (ref 9.5–13)
RBC # BLD: 4.64 M/UL — SIGNIFICANT CHANGE UP (ref 3.8–5.2)
RBC # FLD: 12.9 % — SIGNIFICANT CHANGE UP (ref 10.3–14.5)
SODIUM SERPL-SCNC: 140 MMOL/L — SIGNIFICANT CHANGE UP (ref 135–145)
WBC # BLD: 7.56 K/UL — SIGNIFICANT CHANGE UP (ref 3.8–10.5)
WBC # FLD AUTO: 7.56 K/UL — SIGNIFICANT CHANGE UP (ref 3.8–10.5)

## 2024-09-05 PROCEDURE — 86077 PHYS BLOOD BANK SERV XMATCH: CPT

## 2024-09-05 PROCEDURE — 93010 ELECTROCARDIOGRAM REPORT: CPT

## 2024-09-05 RX ORDER — CELECOXIB 200 MG/1
400 CAPSULE ORAL ONCE
Refills: 0 | Status: COMPLETED | OUTPATIENT
Start: 2024-09-23 | End: 2024-09-23

## 2024-09-05 RX ORDER — GENTAMICIN 10 MG/ML
300 INJECTION, SOLUTION INTRAMUSCULAR; INTRAVENOUS ONCE
Refills: 0 | Status: DISCONTINUED | OUTPATIENT
Start: 2024-09-23 | End: 2024-09-24

## 2024-09-05 RX ORDER — ACETAMINOPHEN 325 MG
975 TABLET ORAL ONCE
Refills: 0 | Status: COMPLETED | OUTPATIENT
Start: 2024-09-23 | End: 2024-09-23

## 2024-09-05 RX ORDER — SODIUM CHLORIDE 0.9 % (FLUSH) 0.9 %
3 SYRINGE (ML) INJECTION ONCE
Refills: 0 | Status: DISCONTINUED | OUTPATIENT
Start: 2024-09-23 | End: 2024-09-23

## 2024-09-05 RX ORDER — VANCOMYCIN HCL-SODIUM CHLORIDE IV SOLN 1.5 GM/250ML-0.9% 1.5-0.9/25 GM/ML-%
1750 SOLUTION INTRAVENOUS ONCE
Refills: 0 | Status: COMPLETED | OUTPATIENT
Start: 2024-09-23 | End: 2024-09-23

## 2024-09-05 NOTE — H&P PST ADULT - CARDIOVASCULAR
negative normal/regular rate and rhythm/S1 S2 present/no gallops/no rub/no murmur/no JVD/no pedal edema/murmur

## 2024-09-05 NOTE — H&P PST ADULT - ASSESSMENT
Patient educated on surgical scrub, preadmission instructions, medical clearance and day of procedure medications, pt. verbalizes understanding and agreement.   ERP reviewed. Pt. to have medical clearance with  and pt. verbalized agreement and understanding. Pt. to have medical clearance with  and pt. verbalized agreement and understanding.  43 year old female with  PMH DM2, mild asthma, and GERD here for PST with history of abnormal uterine bleeding, dysmenorrhea, menometrorrhagia. Patient states she menstruated 71 days in a row over past summer. She reports undergoing a laparoscopic cholecystectomy in 2024 and recovered well. Patient was scheduled for hysterectomy 2024 but was unable to complete procedure due to respiratory function issues intubated when patient was inverted. Her menstrual period has been heavy in the past, but became twice is heavy after delivery of her son in . [id number], children age 2 and 5. She is through childbearing and ended in a definitive surgical procedure, Obstetrical history of 2 prior  sections, the last with bilateral salpingectomy for permanent sterilization on 2022. Today she feels well, Denies CP/SOB, no subjective fevers or chills.  She is scheduled for Robotic assisted Total Laparoscopic Hysterectomy possible bilateral salpingo-oophorectomy possible exploratory laparotomy by dr. Mckay on 2024. Patient educated on surgical scrub, preadmission instructions, medical clearance and day of procedure medications, pt. verbalizes understanding and agreement.   ERP reviewed. Pt. to have medical clearance with Dr. Schafer and pt. verbalized agreement and understanding. Pt. to have pulm clearance with Dr. Naylor  and pt. verbalized agreement and understanding.       CAPRINI SCORE    AGE RELATED RISK FACTORS                                                             [ x] Age 41-60 years                                            (1 Point)  [ ] Age: 61-74 years                                           (2 Points)                 [ ] Age= 75 years                                                (3 Points)             DISEASE RELATED RISK FACTORS                                                       [ ] Edema in the lower extremities                 (1 Point)                     [ ] Varicose veins                                               (1 Point)                                 [ x] BMI > 25 Kg/m2                                            (1 Point)                                  [ ] Serious infection (ie PNA)                            (1 Point)                     [ ] Lung disease ( COPD, Emphysema)            (1 Point)                                                                          [ ] Acute myocardial infarction                         (1 Point)                  [ ] Congestive heart failure (in the previous month)  (1 Point)         [ ] Inflammatory bowel disease                            (1 Point)                  [ ] Central venous access, PICC or Port               (2 points)       (within the last month)                                                                [ ] Stroke (in the previous month)                        (5 Points)    [ ] Previous or present malignancy                       (2 points)                                                                                                                                                         HEMATOLOGY RELATED FACTORS                                                         [ ] Prior episodes of VTE                                     (3 Points)                     [ ] Positive family history for VTE                      (3 Points)                  [ ] Prothrombin 52223 A                                     (3 Points)                     [ ] Factor V Leiden                                                (3 Points)                        [ ] Lupus anticoagulants                                      (3 Points)                                                           [ ] Anticardiolipin antibodies                              (3 Points)                                                       [ ] High homocysteine in the blood                   (3 Points)                                             [ ] Other congenital or acquired thrombophilia      (3 Points)                                                [ ] Heparin induced thrombocytopenia                  (3 Points)                                        MOBILITY RELATED FACTORS  [ ] Bed rest                                                         (1 Point)  [ ] Plaster cast                                                    (2 points)  [ ] Bed bound for more than 72 hours           (2 Points)    GENDER SPECIFIC FACTORS  [ ] Pregnancy or had a baby within the last month   (1 Point)  [ ] Post-partum < 6 weeks                                   (1 Point)  [ ] Hormonal therapy  or oral contraception   (1 Point)  [ ] History of pregnancy complications              (1 point)  [ ] Unexplained or recurrent              (1 Point)    OTHER RISK FACTORS                                           (1 Point)  [ x] BMI >40, smoking, diabetes requiring insulin, chemotherapy  blood transfusions and length of surgery over 2 hours    SURGERY RELATED RISK FACTORS  [ ]  Section within the last month     (1 Point)  [ ] Minor surgery                                                  (1 Point)  [ ] Arthroscopic surgery                                       (2 Points)  [x ] Planned major surgery lasting more            (2 Points)      than 45 minutes     [ ] Elective hip or knee joint replacement       (5 points)       surgery                                                TRAUMA RELATED RISK FACTORS  [ ] Fracture of the hip, pelvis, or leg                       (5 Points)  [ ] Spinal cord injury resulting in paralysis             (5 points)       (in the previous month)    [ ] Paralysis  (less than 1 month)                             (5 Points)  [ ] Multiple Trauma within 1 month                        (5 Points)    Total Score [    5    ]    Caprini Score 0-2: Low Risk, NO VTE prophylaxis required for most patients, encourage ambulation  Caprini Score 3-6: Moderate Risk , pharmacologic VTE prophylaxis is indicated for most patients (in the absence of contraindications)  Caprini Score Greater than or =7: High risk, pharmocologic VTE prophylaxis indicated for most patients (in the absence of contraindications)    OPIOID RISK TOOL    JENNIFER EACH BOX THAT APPLIES AND ADD TOTALS AT THE END    FAMILY HISTORY OF SUBSTANCE ABUSE                 FEMALE         MALE                                                Alcohol                             [  ]1 pt          [  ]3pts                                               Illegal Durgs                     [  ]2 pts        [  ]3pts                                               Rx Drugs                           [  ]4 pts        [  ]4 pts    PERSONAL HISTORY OF SUBSTANCE ABUSE                                                                                          Alcohol                             [  ]3 pts       [  ]3 pts                                               Illegal Drugs                     [  ]4 pts        [  ]4 pts                                               Rx Drugs                           [  ]5 pts        [  ]5 pts    AGE BETWEEN 16-45 YEARS                                      [ x ]1 pt         [  ]1 pt    HISTORY OF PREADOLESCENT   SEXUAL ABUSE                                                             [  ]3 pts        [  ]0pts    PSYCHOLOGICAL DISEASE                     ADD, OCD, Bipolar, Schizophrenia        [  ]2 pts         [  ]2 pts                      Depression                                               [  ]1 pt           [  ]1 pt           SCORING TOTAL   (add numbers and type here)              (**1*)                                     A score of 3 or lower indicated LOW risk for future opioid abuse  A score of 4 to 7 indicated moderate risk for future opioid abuse  A score of 8 or higher indicates a high risk for opioid abuse

## 2024-09-05 NOTE — H&P PST ADULT - PROBLEM SELECTOR PLAN 6
Patient to take RYBELSUS up to day prior to procedure but to hold am dose DOS.   Patient told to hold TOUJEO DOS.   Patient told to discuss dm medications with provider prior to procedure.   Hemoglobin A1C pending.   F/S ordered DOS.   Medical clearance pending.

## 2024-09-05 NOTE — H&P PST ADULT - NSANTHGENDERRD_ENT_A_CORE
I called and spoke with the patient on 9/30/2022 and reviewed his blood work  I advised him that he has multiple abnormalities  His GGT and bilirubin are very high and his WBC is very high and the neutrophils are elevated  His potassium is low- he states the he is feeling very poor and weak and in bed- I advised him that he should be going to the ER to be seen and should call 911- he is refusing at this time and is aware of the risks  He is refusing to go by ambulance and wants to wait till later in the weekend when he gets a ride  I advised him that this is a bad decision, and he is aware  He does have a dental abscess- which could be a source of the elevated WBC count  I advised him that we will at least try to replete the potasium and will cover him with the Augmentin and I am going to change the US order to a CT scan - he is going to strongly consider going to the ER  No

## 2024-09-05 NOTE — H&P PST ADULT - BP NONINVASIVE SYSTOLIC (MM HG)
[facility]        NAME: Percy Kumar is a 19 y.o. male  : 2004    MRN: [id number]  DATE: 2023  TIME: 2:24 PM    Assessment and Plan   Subacute cough [R05.2]  1. Subacute cough  XR chest pa & lateral    benzonatate (TESSALON) 200 MG capsule    albuterol (ProAir HFA) 90 mcg/act inhaler        Chest x-ray reviewed, no acute cardiopulmonary disease identified, pending final read per radiology.    Patient Instructions     Please trial Tessalon every 8 hours as needed for cough.   Please trial Albuterol every 6 hours as needed for chest tightness.   Continue with OTC cough and cold medication.   Drink plenty of fluids.   Follow up with PCP in 3-5 days.  Proceed to  ER if symptoms worsen.    Chief Complaint     Chief Complaint   Patient presents with    Cough     Patient states that he has had an ongoing problem of productive cough since the end of October. He has been seen multiple times for it but it has not gone away. He was prescribed prednisone and he was unable to take it due to chest pain from the medication. He denies any chest pain or SOB at this time.          History of Present Illness       19-year-old male presenting for evaluation of subacute cough.  Patient states that he was ill with a URI in November.  He states that since being ill he has been experiencing a lingering cough.  He went to the ER for the cough and was treated with prednisone.  He states that he took 2 days of the medication and developed chest pain, and for this reason he discontinued the medication.  He denies any other current treatment other than Tylenol.  He denies any chest pain, shortness of breath, fevers or chills.    Cough  Pertinent negatives include no chest pain, chills, fever or shortness of breath.       Review of Systems   Review of Systems   Constitutional:  Negative for chills and fever.   Respiratory:  Positive for cough. Negative for shortness of breath.    Cardiovascular:  Negative for  chest pain.   Gastrointestinal:  Negative for diarrhea, nausea and vomiting.   All other systems reviewed and are negative.        Current Medications       Current Outpatient Medications:     albuterol (ProAir HFA) 90 mcg/act inhaler, Inhale 2 puffs every 6 (six) hours as needed for wheezing, Disp: 8.5 g, Rfl: 0    benzonatate (TESSALON) 200 MG capsule, Take 1 capsule (200 mg total) by mouth 3 (three) times a day as needed for cough, Disp: 20 capsule, Rfl: 0    ondansetron (ZOFRAN) 4 mg tablet, Take 1 tablet by mouth every 6 (six) hours (Patient not taking: Reported on 12/29/2023), Disp: 12 tablet, Rfl: 0    Current Allergies     Allergies as of 12/29/2023    (No Known Allergies)            The following portions of the patient's history were reviewed and updated as appropriate: allergies, current medications, past family history, past medical history, past social history, past surgical history and problem list.     Past Medical History:   Diagnosis Date    Asthma        History reviewed. No pertinent surgical history.    History reviewed. No pertinent family history.      Medications have been verified.        Objective   /80   Pulse 65   Temp 98.1 °F (36.7 °C)   Resp 18   SpO2 98%        Physical Exam     Physical Exam  Vitals and nursing note reviewed.   Constitutional:       General: He is not in acute distress.     Appearance: Normal appearance. He is normal weight. He is not ill-appearing.   HENT:      Head: Normocephalic and atraumatic.      Right Ear: Tympanic membrane normal.      Left Ear: Tympanic membrane normal.      Nose: Nose normal. No congestion or rhinorrhea.      Mouth/Throat:      Mouth: Mucous membranes are moist.      Pharynx: Oropharynx is clear. No oropharyngeal exudate or posterior oropharyngeal erythema.   Eyes:      General:         Right eye: No discharge.         Left eye: No discharge.   Cardiovascular:      Rate and Rhythm: Normal rate and regular rhythm.      Pulses: Normal  pulses.      Heart sounds: Normal heart sounds. No murmur heard.     No friction rub. No gallop.   Pulmonary:      Effort: Pulmonary effort is normal. No respiratory distress.      Breath sounds: Normal breath sounds. No stridor. No wheezing, rhonchi or rales.   Abdominal:      General: Bowel sounds are normal.      Palpations: Abdomen is soft.      Tenderness: There is no abdominal tenderness.   Skin:     General: Skin is warm and dry.   Neurological:      Mental Status: He is alert.   Psychiatric:         Mood and Affect: Mood normal.         Behavior: Behavior normal.                    No 120

## 2024-09-05 NOTE — H&P PST ADULT - NSICDXPASTSURGICALHX_GEN_ALL_CORE_FT
PAST SURGICAL HISTORY:  H/O  section x 1 (2019)    H/O exploratory laparotomy     H/O knee surgery     H/O lumpectomy     History of bilateral ligation of fallopian tubes     History of cholecystectomy     History of colposcopy 4 times    History of removal of cyst removed 8 cysts from head

## 2024-09-05 NOTE — H&P PST ADULT - GASTROINTESTINAL
normal/soft/nontender/nondistended/normal active bowel sounds negative normal/soft/nontender/nondistended/normal active bowel sounds/no guarding/no rigidity/no organomegaly/no palpable barbara/no masses palpable

## 2024-09-05 NOTE — H&P PST ADULT - ATTENDING COMMENTS
Risks, benefits, and alternatives of Examination Under Anesthesia, Robotic Assisted Total Laparoscopic Hysterectomy, Possible Bilateral Salpingo-oophorectomy, Possible Exploratory Laparotomy and Any Indicated Procedures discussed at length, including risk of injury to adjacent organs, such as the bladder, bowel, and bilateral ureters, risk of infection, and risk of hemorrhage, which may lead to subsequent intervention and possible blood transfusion.  She has no Moravian or personal objection to blood transfusion, if necessary.  She was given the opportunity to ask questions and all were addressed.  She understands the plan of care.    John Mckay, DO

## 2024-09-05 NOTE — H&P PST ADULT - HISTORY OF PRESENT ILLNESS
43 year old female with  PMH DM2, mild asthma, and GERD here for PST with history of abnormal uterine bleeding, dysmenorrhea, menometrorrhagia. [id number], age 3 and 6. She reports undergoing a laparoscopic cholecystectomy in 2024 and recovered well. Her menstrual period was heavy in the past, but became twice is heavy after delivery of her son in , She is through childbearing and ended in a definitive surgical procedure, Obstetrical history of 2 prior  sections, the last with bilateral salpingectomy for permanent sterilization on 2022. Today she feels well, Denies CP/SOB, no subjective fevers or chills. Patient was cancelled 2024 due to respiratory function when intubating and when inverted. She is scheduled for Robotic assisted Total Laparoscopic Hysterectomy possible bilateral salpingo-oophorectomy possible exploratory laparotomy by dr. Mckay on 2024.  Medical Clearance and pulm clearance pending   mestruation lasting 2024-2024.  43 year old female with  PMH DM2, mild asthma, and GERD here for PST with history of abnormal uterine bleeding, dysmenorrhea, menometrorrhagia. Patient states she menstruated 71 days in a row over past summer. She reports undergoing a laparoscopic cholecystectomy in 2024 and recovered well. Patient was scheduled for hysterectomy 2024 but was unable to complete procedure due to respiratory function issues intubated when patient was inverted. Her menstrual period has been heavy in the past, but became twice is heavy after delivery of her son in . [id number], children age 2 and 5. She is through childbearing and ended in a definitive surgical procedure, Obstetrical history of 2 prior  sections, the last with bilateral salpingectomy for permanent sterilization on 2022. Today she feels well, Denies CP/SOB, no subjective fevers or chills.  She is scheduled for Robotic assisted Total Laparoscopic Hysterectomy possible bilateral salpingo-oophorectomy possible exploratory laparotomy by dr. Mckay on 2024.  Medical Clearance and pulm clearance pending

## 2024-09-05 NOTE — H&P PST ADULT - PROBLEM SELECTOR PLAN 4
Patient to continue Inhaler as usual up to day of procedure and told to bring inhaler with her to [facility]. Pulm clearance pending. Patient reports issues 5/2024 when she was scheduled r/t being inverted while intubated and procedure was cancelled.

## 2024-09-05 NOTE — H&P PST ADULT - PROBLEM SELECTOR PLAN 3
She is scheduled for Robotic assisted Total Laparoscopic Hysterectomy possible bilateral salpingo-oophorectomy possible exploratory laparotomy by dr. Mckay on 9/18/2024.

## 2024-09-22 PROCEDURE — 80053 COMPREHEN METABOLIC PANEL: CPT

## 2024-09-22 PROCEDURE — 85730 THROMBOPLASTIN TIME PARTIAL: CPT

## 2024-09-22 PROCEDURE — 36415 COLL VENOUS BLD VENIPUNCTURE: CPT

## 2024-09-22 PROCEDURE — 86901 BLOOD TYPING SEROLOGIC RH(D): CPT

## 2024-09-22 PROCEDURE — 86850 RBC ANTIBODY SCREEN: CPT

## 2024-09-22 PROCEDURE — G0463: CPT

## 2024-09-22 PROCEDURE — 86900 BLOOD TYPING SEROLOGIC ABO: CPT

## 2024-09-22 PROCEDURE — 86870 RBC ANTIBODY IDENTIFICATION: CPT

## 2024-09-22 PROCEDURE — 85025 COMPLETE CBC W/AUTO DIFF WBC: CPT

## 2024-09-22 PROCEDURE — 85610 PROTHROMBIN TIME: CPT

## 2024-09-22 PROCEDURE — 86902 BLOOD TYPE ANTIGEN DONOR EA: CPT

## 2024-09-22 PROCEDURE — 84702 CHORIONIC GONADOTROPIN TEST: CPT

## 2024-09-22 PROCEDURE — 83036 HEMOGLOBIN GLYCOSYLATED A1C: CPT

## 2024-09-22 PROCEDURE — 86880 COOMBS TEST DIRECT: CPT

## 2024-09-22 PROCEDURE — 93005 ELECTROCARDIOGRAM TRACING: CPT

## 2024-09-22 PROCEDURE — 86922 COMPATIBILITY TEST ANTIGLOB: CPT

## 2024-09-23 ENCOUNTER — APPOINTMENT (OUTPATIENT)
Dept: OBGYN | Facility: HOSPITAL | Age: 44
End: 2024-09-23

## 2024-09-23 ENCOUNTER — TRANSCRIPTION ENCOUNTER (OUTPATIENT)
Age: 44
End: 2024-09-23

## 2024-09-23 ENCOUNTER — INPATIENT (INPATIENT)
Facility: HOSPITAL | Age: 44
LOS: 0 days | Discharge: HOME | DRG: 761 | End: 2024-09-24
Attending: OBSTETRICS & GYNECOLOGY | Admitting: OBSTETRICS & GYNECOLOGY
Payer: COMMERCIAL

## 2024-09-23 ENCOUNTER — RESULT REVIEW (OUTPATIENT)
Age: 44
End: 2024-09-23

## 2024-09-23 VITALS
DIASTOLIC BLOOD PRESSURE: 97 MMHG | HEART RATE: 93 BPM | HEIGHT: 66 IN | WEIGHT: 255.96 LBS | TEMPERATURE: 98 F | RESPIRATION RATE: 16 BRPM | SYSTOLIC BLOOD PRESSURE: 132 MMHG | OXYGEN SATURATION: 96 %

## 2024-09-23 DIAGNOSIS — Z98.890 OTHER SPECIFIED POSTPROCEDURAL STATES: Chronic | ICD-10-CM

## 2024-09-23 DIAGNOSIS — N92.1 EXCESSIVE AND FREQUENT MENSTRUATION WITH IRREGULAR CYCLE: ICD-10-CM

## 2024-09-23 DIAGNOSIS — Z98.51 TUBAL LIGATION STATUS: Chronic | ICD-10-CM

## 2024-09-23 DIAGNOSIS — N93.9 ABNORMAL UTERINE AND VAGINAL BLEEDING, UNSPECIFIED: ICD-10-CM

## 2024-09-23 DIAGNOSIS — Z98.891 HISTORY OF UTERINE SCAR FROM PREVIOUS SURGERY: Chronic | ICD-10-CM

## 2024-09-23 DIAGNOSIS — I48.91 UNSPECIFIED ATRIAL FIBRILLATION: ICD-10-CM

## 2024-09-23 DIAGNOSIS — Z90.49 ACQUIRED ABSENCE OF OTHER SPECIFIED PARTS OF DIGESTIVE TRACT: Chronic | ICD-10-CM

## 2024-09-23 DIAGNOSIS — N94.6 DYSMENORRHEA, UNSPECIFIED: ICD-10-CM

## 2024-09-23 LAB
ANION GAP SERPL CALC-SCNC: 10 MMOL/L — SIGNIFICANT CHANGE UP (ref 5–17)
BUN SERPL-MCNC: 8.5 MG/DL — SIGNIFICANT CHANGE UP (ref 8–20)
CALCIUM SERPL-MCNC: 9 MG/DL — SIGNIFICANT CHANGE UP (ref 8.4–10.5)
CHLORIDE SERPL-SCNC: 101 MMOL/L — SIGNIFICANT CHANGE UP (ref 96–108)
CK SERPL-CCNC: 61 U/L — SIGNIFICANT CHANGE UP (ref 25–170)
CO2 SERPL-SCNC: 25 MMOL/L — SIGNIFICANT CHANGE UP (ref 22–29)
CREAT SERPL-MCNC: 0.7 MG/DL — SIGNIFICANT CHANGE UP (ref 0.5–1.3)
EGFR: 110 ML/MIN/1.73M2 — SIGNIFICANT CHANGE UP
GLUCOSE BLDC GLUCOMTR-MCNC: 141 MG/DL — HIGH (ref 70–99)
GLUCOSE BLDC GLUCOMTR-MCNC: 152 MG/DL — HIGH (ref 70–99)
GLUCOSE BLDC GLUCOMTR-MCNC: 184 MG/DL — HIGH (ref 70–99)
GLUCOSE SERPL-MCNC: 148 MG/DL — HIGH (ref 70–99)
HCG UR QL: NEGATIVE — SIGNIFICANT CHANGE UP
HCT VFR BLD CALC: 38.6 % — SIGNIFICANT CHANGE UP (ref 34.5–45)
HGB BLD-MCNC: 13.3 G/DL — SIGNIFICANT CHANGE UP (ref 11.5–15.5)
MAGNESIUM SERPL-MCNC: 1.9 MG/DL — SIGNIFICANT CHANGE UP (ref 1.6–2.6)
MCHC RBC-ENTMCNC: 29.1 PG — SIGNIFICANT CHANGE UP (ref 27–34)
MCHC RBC-ENTMCNC: 34.5 GM/DL — SIGNIFICANT CHANGE UP (ref 32–36)
MCV RBC AUTO: 84.5 FL — SIGNIFICANT CHANGE UP (ref 80–100)
PHOSPHATE SERPL-MCNC: 3 MG/DL — SIGNIFICANT CHANGE UP (ref 2.4–4.7)
PLATELET # BLD AUTO: 274 K/UL — SIGNIFICANT CHANGE UP (ref 150–400)
POTASSIUM SERPL-MCNC: 3.8 MMOL/L — SIGNIFICANT CHANGE UP (ref 3.5–5.3)
POTASSIUM SERPL-SCNC: 3.8 MMOL/L — SIGNIFICANT CHANGE UP (ref 3.5–5.3)
RBC # BLD: 4.57 M/UL — SIGNIFICANT CHANGE UP (ref 3.8–5.2)
RBC # FLD: 13.1 % — SIGNIFICANT CHANGE UP (ref 10.3–14.5)
SODIUM SERPL-SCNC: 136 MMOL/L — SIGNIFICANT CHANGE UP (ref 135–145)
T3 SERPL-MCNC: 111 NG/DL — SIGNIFICANT CHANGE UP (ref 80–200)
T4 AB SER-ACNC: 9 UG/DL — SIGNIFICANT CHANGE UP (ref 4.5–12)
T4 FREE SERPL-MCNC: 1.5 NG/DL — SIGNIFICANT CHANGE UP (ref 0.9–1.8)
TROPONIN T, HIGH SENSITIVITY RESULT: 14 NG/L — SIGNIFICANT CHANGE UP (ref 0–51)
TSH SERPL-MCNC: 2.74 UIU/ML — SIGNIFICANT CHANGE UP (ref 0.27–4.2)
WBC # BLD: 8.06 K/UL — SIGNIFICANT CHANGE UP (ref 3.8–10.5)
WBC # FLD AUTO: 8.06 K/UL — SIGNIFICANT CHANGE UP (ref 3.8–10.5)

## 2024-09-23 PROCEDURE — 71045 X-RAY EXAM CHEST 1 VIEW: CPT | Mod: 26

## 2024-09-23 PROCEDURE — 93010 ELECTROCARDIOGRAM REPORT: CPT

## 2024-09-23 PROCEDURE — 93306 TTE W/DOPPLER COMPLETE: CPT | Mod: 26

## 2024-09-23 RX ORDER — GLUCAGON INJECTION, SOLUTION 0.5 MG/.1ML
1 INJECTION, SOLUTION SUBCUTANEOUS ONCE
Refills: 0 | Status: DISCONTINUED | OUTPATIENT
Start: 2024-09-23 | End: 2024-09-24

## 2024-09-23 RX ORDER — IBUPROFEN 600 MG
1 TABLET ORAL
Refills: 0 | DISCHARGE

## 2024-09-23 RX ORDER — SODIUM CHLORIDE IRRIG SOLUTION 0.9 %
1000 SOLUTION, IRRIGATION IRRIGATION
Refills: 0 | Status: DISCONTINUED | OUTPATIENT
Start: 2024-09-23 | End: 2024-09-24

## 2024-09-23 RX ORDER — DILTIAZEM HCL 300 MG
10 CAPSULE, EXTENDED RELEASE 24HR ORAL ONCE
Refills: 0 | Status: COMPLETED | OUTPATIENT
Start: 2024-09-23 | End: 2024-09-23

## 2024-09-23 RX ORDER — FENTANYL CITRATE-0.9 % NACL/PF 300MCG/30
50 PATIENT CONTROLLED ANALGESIA VIAL INJECTION ONCE
Refills: 0 | Status: DISCONTINUED | OUTPATIENT
Start: 2024-09-23 | End: 2024-09-23

## 2024-09-23 RX ORDER — ALCOHOL ANTISEPTIC PADS
12.5 PADS, MEDICATED (EA) TOPICAL ONCE
Refills: 0 | Status: DISCONTINUED | OUTPATIENT
Start: 2024-09-23 | End: 2024-09-24

## 2024-09-23 RX ORDER — INSULIN LISPRO 100/ML
VIAL (ML) SUBCUTANEOUS
Refills: 0 | Status: DISCONTINUED | OUTPATIENT
Start: 2024-09-23 | End: 2024-09-24

## 2024-09-23 RX ORDER — SEMAGLUTIDE 1 MG/.5ML
1 INJECTION, SOLUTION SUBCUTANEOUS
Refills: 0 | DISCHARGE

## 2024-09-23 RX ORDER — ALCOHOL ANTISEPTIC PADS
25 PADS, MEDICATED (EA) TOPICAL ONCE
Refills: 0 | Status: DISCONTINUED | OUTPATIENT
Start: 2024-09-23 | End: 2024-09-24

## 2024-09-23 RX ORDER — ONDANSETRON HCL/PF 4 MG/2 ML
8 VIAL (ML) INJECTION EVERY 8 HOURS
Refills: 0 | Status: DISCONTINUED | OUTPATIENT
Start: 2024-09-23 | End: 2024-09-24

## 2024-09-23 RX ORDER — PANTOPRAZOLE SODIUM 40 MG/1
40 TABLET, DELAYED RELEASE ORAL
Refills: 0 | Status: DISCONTINUED | OUTPATIENT
Start: 2024-09-23 | End: 2024-09-24

## 2024-09-23 RX ORDER — INFLUENZA VIRUS VACCINE 15; 15; 15; 15 UG/.5ML; UG/.5ML; UG/.5ML; UG/.5ML
0.5 SUSPENSION INTRAMUSCULAR ONCE
Refills: 0 | Status: DISCONTINUED | OUTPATIENT
Start: 2024-09-23 | End: 2024-09-24

## 2024-09-23 RX ORDER — LABETALOL HYDROCHLORIDE 200 MG/1
5 TABLET, FILM COATED ORAL ONCE
Refills: 0 | Status: COMPLETED | OUTPATIENT
Start: 2024-09-23 | End: 2024-09-23

## 2024-09-23 RX ORDER — MULTI VITAMIN/MINERAL SUPPLEMENT WITH ASCORBIC ACID, NIACIN, PYRIDOXINE, PANTOTHENIC ACID, FOLIC ACID, RIBOFLAVIN, THIAMIN, BIOTIN, COBALAMIN AND ZINC. 60; 20; 12.5; 10; 10; 1.7; 1.5; 1; .3; .006 MG/1; MG/1; MG/1; MG/1; MG/1; MG/1; MG/1; MG/1; MG/1; MG/1
0 TABLET, COATED ORAL
Refills: 0 | DISCHARGE

## 2024-09-23 RX ORDER — MONTELUKAST SODIUM 10 MG/1
10 TABLET, FILM COATED ORAL DAILY
Refills: 0 | Status: DISCONTINUED | OUTPATIENT
Start: 2024-09-23 | End: 2024-09-24

## 2024-09-23 RX ORDER — BUDESONIDE AND FORMOTEROL FUMARATE DIHYDRATE 80; 4.5 UG/1; UG/1
0 AEROSOL RESPIRATORY (INHALATION)
Refills: 0 | DISCHARGE

## 2024-09-23 RX ORDER — ALCOHOL ANTISEPTIC PADS
15 PADS, MEDICATED (EA) TOPICAL ONCE
Refills: 0 | Status: DISCONTINUED | OUTPATIENT
Start: 2024-09-23 | End: 2024-09-24

## 2024-09-23 RX ORDER — DILTIAZEM HCL 300 MG
30 CAPSULE, EXTENDED RELEASE 24HR ORAL EVERY 6 HOURS
Refills: 0 | Status: DISCONTINUED | OUTPATIENT
Start: 2024-09-23 | End: 2024-09-24

## 2024-09-23 RX ORDER — HYDROMORPHONE HYDROCHLORIDE 1 MG/ML
0.5 INJECTION, SOLUTION INTRAMUSCULAR; INTRAVENOUS; SUBCUTANEOUS ONCE
Refills: 0 | Status: DISCONTINUED | OUTPATIENT
Start: 2024-09-23 | End: 2024-09-23

## 2024-09-23 RX ORDER — INSULIN LISPRO 100/ML
VIAL (ML) SUBCUTANEOUS AT BEDTIME
Refills: 0 | Status: DISCONTINUED | OUTPATIENT
Start: 2024-09-23 | End: 2024-09-24

## 2024-09-23 RX ORDER — SODIUM CHLORIDE IRRIG SOLUTION 0.9 %
1000 SOLUTION, IRRIGATION IRRIGATION
Refills: 0 | Status: DISCONTINUED | OUTPATIENT
Start: 2024-09-23 | End: 2024-09-23

## 2024-09-23 RX ORDER — DILTIAZEM HCL 300 MG
20 CAPSULE, EXTENDED RELEASE 24HR ORAL ONCE
Refills: 0 | Status: COMPLETED | OUTPATIENT
Start: 2024-09-23 | End: 2024-09-23

## 2024-09-23 RX ORDER — ONDANSETRON HCL/PF 4 MG/2 ML
4 VIAL (ML) INJECTION ONCE
Refills: 0 | Status: DISCONTINUED | OUTPATIENT
Start: 2024-09-23 | End: 2024-09-23

## 2024-09-23 RX ORDER — MONTELUKAST SODIUM 5 MG/1
1 TABLET, CHEWABLE ORAL
Refills: 0 | DISCHARGE

## 2024-09-23 RX ORDER — LABETALOL HYDROCHLORIDE 200 MG/1
10 TABLET, FILM COATED ORAL ONCE
Refills: 0 | Status: COMPLETED | OUTPATIENT
Start: 2024-09-23 | End: 2024-09-23

## 2024-09-23 RX ORDER — SODIUM CHLORIDE IRRIG SOLUTION 0.9 %
1000 SOLUTION, IRRIGATION IRRIGATION ONCE
Refills: 0 | Status: COMPLETED | OUTPATIENT
Start: 2024-09-23 | End: 2024-09-23

## 2024-09-23 RX ADMIN — Medication 1000 MILLILITER(S): at 17:09

## 2024-09-23 RX ADMIN — Medication 75 MILLILITER(S): at 22:17

## 2024-09-23 RX ADMIN — LABETALOL HYDROCHLORIDE 5 MILLIGRAM(S): 200 TABLET, FILM COATED ORAL at 18:52

## 2024-09-23 RX ADMIN — CELECOXIB 400 MILLIGRAM(S): 200 CAPSULE ORAL at 13:36

## 2024-09-23 RX ADMIN — Medication 10 MILLIGRAM(S): at 17:09

## 2024-09-23 RX ADMIN — Medication 975 MILLIGRAM(S): at 13:36

## 2024-09-23 RX ADMIN — Medication 30 MILLIGRAM(S): at 20:21

## 2024-09-23 RX ADMIN — Medication 20 MILLIGRAM(S): at 17:17

## 2024-09-23 RX ADMIN — VANCOMYCIN HCL-SODIUM CHLORIDE IV SOLN 1.5 GM/250ML-0.9% 250 MILLIGRAM(S): 1.5-0.9/25 SOLUTION at 14:42

## 2024-09-23 NOTE — ASU DISCHARGE PLAN (ADULT/PEDIATRIC) - CARE PROVIDER_API CALL
John Mckay  Obstetrics and Gynecology  [address], [address]  [place] [address]  Phone: [phone]  Fax: [phone]  Follow Up Time:

## 2024-09-23 NOTE — CONSULT NOTE ADULT - PROBLEM SELECTOR RECOMMENDATION 9
- new onset afib   - CHADSVASC is 3, pt will need anticoagulation if cleared by OBGYN   - rate control with cardizem, start cardizem 30mg Q6h PO   - check echo, r/o WMA, valvulopathy, pericardial effusion   - check ESR and CRP   - we will follow  - plan of care TBD based upon echo results  - pt will be stable for hysterectomy prior to DC - new onset afib   - CHADSVASC is 3, pt will need anticoagulation if cleared by OBGYN   - rate control with cardizem, start cardizem 30mg Q6h PO   - check echo, r/o WMA, valvulopathy, pericardial effusion   - check ESR and CRP   - we will follow  - plan of care TBD based upon echo results  - pt will be stable for hysterectomy prior to DC  - had recent PNA, check CXR   - outpatient sleep study r/o EDGAR

## 2024-09-23 NOTE — PATIENT PROFILE ADULT - CONTRAINDICATIONS & PRECAUTIONS (SELECT ALL THAT APPLY)
Done!    FW: Service to Family Practice Orders  Received: Today  Sonam Fountain, RN  [facility] Sched Pool - MANJIT Serna         Previous Messages       ----- Message -----   From: Zuleyma Perdomo   Sent: 8/27/2021  10:47 AM CDT   To: MANJIT Serna [facility] Nurse Msg Pool   Subject: Service to Family Practice Orders                 Please place Service to Family Practice orders for mutual patient's pre-op appointment on 9/22/21.     Thanks for your help,   Dr. Miranda's office   Zuleyma       none...

## 2024-09-23 NOTE — DISCHARGE NOTE PROVIDER - HOSPITAL COURSE
Pt was initially scheduled for [facility], BS however case was cancelled due to new onset Afib with rapid ventricular rate. Cardiology workup initiated while inpatient. Upon discharge patient was voiding, tolerating PO, ambulating.

## 2024-09-23 NOTE — DISCHARGE NOTE PROVIDER - CARE PROVIDERS DIRECT ADDRESSES
[email or web address]

## 2024-09-23 NOTE — CONSULT NOTE ADULT - ASSESSMENT
This is a 44 yr old female who presents for outpatient elective hysterectomy, procedure was canceled due to HTN  and new onset afib -150 refractory to metoprolol and digoxin.

## 2024-09-23 NOTE — ASU DISCHARGE PLAN (ADULT/PEDIATRIC) - ACTIVITY LEVEL
2 weeks/No exercise/No heavy lifting/No sports/gym/No weight bearing/Nothing per vagina/No tub baths/No douching/No tampons/No intercourse

## 2024-09-23 NOTE — DISCHARGE NOTE PROVIDER - NSDCMRMEDTOKEN_GEN_ALL_CORE_FT
budesonide-formoterol:   ibuprofen 800 mg oral tablet: 1 tab(s) orally as needed for  moderate pain  montelukast 10 mg oral tablet: 1 tab(s) orally once a day  multivitamin:   pantoprazole 40 mg oral delayed release tablet: 1 tab(s) orally once a day  Rybelsus 14 mg oral tablet: 1 tab(s) orally once a day  Toujeo Max SoloStar 300 units/mL subcutaneous solution: 60 unit(s) subcutaneous once a day   budesonide-formoterol:   Cardizem  mg/24 hours oral capsule, extended release: 1 cap(s) orally once a day  ibuprofen 800 mg oral tablet: 1 tab(s) orally as needed for  moderate pain  montelukast 10 mg oral tablet: 1 tab(s) orally once a day  multivitamin:   pantoprazole 40 mg oral delayed release tablet: 1 tab(s) orally once a day  Rybelsus 14 mg oral tablet: 1 tab(s) orally once a day  Totisha Max SoloStar 300 units/mL subcutaneous solution: 60 unit(s) subcutaneous once a day

## 2024-09-23 NOTE — CONSULT NOTE ADULT - SUBJECTIVE AND OBJECTIVE BOX
[facility]                                              CARDIOLOGY AT [place] [address]                                             Telephone: [phone]. Fax:[phone]                                                       CARDIOLOGY CONSULTATION NOTE                                                                                             History obtained by: Patient and medical record   Community Cardiologist:    obtained: Yes [  ] No [  ]  Reason for Consultation:   Available out pt records reviewed: Yes [  ] No [  ]    Chief complaint:    Patient is a 44y old  Female who presents with a chief complaint of surgery-cancelled (23 Sep 2024 16:52)      HPI:  This is a 44 yr old female who presents for outpatient elective hysterectomy, procedure was canceled due to HTN  and new onset afib -150 refractory to metoprolol and digoxin.       PAST MEDICAL HISTORY  Type 2 diabetes mellitus    Asthma    Acid reflux        PAST SURGICAL HISTORY  No significant past surgical history    H/O lumpectomy    H/O knee surgery    History of colposcopy    H/O exploratory laparotomy    History of removal of cyst    H/O  section    History of bilateral ligation of fallopian tubes    History of cholecystectomy        SUBSTANCE USE HISTORY  Denies current and previous substance use [  ]   CIGARETTES -   ALCOHOL -   DRUGS -     FAMILY HISTORY:  FH: diabetes mellitus (Mother)    FH: heart attack (Mother)    FH: stroke (Mother)    FH: uterine cancer (Mother)    FH: prostate cancer (Father)        CARDIAC SPECIFIC FAMILY HX   No KNOWN family history of Cardiovascular disease, CAD, or sudden death in first degree relatives unless specified below  Family History of Cardiovascular Disease:  [  ]   Coronary Artery Disease in first degree relative:  [  ]   Sudden Cardiac Death in First degree relative: [  ]    HOME MEDICATIONS:  budesonide-formoterol:  (23 Sep 2024 13:27)  ibuprofen 800 mg oral tablet: 1 tab(s) orally as needed for  moderate pain (23 Sep 2024 13:27)  montelukast 10 mg oral tablet: 1 tab(s) orally once a day (23 Sep 2024 13:27)  multivitamin:  (23 Sep 2024 13:27)  pantoprazole 40 mg oral delayed release tablet: 1 tab(s) orally once a day (23 Sep 2024 13:27)  Rybelsus 14 mg oral tablet: 1 tab(s) orally once a day (23 Sep 2024 13:27)  Toujeo Max SoloStar 300 units/mL subcutaneous solution: 60 unit(s) subcutaneous once a day (23 Sep 2024 13:27)      CURRENT CARDIAC MEDICATIONS:  digoxin  Injectable 250 MICROGram(s) IV Push once, Stop order after: 1 Doses      CURRENT OTHER MEDICATIONS:  montelukast 10 milliGRAM(s) Oral daily  fentaNYL    Injectable 50 MICROGram(s) IV Push Once, Stop order after: 1 Doses PRN Severe Pain (7 - 10)  HYDROmorphone  Injectable 0.5 milliGRAM(s) IV Push Once, Stop order after: 1 Doses PRN Moderate Pain (4 - 6)  ondansetron    Tablet 8 milliGRAM(s) Oral every 8 hours, Stop order after: 24 Hours PRN Nausea and/or Vomiting  ondansetron Injectable 4 milliGRAM(s) IV Push Once, Stop order after: 1 Doses PRN Nausea and/or Vomiting  pantoprazole    Tablet 40 milliGRAM(s) Oral before breakfast  simethicone 80 milliGRAM(s) Chew every 6 hours PRN Gas  gentamicin   IVPB 300 milliGRAM(s) IV Intermittent Once, Stop order after: 1 Doses  lactated ringers. 1000 milliLiter(s) (75 mL/Hr) IV Continuous <Continuous>, Stop order after: 24 Hours  lactated ringers. 1000 milliLiter(s) (75 mL/Hr) IV Continuous <Continuous>      ALLERGIES:   penicillin (Rash; Urticaria; Hives)  Bee sting (Anaphylaxis)  apple (Anaphylaxis)      VITAL SIGNS:  T(C): 36.8 (24 @ 16:45), Max: 36.9 (24 @ 13:29)  T(F): 98.2 (24 @ 16:45), Max: 98.4 (24 @ 13:29)  HR: 102 (24 @ 17:15) (93 - 154)  BP: 155/79 (24 @ 17:15) (132/97 - 181/105)  RR: 20 (24 @ 17:15) (16 - 20)  SpO2: 96% (24 @ 17:15) (91% - 96%)    INTAKE AND OUTPUT:       LABS:                            13.3   8.06  )-----------( 274      ( 23 Sep 2024 17:05 )             38.6                       RADIOLOGY IMAGIN Lead ECG (24 @ 16:59) [Ordered.]  12 Lead ECG (24 @ 16:36) [Discontinued]                                                          [facility]                                              CARDIOLOGY AT [place] [address]                                             Telephone: [phone]. Fax:[phone]                                                       CARDIOLOGY CONSULTATION NOTE                                                                                             History obtained by: Patient and medical record   Community Cardiologist: none    obtained: Yes [  ] No [  ]  Reason for Consultation: new onset afib   Available out pt records reviewed: Yes [  ] No [  ]    Chief complaint:    Patient is a 44y old  Female who presents with a chief complaint of surgery-cancelled (23 Sep 2024 16:52)      HPI:  This is a 44 yr old female who presents for outpatient elective hysterectomy, procedure was canceled due to HTN  and new onset afib -150 refractory to metoprolol and digoxin.       PAST MEDICAL HISTORY  Type 2 diabetes mellitus    Asthma    Acid reflux        PAST SURGICAL HISTORY  No significant past surgical history    H/O lumpectomy    H/O knee surgery    History of colposcopy    H/O exploratory laparotomy    History of removal of cyst    H/O  section    History of bilateral ligation of fallopian tubes    History of cholecystectomy        SUBSTANCE USE HISTORY  Denies current and previous substance use [  ]   CIGARETTES -   ALCOHOL -   DRUGS -     FAMILY HISTORY:  FH: diabetes mellitus (Mother)    FH: heart attack (Mother)    FH: stroke (Mother)    FH: uterine cancer (Mother)    FH: prostate cancer (Father)        CARDIAC SPECIFIC FAMILY HX   No KNOWN family history of Cardiovascular disease, CAD, or sudden death in first degree relatives unless specified below  Family History of Cardiovascular Disease:  [  ]   Coronary Artery Disease in first degree relative:  [  ]   Sudden Cardiac Death in First degree relative: [  ]    HOME MEDICATIONS:  budesonide-formoterol:  (23 Sep 2024 13:27)  ibuprofen 800 mg oral tablet: 1 tab(s) orally as needed for  moderate pain (23 Sep 2024 13:27)  montelukast 10 mg oral tablet: 1 tab(s) orally once a day (23 Sep 2024 13:27)  multivitamin:  (23 Sep 2024 13:27)  pantoprazole 40 mg oral delayed release tablet: 1 tab(s) orally once a day (23 Sep 2024 13:27)  Rybelsus 14 mg oral tablet: 1 tab(s) orally once a day (23 Sep 2024 13:27)  Toujeo Max SoloStar 300 units/mL subcutaneous solution: 60 unit(s) subcutaneous once a day (23 Sep 2024 13:27)      CURRENT CARDIAC MEDICATIONS:  digoxin  Injectable 250 MICROGram(s) IV Push once, Stop order after: 1 Doses      CURRENT OTHER MEDICATIONS:  montelukast 10 milliGRAM(s) Oral daily  fentaNYL    Injectable 50 MICROGram(s) IV Push Once, Stop order after: 1 Doses PRN Severe Pain (7 - 10)  HYDROmorphone  Injectable 0.5 milliGRAM(s) IV Push Once, Stop order after: 1 Doses PRN Moderate Pain (4 - 6)  ondansetron    Tablet 8 milliGRAM(s) Oral every 8 hours, Stop order after: 24 Hours PRN Nausea and/or Vomiting  ondansetron Injectable 4 milliGRAM(s) IV Push Once, Stop order after: 1 Doses PRN Nausea and/or Vomiting  pantoprazole    Tablet 40 milliGRAM(s) Oral before breakfast  simethicone 80 milliGRAM(s) Chew every 6 hours PRN Gas  gentamicin   IVPB 300 milliGRAM(s) IV Intermittent Once, Stop order after: 1 Doses  lactated ringers. 1000 milliLiter(s) (75 mL/Hr) IV Continuous <Continuous>, Stop order after: 24 Hours  lactated ringers. 1000 milliLiter(s) (75 mL/Hr) IV Continuous <Continuous>      ALLERGIES:   penicillin (Rash; Urticaria; Hives)  Bee sting (Anaphylaxis)  apple (Anaphylaxis)      VITAL SIGNS:  T(C): 36.8 (24 @ 16:45), Max: 36.9 (24 @ 13:29)  T(F): 98.2 (24 @ 16:45), Max: 98.4 (24 @ 13:29)  HR: 102 (24 @ 17:15) (93 - 154)  BP: 155/79 (24 @ 17:15) (132/97 - 181/105)  RR: 20 (24 @ 17:15) (16 - 20)  SpO2: 96% (24 @ 17:15) (91% - 96%)    INTAKE AND OUTPUT:       LABS:                            13.3   8.06  )-----------( 274      ( 23 Sep 2024 17:05 )             38.6                       RADIOLOGY IMAGIN Lead ECG (24 @ 16:59) [Ordered.]  12 Lead ECG (24 @ 16:36) [Discontinued]

## 2024-09-23 NOTE — PATIENT PROFILE ADULT - FALL HARM RISK - HARM RISK INTERVENTIONS

## 2024-09-23 NOTE — CONSULT NOTE ADULT - NS ATTEND AMEND GEN_ALL_CORE FT
atrial fibrillation incidental during procedure.  planned for hysterectomy due to pain and endomyosis.  atrial fibrillation with rapid ventricular rate received digocino by anesthesia  anxiety.    new daignosis of Hypertension   likely anxiety  initiate cardizem 30 Q6 increase to 60 as needed. IV cardizem if HR > 150.    xanax. Transthoracic echocardiogram   if heart rate contorlled then oplan for surgery if patient optimzied.   recent opneumopniia. send ESR and CRP. evaluate for pericarditis. also restric nsaids use. recent Hypertension . may bne worse due to nsaids also has history of preeclampsia.

## 2024-09-23 NOTE — DISCHARGE NOTE PROVIDER - NSDCFUADDINST_GEN_ALL_CORE_FT
Please take your medication as prescribed  Please make an appointment with cardiology in 2 weeks with Dr. Mercer  Please follow up in your GYN office in 2 weeks.

## 2024-09-23 NOTE — CHART NOTE - NSCHARTNOTEFT_GEN_A_CORE
Pt taken to OR for scheduled RA-TLH, BS for abnormal uterine bleeding, adenomyosis.  Upon presentation to OR HR >110s persistently and after being induced with anesthesia patient developed new onset AFib with rapid ventricular rate. Due to persistant tachycardia and new arrythmia decision made in collaboration with anesthesiologist, Dr. Mckay and Dr. Albert that aborting procedure was safest course of action at this time. Pt to be admitted overnight to telemetry bed, EKG ordered and cardiology consulted for workup.  will continue to monitor inpatient.

## 2024-09-23 NOTE — DISCHARGE NOTE PROVIDER - CARE PROVIDER_API CALL
John Mckay  Obstetrics and Gynecology  [address], [address]  [place] [address]  Phone: [phone]  Fax: [phone]  Follow Up Time:    John Mckay  Obstetrics and Gynecology  [address], [address]  [place] [address]  Phone: [phone]  Fax: [phone]  Follow Up Time:     Sabrina Mercer  Cardiology  [address] [place] [address]  Phone: [phone]  Fax: [phone]  Follow Up Time:

## 2024-09-23 NOTE — DISCHARGE NOTE PROVIDER - PROVIDER TOKENS
PROVIDER:[TOKEN:[[id number]]] PROVIDER:[TOKEN:[[id number]]],PROVIDER:[TOKEN:[[id number]]]

## 2024-09-24 ENCOUNTER — TRANSCRIPTION ENCOUNTER (OUTPATIENT)
Age: 44
End: 2024-09-24

## 2024-09-24 VITALS
DIASTOLIC BLOOD PRESSURE: 84 MMHG | RESPIRATION RATE: 17 BRPM | TEMPERATURE: 98 F | OXYGEN SATURATION: 98 % | HEART RATE: 79 BPM | SYSTOLIC BLOOD PRESSURE: 140 MMHG

## 2024-09-24 LAB
CRP SERPL-MCNC: 6 MG/L — HIGH
ERYTHROCYTE [SEDIMENTATION RATE] IN BLOOD: 16 MM/HR — SIGNIFICANT CHANGE UP (ref 0–20)
GLUCOSE BLDC GLUCOMTR-MCNC: 160 MG/DL — HIGH (ref 70–99)
GLUCOSE BLDC GLUCOMTR-MCNC: 163 MG/DL — HIGH (ref 70–99)

## 2024-09-24 PROCEDURE — 81025 URINE PREGNANCY TEST: CPT

## 2024-09-24 PROCEDURE — 86140 C-REACTIVE PROTEIN: CPT

## 2024-09-24 PROCEDURE — 84436 ASSAY OF TOTAL THYROXINE: CPT

## 2024-09-24 PROCEDURE — 93306 TTE W/DOPPLER COMPLETE: CPT

## 2024-09-24 PROCEDURE — 84100 ASSAY OF PHOSPHORUS: CPT

## 2024-09-24 PROCEDURE — 82550 ASSAY OF CK (CPK): CPT

## 2024-09-24 PROCEDURE — 80048 BASIC METABOLIC PNL TOTAL CA: CPT

## 2024-09-24 PROCEDURE — C9399: CPT

## 2024-09-24 PROCEDURE — 84480 ASSAY TRIIODOTHYRONINE (T3): CPT

## 2024-09-24 PROCEDURE — 84443 ASSAY THYROID STIM HORMONE: CPT

## 2024-09-24 PROCEDURE — 36415 COLL VENOUS BLD VENIPUNCTURE: CPT

## 2024-09-24 PROCEDURE — 93010 ELECTROCARDIOGRAM REPORT: CPT

## 2024-09-24 PROCEDURE — 71045 X-RAY EXAM CHEST 1 VIEW: CPT

## 2024-09-24 PROCEDURE — 82962 GLUCOSE BLOOD TEST: CPT

## 2024-09-24 PROCEDURE — 84484 ASSAY OF TROPONIN QUANT: CPT

## 2024-09-24 PROCEDURE — 84439 ASSAY OF FREE THYROXINE: CPT

## 2024-09-24 PROCEDURE — 85027 COMPLETE CBC AUTOMATED: CPT

## 2024-09-24 PROCEDURE — 85652 RBC SED RATE AUTOMATED: CPT

## 2024-09-24 PROCEDURE — 83735 ASSAY OF MAGNESIUM: CPT

## 2024-09-24 PROCEDURE — 93005 ELECTROCARDIOGRAM TRACING: CPT

## 2024-09-24 RX ORDER — DILTIAZEM HCL 300 MG
1 CAPSULE, EXTENDED RELEASE 24HR ORAL
Qty: 30 | Refills: 0
Start: 2024-09-24 | End: 2024-10-23

## 2024-09-24 RX ORDER — DILTIAZEM HCL 300 MG
120 CAPSULE, EXTENDED RELEASE 24HR ORAL DAILY
Refills: 0 | Status: DISCONTINUED | OUTPATIENT
Start: 2024-09-24 | End: 2024-09-24

## 2024-09-24 RX ADMIN — Medication 30 MILLIGRAM(S): at 07:59

## 2024-09-24 RX ADMIN — MONTELUKAST SODIUM 10 MILLIGRAM(S): 10 TABLET, FILM COATED ORAL at 11:42

## 2024-09-24 RX ADMIN — Medication 2: at 07:59

## 2024-09-24 RX ADMIN — Medication 2: at 12:01

## 2024-09-24 RX ADMIN — PANTOPRAZOLE SODIUM 40 MILLIGRAM(S): 40 TABLET, DELAYED RELEASE ORAL at 06:08

## 2024-09-24 RX ADMIN — Medication 30 MILLIGRAM(S): at 01:45

## 2024-09-24 RX ADMIN — Medication 120 MILLIGRAM(S): at 14:19

## 2024-09-24 NOTE — DISCHARGE NOTE NURSING/CASE MANAGEMENT/SOCIAL WORK - PATIENT PORTAL LINK FT
You can access the FollowMyHealth Patient Portal offered by [facility] by registering at the following website: [email or web address]. By joining [employer]’s FollowMyHealth portal, you will also be able to view your health information using other applications (apps) compatible with our system.

## 2024-09-24 NOTE — PROGRESS NOTE ADULT - ASSESSMENT
A/P: 44y now HD#2 admitted for cardiology workup 2/2 new onset Afib with RVR in OR yesterday. Pt was initially scheduled for RA-TLH, BSO however case cancelled due to abnormal rhythm.  Neuro: Pain well controlled. Continue current pain regimen.  CV: No history of cardiovascular disease previously, now with new Afib with RVR, currently on cardizem 30mg q6h po, cardiology consulted, recs appreciated. TTE performed and pending read. CXR pending to r/o pneumonia/pericarditis. Blood pressure well controlled.  Pulm: No active disease. Saturating well on room air.  Heme: Hgb 13.0>13.3  GI/: No active disease. Bowel sounds and function normal, tolerating PO diet. Continue current bowel regimen. Voiding spontaneously.  DVT ppx: Ambulation encouraged, SCDs when in bed.  Dispo: Pending labs and AM rounds. Will follow up with cardiology for dispo plan outpatient pending echo read.

## 2024-09-24 NOTE — PROGRESS NOTE ADULT - SUBJECTIVE AND OBJECTIVE BOX
CAROLYN ZAPATA is a 44y now HD#2 admitted for cardiology workup 2/2 new onset Afib with RVR in OR yesterday. Pt was initially scheduled for RA-TLH, BSO however case cancelled due to abnormal rhythm.    S:    No acute events overnight.   Patient was seen and examined at bedside.   Patient has no complaints this AM.   Tolerating regular diet.  Ambulating without difficulty.   She denies lightheadedness, dizziness, palpitations, chest pain and SOB.     O:   T(C): 36.6 (09-24-24 @ 04:40), Max: 36.9 (09-23-24 @ 13:29)  HR: 82 (09-24-24 @ 04:40) (79 - 154)  BP: 148/89 (09-24-24 @ 04:40) (129/93 - 181/105)  RR: 18 (09-24-24 @ 04:40) (16 - 22)  SpO2: 98% (09-24-24 @ 04:40) (91% - 98%)    Gen: NAD, AAOx3  CV: RRR, S1 S2 present  Pulm: CTAB  Extremities: No calf tenderness or edema     Labs:       09-23-24 @ 07:01  -  09-24-24 @ 06:43  --------------------------------------------------------  IN: 575 mL / OUT: 1837 mL / NET: -1262 mL

## 2024-09-24 NOTE — CHART NOTE - NSCHARTNOTEFT_GEN_A_CORE
44 yr old female who presents for outpatient elective hysterectomy, procedure was canceled due to HTN  and new onset afib -150 refractory to metoprolol and digoxin.     - [id number] 3 however AC is deferred for now until cleared by GYN   - TTE with EF 70-75%, normal RV, no pericardial effusion, ascending aorta 4.0cm, possible PFO with predominantly left to right shunt  - discussed results with patient who verbalized understanding. She understands the need for follow up with cardiology for further workup and to address AC once cleared by GYN  - diltiazem changed to diltiazem CD 120mg PO daily  - patient advised to follow up as OP in 2 weeks, can follow up with Dr. Mercer  - OP MCOT to assess for further Afib    No further inpatient cardiac work up at this time.  Will sign off.  Please reconsult if needed. 44 yr old female who presents for outpatient elective hysterectomy, procedure was canceled due to HTN  and new onset afib -150 refractory to metoprolol and digoxin.     - [id number] 3 however AC is deferred for now until cleared by GYN   - TTE with EF 70-75%, normal RV, no pericardial effusion, ascending aorta 4.0cm, possible PFO with predominantly left to right shunt  - discussed results with patient who verbalized understanding. She understands the need for follow up with cardiology for further workup and to address AC once cleared by GYN  - ESR/CRP not significantly elevated  - diltiazem changed to diltiazem CD 120mg PO daily  - patient advised to follow up as OP in 2 weeks, can follow up with Dr. Mercer  - OP MCOT to assess for further Afib    No further inpatient cardiac work up at this time.  Will sign off.  Please reconsult if needed.

## 2024-09-24 NOTE — DISCHARGE NOTE NURSING/CASE MANAGEMENT/SOCIAL WORK - NSDCPEFALRISK_GEN_ALL_CORE
For information on Fall & Injury Prevention, visit: [email or web address] OR  [email or web address] OR  https://www.cdc.gov/steadi/patient.html

## 2024-09-26 ENCOUNTER — APPOINTMENT (OUTPATIENT)
Dept: CARDIOLOGY | Facility: CLINIC | Age: 44
End: 2024-09-26
Payer: COMMERCIAL

## 2024-09-26 ENCOUNTER — APPOINTMENT (OUTPATIENT)
Dept: OBGYN | Facility: CLINIC | Age: 44
End: 2024-09-26
Payer: COMMERCIAL

## 2024-09-26 VITALS
DIASTOLIC BLOOD PRESSURE: 101 MMHG | SYSTOLIC BLOOD PRESSURE: 163 MMHG | WEIGHT: 257 LBS | HEIGHT: 66 IN | BODY MASS INDEX: 41.3 KG/M2

## 2024-09-26 VITALS
SYSTOLIC BLOOD PRESSURE: 154 MMHG | HEIGHT: 64 IN | OXYGEN SATURATION: 96 % | DIASTOLIC BLOOD PRESSURE: 92 MMHG | HEART RATE: 86 BPM | WEIGHT: 257 LBS | BODY MASS INDEX: 43.87 KG/M2

## 2024-09-26 DIAGNOSIS — E66.01 MORBID (SEVERE) OBESITY DUE TO EXCESS CALORIES: ICD-10-CM

## 2024-09-26 DIAGNOSIS — R01.1 CARDIAC MURMUR, UNSPECIFIED: ICD-10-CM

## 2024-09-26 DIAGNOSIS — N80.03 ADENOMYOSIS OF THE UTERUS: ICD-10-CM

## 2024-09-26 DIAGNOSIS — Z01.810 ENCOUNTER FOR PREPROCEDURAL CARDIOVASCULAR EXAMINATION: ICD-10-CM

## 2024-09-26 DIAGNOSIS — N92.1 EXCESSIVE AND FREQUENT MENSTRUATION WITH IRREGULAR CYCLE: ICD-10-CM

## 2024-09-26 DIAGNOSIS — I48.91 UNSPECIFIED ATRIAL FIBRILLATION: ICD-10-CM

## 2024-09-26 DIAGNOSIS — I10 ESSENTIAL (PRIMARY) HYPERTENSION: ICD-10-CM

## 2024-09-26 DIAGNOSIS — Z09 ENCOUNTER FOR FOLLOW-UP EXAMINATION AFTER COMPLETED TREATMENT FOR CONDITIONS OTHER THAN MALIGNANT NEOPLASM: ICD-10-CM

## 2024-09-26 PROCEDURE — 99215 OFFICE O/P EST HI 40 MIN: CPT | Mod: 25

## 2024-09-26 PROCEDURE — 93000 ELECTROCARDIOGRAM COMPLETE: CPT

## 2024-09-26 PROCEDURE — 99214 OFFICE O/P EST MOD 30 MIN: CPT

## 2024-09-26 RX ORDER — PEN NEEDLE, DIABETIC 32GX 5/32"
32G X 4 MM NEEDLE, DISPOSABLE MISCELLANEOUS
Qty: 400 | Refills: 0 | Status: ACTIVE | COMMUNITY
Start: 2024-08-23

## 2024-09-26 RX ORDER — DILTIAZEM HYDROCHLORIDE 240 MG/1
240 CAPSULE, EXTENDED RELEASE ORAL
Qty: 90 | Refills: 1 | Status: ACTIVE | COMMUNITY
Start: 1900-01-01 | End: 1900-01-01

## 2024-09-26 RX ORDER — NORETHINDRONE ACETATE 5 MG/1
5 TABLET ORAL
Qty: 180 | Refills: 0 | Status: ACTIVE | COMMUNITY
Start: 2024-09-26 | End: 1900-01-01

## 2024-09-26 RX ORDER — GLUCAGON INJECTION, SOLUTION 1 MG/.2ML
1 INJECTION, SOLUTION SUBCUTANEOUS
Qty: 1 | Refills: 0 | Status: ACTIVE | COMMUNITY
Start: 2024-04-04

## 2024-09-26 NOTE — HISTORY OF PRESENT ILLNESS
[FreeTextEntry1] : 44-year-old female presents for consultation to discuss treatment options for heavy menstrual bleeding, severe dysmenorrhea, and suspected adenomyosis.  The patient states after the birth of her second baby her menses have been every 21 days, lasting for 10 days.  She does get heavy bleeding and states she soaks through 1 pad per hour on heavy days.  She states some days the bleeding is so heavy she does need to wear a diaper.  She also admits to severe dysmenorrhea.  She is currently taking ibuprofen with some relief.  She has noticed over the past few months she has had intermenstrual bleeding.  Her GYN history is significant for ovarian cysts and fibroids.  Her last sonogram was in 2024.  She was noted to have 2 fibroids.  She has an anterior subserosal fibroid measuring 4 cm in size.  She also has a posterior intramural fibroid measuring 1.5 cm in size.  Her uterus also had the appearance of adenomyosis.  She had an endometrial biopsy done at this time which was benign.  Most recent CBC without anemia.  The patient was interested at that time in definitive treatment and was scheduled for robotic hysterectomy.  The patient was taken to the OR and when the tilt test was performed her O2 sats dropped.  Decision was made to cancel the case.  The patient saw a pulmonologist and got cleared for surgery.  The patient rescheduled a hysterectomy and the second time she went under anesthesia she had elevated blood pressure, elevated heart rate and went into atrial fibrillation.  At that time, the surgery was again canceled.  The patient had a cardiac workup in the hospital and had a follow-up with cardiology earlier today.  She was diagnosed with atrial fibrillation, a dilated aorta, and a possible PFO.  The remainder of her cardiac workup was negative.  Cardiologist would like her to start a blood thinner however is nervous to start this medication with a history of her heavy bleeding.  She is not interested in scheduling a hysterectomy at this time as she has been under anesthesia twice with complications.  She is interested in discussing other treatment options.  Past medical history is significant for type 2 diabetes, GERD, and asthma.  Past surgical history includes 2  sections, bilateral salpingectomy, cholecystectomy, 2 knee surgeries, right breast lumpectomy, laparoscopies, D&C x 2, removal of scalp cyst x 2.  Family history is significant for her mother with uterine cancer in her 30s.  The patient had genetic testing which was negative.  Social history is negative x 3.  GYN history as above.  OB history: -1-0-2.  She has a history of 2  sections at 35 and 37 weeks.  She is allergic to penicillin.  She is currently taking Rybelsus, Toujeo, pantoprazole, multivitamins, ibuprofen, diltiazem, and montelukast.

## 2024-09-26 NOTE — PLAN
[FreeTextEntry1] : 44-year-old female with heavy menses, severe dysmenorrhea, fibroids and suspected adenomyosis.  Patient was scheduled for definitive treatment with a hysterectomy and underwent anesthesia twice both times with complications.  She is interested in discussing all treatment options today.  We discussed medications including lysteda.  We discussed combined estrogen and progesterone options including the birth control pill, the patch, and the ring.  We discussed progesterone only options including the minipill, Nexplanon, Depo-Provera, and progesterone secreting IUDs.  We discussed surgical options including endometrial ablation and hysterectomy.  Risks and benefits of all options were reviewed with the patient at length.  Rx was provided for Aygestin 10 mg to be taken daily.  We discussed the amenorrhea rates with Aygestin.  She is also interested in scheduling an endometrial ablation.  All risk, benefits and potential complications of NovaSure ablation were reviewed with the patient at length.  Her mother had what she believes to be a history of uterine cancer.  The patient had genetic testing which was negative.  We discussed that an endometrial ablation could potentially delay the diagnosis of a uterine cancer in the future.  She is also interested in having an Mirena IUD inserted at this time.  Risks and benefits of Mirena IUD reviewed with the patient.  If she gets good relief with the Aygestin she may consider canceling the surgery.  Strict call parameters were reviewed.  The patient was given the opportunity to ask questions and all were answered to her satisfaction.

## 2024-10-01 RX ORDER — LOSARTAN POTASSIUM 25 MG/1
25 TABLET, FILM COATED ORAL
Qty: 30 | Refills: 0 | Status: ACTIVE | COMMUNITY
Start: 2024-10-01 | End: 1900-01-01

## 2024-10-09 ENCOUNTER — NON-APPOINTMENT (OUTPATIENT)
Age: 44
End: 2024-10-09

## 2024-10-09 ENCOUNTER — APPOINTMENT (OUTPATIENT)
Dept: CARDIOLOGY | Facility: CLINIC | Age: 44
End: 2024-10-09
Payer: COMMERCIAL

## 2024-10-09 LAB
ANION GAP SERPL CALC-SCNC: 12 MMOL/L
BUN SERPL-MCNC: 12 MG/DL
CALCIUM SERPL-MCNC: 9.9 MG/DL
CHLORIDE SERPL-SCNC: 104 MMOL/L
CO2 SERPL-SCNC: 25 MMOL/L
CREAT SERPL-MCNC: 0.76 MG/DL
EGFR: 100 ML/MIN/1.73M2
GLUCOSE SERPL-MCNC: 137 MG/DL
POTASSIUM SERPL-SCNC: 4.6 MMOL/L
SODIUM SERPL-SCNC: 142 MMOL/L

## 2024-10-09 PROCEDURE — 93308 TTE F-UP OR LMTD: CPT

## 2024-10-09 PROCEDURE — 93325 DOPPLER ECHO COLOR FLOW MAPG: CPT

## 2024-10-10 ENCOUNTER — NON-APPOINTMENT (OUTPATIENT)
Age: 44
End: 2024-10-10

## 2024-10-16 ENCOUNTER — OUTPATIENT (OUTPATIENT)
Dept: OUTPATIENT SERVICES | Facility: HOSPITAL | Age: 44
LOS: 1 days | End: 2024-10-16
Payer: COMMERCIAL

## 2024-10-16 DIAGNOSIS — Z98.890 OTHER SPECIFIED POSTPROCEDURAL STATES: Chronic | ICD-10-CM

## 2024-10-16 DIAGNOSIS — Z90.49 ACQUIRED ABSENCE OF OTHER SPECIFIED PARTS OF DIGESTIVE TRACT: Chronic | ICD-10-CM

## 2024-10-16 DIAGNOSIS — Z98.891 HISTORY OF UTERINE SCAR FROM PREVIOUS SURGERY: Chronic | ICD-10-CM

## 2024-10-16 DIAGNOSIS — G47.33 OBSTRUCTIVE SLEEP APNEA (ADULT) (PEDIATRIC): ICD-10-CM

## 2024-10-16 DIAGNOSIS — Z98.51 TUBAL LIGATION STATUS: Chronic | ICD-10-CM

## 2024-10-16 PROCEDURE — 95800 SLP STDY UNATTENDED: CPT

## 2024-10-16 PROCEDURE — 95800 SLP STDY UNATTENDED: CPT | Mod: 26

## 2024-10-22 ENCOUNTER — APPOINTMENT (OUTPATIENT)
Dept: CT IMAGING | Facility: CLINIC | Age: 44
End: 2024-10-22
Payer: COMMERCIAL

## 2024-10-22 ENCOUNTER — OUTPATIENT (OUTPATIENT)
Dept: OUTPATIENT SERVICES | Facility: HOSPITAL | Age: 44
LOS: 1 days | End: 2024-10-22

## 2024-10-22 ENCOUNTER — OUTPATIENT (OUTPATIENT)
Dept: OUTPATIENT SERVICES | Facility: HOSPITAL | Age: 44
LOS: 1 days | End: 2024-10-22
Payer: COMMERCIAL

## 2024-10-22 VITALS
OXYGEN SATURATION: 99 % | DIASTOLIC BLOOD PRESSURE: 80 MMHG | SYSTOLIC BLOOD PRESSURE: 110 MMHG | HEIGHT: 66 IN | WEIGHT: 255.3 LBS | RESPIRATION RATE: 18 BRPM | TEMPERATURE: 97 F | HEART RATE: 87 BPM

## 2024-10-22 DIAGNOSIS — Z98.51 TUBAL LIGATION STATUS: Chronic | ICD-10-CM

## 2024-10-22 DIAGNOSIS — N92.1 EXCESSIVE AND FREQUENT MENSTRUATION WITH IRREGULAR CYCLE: ICD-10-CM

## 2024-10-22 DIAGNOSIS — Z98.891 HISTORY OF UTERINE SCAR FROM PREVIOUS SURGERY: Chronic | ICD-10-CM

## 2024-10-22 DIAGNOSIS — Z00.8 ENCOUNTER FOR OTHER GENERAL EXAMINATION: ICD-10-CM

## 2024-10-22 DIAGNOSIS — Z98.890 OTHER SPECIFIED POSTPROCEDURAL STATES: Chronic | ICD-10-CM

## 2024-10-22 DIAGNOSIS — E11.9 TYPE 2 DIABETES MELLITUS WITHOUT COMPLICATIONS: ICD-10-CM

## 2024-10-22 DIAGNOSIS — Z01.818 ENCOUNTER FOR OTHER PREPROCEDURAL EXAMINATION: ICD-10-CM

## 2024-10-22 DIAGNOSIS — N80.03 ADENOMYOSIS OF THE UTERUS: ICD-10-CM

## 2024-10-22 DIAGNOSIS — Z86.79 PERSONAL HISTORY OF OTHER DISEASES OF THE CIRCULATORY SYSTEM: ICD-10-CM

## 2024-10-22 DIAGNOSIS — R22.0 LOCALIZED SWELLING, MASS AND LUMP, HEAD: Chronic | ICD-10-CM

## 2024-10-22 DIAGNOSIS — R01.1 CARDIAC MURMUR, UNSPECIFIED: ICD-10-CM

## 2024-10-22 DIAGNOSIS — N94.6 DYSMENORRHEA, UNSPECIFIED: ICD-10-CM

## 2024-10-22 DIAGNOSIS — I48.91 UNSPECIFIED ATRIAL FIBRILLATION: ICD-10-CM

## 2024-10-22 DIAGNOSIS — Z90.49 ACQUIRED ABSENCE OF OTHER SPECIFIED PARTS OF DIGESTIVE TRACT: Chronic | ICD-10-CM

## 2024-10-22 DIAGNOSIS — E66.01 MORBID (SEVERE) OBESITY DUE TO EXCESS CALORIES: ICD-10-CM

## 2024-10-22 DIAGNOSIS — Z91.89 OTHER SPECIFIED PERSONAL RISK FACTORS, NOT ELSEWHERE CLASSIFIED: ICD-10-CM

## 2024-10-22 DIAGNOSIS — E78.5 HYPERLIPIDEMIA, UNSPECIFIED: ICD-10-CM

## 2024-10-22 DIAGNOSIS — Z29.9 ENCOUNTER FOR PROPHYLACTIC MEASURES, UNSPECIFIED: ICD-10-CM

## 2024-10-22 DIAGNOSIS — I10 ESSENTIAL (PRIMARY) HYPERTENSION: ICD-10-CM

## 2024-10-22 DIAGNOSIS — J45.909 UNSPECIFIED ASTHMA, UNCOMPLICATED: ICD-10-CM

## 2024-10-22 LAB
A1C WITH ESTIMATED AVERAGE GLUCOSE RESULT: 7.4 % — HIGH (ref 4–5.6)
ANION GAP SERPL CALC-SCNC: 11 MMOL/L — SIGNIFICANT CHANGE UP (ref 5–17)
APTT BLD: 30.9 SEC — SIGNIFICANT CHANGE UP (ref 24.5–35.6)
BASOPHILS # BLD AUTO: 0.04 K/UL — SIGNIFICANT CHANGE UP (ref 0–0.2)
BASOPHILS NFR BLD AUTO: 0.5 % — SIGNIFICANT CHANGE UP (ref 0–2)
BUN SERPL-MCNC: 8.1 MG/DL — SIGNIFICANT CHANGE UP (ref 8–20)
CALCIUM SERPL-MCNC: 8.7 MG/DL — SIGNIFICANT CHANGE UP (ref 8.4–10.5)
CHLORIDE SERPL-SCNC: 102 MMOL/L — SIGNIFICANT CHANGE UP (ref 96–108)
CO2 SERPL-SCNC: 24 MMOL/L — SIGNIFICANT CHANGE UP (ref 22–29)
CREAT SERPL-MCNC: 0.65 MG/DL — SIGNIFICANT CHANGE UP (ref 0.5–1.3)
EGFR: 112 ML/MIN/1.73M2 — SIGNIFICANT CHANGE UP
EOSINOPHIL # BLD AUTO: 0.09 K/UL — SIGNIFICANT CHANGE UP (ref 0–0.5)
EOSINOPHIL NFR BLD AUTO: 1.2 % — SIGNIFICANT CHANGE UP (ref 0–6)
ESTIMATED AVERAGE GLUCOSE: 166 MG/DL — HIGH (ref 68–114)
GLUCOSE SERPL-MCNC: 147 MG/DL — HIGH (ref 70–99)
HCG UR QL: NEGATIVE — SIGNIFICANT CHANGE UP
HCT VFR BLD CALC: 39.1 % — SIGNIFICANT CHANGE UP (ref 34.5–45)
HGB BLD-MCNC: 13.3 G/DL — SIGNIFICANT CHANGE UP (ref 11.5–15.5)
IMM GRANULOCYTES NFR BLD AUTO: 0.3 % — SIGNIFICANT CHANGE UP (ref 0–0.9)
INR BLD: 1.07 RATIO — SIGNIFICANT CHANGE UP (ref 0.85–1.16)
LYMPHOCYTES # BLD AUTO: 1.83 K/UL — SIGNIFICANT CHANGE UP (ref 1–3.3)
LYMPHOCYTES # BLD AUTO: 24.6 % — SIGNIFICANT CHANGE UP (ref 13–44)
MCHC RBC-ENTMCNC: 29.6 PG — SIGNIFICANT CHANGE UP (ref 27–34)
MCHC RBC-ENTMCNC: 34 GM/DL — SIGNIFICANT CHANGE UP (ref 32–36)
MCV RBC AUTO: 87.1 FL — SIGNIFICANT CHANGE UP (ref 80–100)
MONOCYTES # BLD AUTO: 0.45 K/UL — SIGNIFICANT CHANGE UP (ref 0–0.9)
MONOCYTES NFR BLD AUTO: 6 % — SIGNIFICANT CHANGE UP (ref 2–14)
NEUTROPHILS # BLD AUTO: 5.01 K/UL — SIGNIFICANT CHANGE UP (ref 1.8–7.4)
NEUTROPHILS NFR BLD AUTO: 67.4 % — SIGNIFICANT CHANGE UP (ref 43–77)
PLATELET # BLD AUTO: 318 K/UL — SIGNIFICANT CHANGE UP (ref 150–400)
POTASSIUM SERPL-MCNC: 4.2 MMOL/L — SIGNIFICANT CHANGE UP (ref 3.5–5.3)
POTASSIUM SERPL-SCNC: 4.2 MMOL/L — SIGNIFICANT CHANGE UP (ref 3.5–5.3)
PROTHROM AB SERPL-ACNC: 12.1 SEC — SIGNIFICANT CHANGE UP (ref 9.9–13.4)
RBC # BLD: 4.49 M/UL — SIGNIFICANT CHANGE UP (ref 3.8–5.2)
RBC # FLD: 13.1 % — SIGNIFICANT CHANGE UP (ref 10.3–14.5)
SODIUM SERPL-SCNC: 137 MMOL/L — SIGNIFICANT CHANGE UP (ref 135–145)
WBC # BLD: 7.44 K/UL — SIGNIFICANT CHANGE UP (ref 3.8–10.5)
WBC # FLD AUTO: 7.44 K/UL — SIGNIFICANT CHANGE UP (ref 3.8–10.5)

## 2024-10-22 PROCEDURE — 75574 CT ANGIO HRT W/3D IMAGE: CPT

## 2024-10-22 PROCEDURE — 81025 URINE PREGNANCY TEST: CPT

## 2024-10-22 PROCEDURE — 85730 THROMBOPLASTIN TIME PARTIAL: CPT

## 2024-10-22 PROCEDURE — 75574 CT ANGIO HRT W/3D IMAGE: CPT | Mod: 26

## 2024-10-22 PROCEDURE — 85610 PROTHROMBIN TIME: CPT

## 2024-10-22 PROCEDURE — 36415 COLL VENOUS BLD VENIPUNCTURE: CPT

## 2024-10-22 PROCEDURE — 83036 HEMOGLOBIN GLYCOSYLATED A1C: CPT

## 2024-10-22 PROCEDURE — 80048 BASIC METABOLIC PNL TOTAL CA: CPT

## 2024-10-22 PROCEDURE — G0463: CPT

## 2024-10-22 PROCEDURE — 85025 COMPLETE CBC W/AUTO DIFF WBC: CPT

## 2024-10-22 RX ORDER — NORETHINDRONE 0.35 MG
2 KIT ORAL
Refills: 0 | DISCHARGE

## 2024-10-22 RX ORDER — INSULIN GLARGINE,HUM.REC.ANLOG 100/ML
110 VIAL (ML) SUBCUTANEOUS
Refills: 0 | DISCHARGE

## 2024-10-22 RX ORDER — BUDESONIDE AND FORMOTEROL FUMARATE DIHYDRATE 80; 4.5 UG/1; UG/1
2 AEROSOL RESPIRATORY (INHALATION)
Refills: 0 | DISCHARGE

## 2024-10-22 RX ORDER — LOSARTAN POTASSIUM 25 MG/1
1 TABLET ORAL
Refills: 0 | DISCHARGE

## 2024-10-22 RX ORDER — DILTIAZEM HCL 5 MG/ML
1 VIAL (ML) INTRAVENOUS
Refills: 0 | DISCHARGE

## 2024-10-22 NOTE — H&P PST ADULT - NSANTHOSAYNRD_GEN_A_CORE
recent negative sleep apnea study as per pt./No. EDGAR screening performed.  STOP BANG Legend: 0-2 = LOW Risk; 3-4 = INTERMEDIATE Risk; 5-8 = HIGH Risk

## 2024-10-22 NOTE — H&P PST ADULT - PROBLEM SELECTOR PLAN 2
Medical optimization pending. PCP for medical management and follow up as indicated.  Pulmonary optimization pending. Pulm. for management and follow up as indicated.

## 2024-10-22 NOTE — H&P PST ADULT - OTHER CARE PROVIDERS
Dr. Schafer [phone]; Pulm Dr. Dayday thornton Dr. Schafer [phone]; Pulm Dr. Dayday thornton [phone], Cardiology Dr. Fuentes [phone] Dr. Schafer [phone]; Pulm Dr. Dayday thornton [phone], Cardiology Dr. Fuentes [phone], Endocrine Dr. Mcpherson

## 2024-10-22 NOTE — H&P PST ADULT - ALLERGY TYPES
outdoor environmental allergies/indoor environmental allergies/reactions to medicines outdoor environmental allergies/indoor environmental allergies/reactions to medicines/reactions to food/reactions to insect bites

## 2024-10-22 NOTE — H&P PST ADULT - ALCOHOL USE HISTORY SINGLE SELECT
OPERATIVE REPORT  PATIENT NAME: April Randee    :  1940  MRN: [id number]  Pt Location:  GI ROOM 01    SURGERY DATE: 2021    Surgeon(s) and Role:     * Carlos Okeefe MD - Primary    Preop Diagnosis:  Malignant neoplasm of prostate ([facility] [address] ) Princess Coles    Post-Op Diagnosis Codes:     * Malignant neoplasm of prostate ([facility] [address] ) [C61]    Procedure(s) (LRB):  INSERTION OF FIDUCIAL MARKER (TRANSRECTAL ULTRASOUND GUIDANCE), SPACEOAR (N/A)    Specimen(s):  * No specimens in log *    Estimated Blood Loss:   Minimal    Drains:  * No LDAs found *    Anesthesia Type:   IV Sedation with Anesthesia    Operative Indications:  Malignant neoplasm of prostate ([facility] [address] ) [C61]      Operative Findings:  Successful placement of gold fiducials and SpaceOAR-CHARITO    Complications:   None    Procedure and Technique:  The patient was brought to the operating room properly identified  Intravenous sedation was administered  He was placed in lithotomy position prepped and draped in usual sterile fashion  Intravenous antibiotic was administered by Anesthesia  An appropriate time-out was performed  The patient was prepped and draped  The scrotum was taped up to the anterior abdominal wall to allow access to the perineum  Digital rectal examination was then performed  Transrectal ultrasound probe was then placed into the rectum and the prostate visualized  Under ultrasound guidance gold fiducials were then placed into the prostate in the right base, left base and right apex of the gland  SpaceOAR-CHARITO  Hydrogel was then prepared as described in the manufacture's instructions for use  With the patient maintained  in dorsal lithotomy position, the transrectal ultrasound probe was positioned  to enable visual guidance of the needle into the space between the prostate and the rectum    Under transrectal ultrasound guidance, the 15 cm 18 gauge needle was  inserted through the skin, subcutaneous tissue and rectal urethralis muscle and the needle tip advanced into the perirectal fat inferior to the prostate all by using a transperineal approach and with side fire transrectal ultrasound guidance  The needle position was confirmed in both sagittal and axial fields  Saline was used to dissect the space between Denonvilliers' fascia and the anterior rectal wall  In this way a space was created with hydro dissection  With the needle tip at mid gland, the axial field was used to  confirm the needle was not in the rectal wall  While maintaining  desired position aspiration was done to ensure that the needle was not in a vascular space  The assembled SpaceOAR  delivery system was then attached to the 18 gauge needle  Under ultrasound guidance in the sagittal plane, smooth continuous  injection technique was used to dispense the SpaceOAR  Hydrogel into the space between the prostate and rectum  The entire syringe contents (10 mL) was injected without stopping  Optimal visualization of the needle during Hydrogel administration was maintained at all times  No suspected penetration or compromise of the rectal wall occurred  The patient tolerated procedure well  The needle was removed  The patient was taken out of lithotomy position and awakened from anesthesia and taken to the recovery room in satisfactory condition     I was present for the entire procedure    Patient Disposition:  PACU     SIGNATURE: Mei Dobbins MD  DATE: September 21, 2021  TIME: 8:53 AM yes...

## 2024-10-22 NOTE — H&P PST ADULT - GENITOURINARY MALE
Estephanie @ [facility] called to inform an order for elevated toilet seat should be sent directly to medical supply co   [facility] will be contacting patient within the next 48 hrs to set up home visit  Patient notified of all information and referred him to [facility] or his insurance with questions regarding cost of elevated seat 
Patient of Dr Dylon Lo and s/p prostatectomy on 5/23/18 called office today requesting [address] [facility] referral and elevated toilet seat  He was discharged from hospital yesterday and was not provided with home health referral   Advised him the order will be placed and also provided phone number to [address] [place] [address] [facility] if patient would like to reach out to them 
not applicable

## 2024-10-22 NOTE — H&P PST ADULT - NSHP PST DIAGOTHER LIST_GEN_A_CORE
10/22/24 15:23 All available labs and diagnostics noted as documented. All abnormal labs and diagnostics noted as documented and have been faxed to PCP with whom medical optimization is pending.  T&S pending. Tao MS< FNP-BC

## 2024-10-22 NOTE — H&P PST ADULT - CARDIOVASCULAR
normal/regular rate and rhythm/S1 S2 present/no gallops/no rub/no JVD/normal PMI/no pedal edema/murmur details…

## 2024-10-22 NOTE — H&P PST ADULT - PROBLEM SELECTOR PLAN 11
Medical optimization pending. PCP for medical management and follow up as indicated..  ORT = 1., Surgical team to follow.

## 2024-10-22 NOTE — H&P PST ADULT - NSICDXPASTSURGICALHX_GEN_ALL_CORE_FT
PAST SURGICAL HISTORY:  H/O  section x 1 (2019)    H/O exploratory laparotomy     H/O knee surgery     H/O lumpectomy     History of bilateral ligation of fallopian tubes     History of cholecystectomy     History of colposcopy 4 times    History of removal of cyst removed 8 cysts from head     PAST SURGICAL HISTORY:  H/O  section x 1 (2019)    H/O dilation and curettage     H/O exploratory laparotomy     H/O knee surgery     H/O laparoscopy     H/O lumpectomy 2003    History of bilateral ligation of fallopian tubes     History of cholecystectomy     History of colposcopy 4 times    History of removal of cyst removed 8 cysts from head    Scalp lump

## 2024-10-22 NOTE — H&P PST ADULT - NSICDXPASTMEDICALHX_GEN_ALL_CORE_FT
PAST MEDICAL HISTORY:  Acid reflux     Asthma     Atrial fibrillation     Pneumonia     Type 2 diabetes mellitus      PAST MEDICAL HISTORY:  Acid reflux     Adenomyosis of uterus     Asthma     Atrial fibrillation     Dysmenorrhea     Excessive, frequent and irregular menstruation     Hyperlipidemia     Hypertension     Morbid obesity     Pneumonia     Type 2 diabetes mellitus

## 2024-10-22 NOTE — H&P PST ADULT - FEMALE-SPECIFIC SYMPTOMS
dysmenorrhea/menorrhagia/spotting/abnormal vaginal bleeding/pelvic pain dysmenorrhea/menorrhagia/abnormal vaginal bleeding/pelvic pain

## 2024-10-22 NOTE — H&P PST ADULT - HISTORY OF PRESENT ILLNESS
43 year old female with  PMH DM2, mild asthma, and GERD here for PST with history of abnormal uterine bleeding, dysmenorrhea, menometrorrhagia. Patient states she menstruated 71 days in a row over past summer. She reports undergoing a laparoscopic cholecystectomy in 2024 and recovered well. Patient was scheduled for hysterectomy 2024 but was unable to complete procedure due to respiratory function issues intubated when patient was inverted. Her menstrual period has been heavy in the past, but became twice is heavy after delivery of her son in . [id number], children age 2 and 5. She is through childbearing and ended in a definitive surgical procedure, Obstetrical history of 2 prior  sections, the last with bilateral salpingectomy for permanent sterilization on 2022. Today she feels well, Denies CP/SOB, no subjective fevers or chills.  She is scheduled for Robotic assisted Total Laparoscopic Hysterectomy possible bilateral salpingo-oophorectomy possible exploratory laparotomy by dr. Mckay on 2024.  Medical Clearance and pulm clearance pending     D&C procedure being done as she has heavy menstrual bleeding which she has had for her lifetime, but worse over the past 2 years since childbirth, states she is on norethindrone which is not helping her heavy bleeding, states she is still having clotting and having heavy bleeding. States she has had bleeding for 12 days. States she had 3 laparoscopies in the past to identify endometriosis which she states she does not have. States she has also had 2 d&c procedures for this. States she has pelvic pain with her periods, rated 6-7/10, states she also has menstrual cramps rated 9/10, relieved with nothing, states she has tried ibuprofen which she can no longer take due to recent new onset afib. States she has painful sex as well as she gets closer to her period. Denies vaginal discharge  45 y/o female presents today to [facility] pending  D&C video hysteroscopy novasure ablation and insertion of mirena IUD with Dr. Albert on 11/11/24 secondary to adenomyosis of the uterus, dysmenorrhea and excess and frequent menstruation with irregular cycle. Pt. states D&C procedure being done as she has heavy menstrual bleeding which she has had for her lifetime, but worse over the past 2 years since childbirth, states she is on norethindrone which is not helping her heavy bleeding, states she is still having clotting and having heavy bleeding. States she has had bleeding for 12 days. States she had 3 laparoscopies in the past to identify endometriosis which she states she does not have. States she has also had 2 d&c procedures for this. States she has pelvic pain with her periods, rated 6-7/10, states she also has menstrual cramps rated 9/10, relieved with nothing, states she has tried ibuprofen which she can no longer take due to recent new onset afib. States she has painful sex as well as she gets closer to her period. Denies vaginal discharge. History of asthma, states this was in childhood.  Patient was scheduled for hysterectomy 5/2024 but was unable to complete procedure due to respiratory function issues intubated when patient was inverted, states prior to this she had had a pneumonia, and "during procedure she had an elevation in a number that shouldn't have been that high." States she saw pulmonary Dr. Naylor and received clearance.  Pt. states she was scheduled for a Robotic assisted Total Laparoscopic Hysterectomy possible bilateral salpingo-oophorectomy possible exploratory laparotomy by dr. Mckay on 9/18/2024but that intra op she developed new onset afib and HTN despite medications being administered, she was referred to Cardiology Dr. Fuentes, and is undergoing cardiac work up. States she was told she requires anticoagulation but that cannot start it due to ongoing bleeding issue that requires intervention. History of morbid obesity, type 2 DM, HLD, HTN.

## 2024-10-22 NOTE — H&P PST ADULT - GASTROINTESTINAL
negative normal/soft/nontender/nondistended/normal active bowel sounds/no guarding/no rigidity/no organomegaly/no palpable barbara/no masses palpable

## 2024-10-22 NOTE — H&P PST ADULT - PROBLEM SELECTOR PLAN 3
Medical optimization pending. PCP for medical management and follow up as indicated.  Cardiac optimization pending. Cardio. for cardiac management and follow up as indicated.

## 2024-10-22 NOTE — H&P PST ADULT - NEGATIVE NEUROLOGICAL SYMPTOMS
Explanation of exam/test/NPO/Call bell no transient paralysis/no weakness/no paresthesias/no generalized seizures/no focal seizures/no syncope/no tremors/no vertigo/no loss of sensation/no difficulty walking/no loss of consciousness/no hemiparesis/no confusion/no facial palsy

## 2024-10-22 NOTE — H&P PST ADULT - ASSESSMENT
43 y/o female presents today to [facility] pending  D&C video hysteroscopy novasure ablation and insertion of mirena IUD with Dr. Albert on 24 secondary to adenomyosis of the uterus, dysmenorrhea and excess and frequent menstruation with irregular cycle. Pt. states D&C procedure being done as she has heavy menstrual bleeding which she has had for her lifetime, but worse over the past 2 years since childbirth, states she is on norethindrone which is not helping her heavy bleeding, states she is still having clotting and having heavy bleeding. States she has had bleeding for 12 days. States she had 3 laparoscopies in the past to identify endometriosis which she states she does not have. States she has also had 2 d&c procedures for this. States she has pelvic pain with her periods, rated 6-7/10, states she also has menstrual cramps rated 9/10, relieved with nothing, states she has tried ibuprofen which she can no longer take due to recent new onset afib. States she has painful sex as well as she gets closer to her period. Denies vaginal discharge. History of asthma, states this was in childhood, and only has symptoms when she gets sick. Denies recent illness other than a cold which has resolved 2024.  Patient was scheduled for hysterectomy 2024 but was unable to complete procedure due to respiratory function issues intubated when patient was inverted, states prior to this she had had a pneumonia, and "during procedure she had an elevation in a number that shouldn't have been that high." States she saw pulmonary Dr. Naylor and received clearance.  Pt. states she was scheduled for a Robotic assisted Total Laparoscopic Hysterectomy possible bilateral salpingo-oophorectomy possible exploratory laparotomy by dr. Mckay on 4but that intra op she developed new onset afib and HTN despite medications being administered, she was referred to Cardiology Dr. Fuentes, and is undergoing cardiac work up. States she was told she requires anticoagulation but that cannot start it due to ongoing bleeding issue that requires intervention. History of morbid obesity, type 2 DM, HLD, HTN.  BMI 41.2.    Pt. educated and instructed regarding all preoperative instructions and education as per policy via both verbal and written means of communication and pt. verbalized agreement and understanding.  Patient educated on surgical scrub, preadmission instructions, medical clearance and day of procedure medications, pt. verbalizes understanding and agreement.  Pt. to have medical clearance with  Dr. Schafer, pt. advised to schedule appt and pt. verbalized agreement and understanding.  Pt. to have cardiac clearance with Dr. Fuentes, pt. advised to inform cardiology that she is having procedure and pt. verbalized agreement and understanding.  Pt. to have pulmonary clearance with Dr. Naylor, spoke to pt. via phone regarding this, pt. states she was seen already for clearance, pt. advised to inform office of procedure scheduled for  and that PST will contact office for clearance, and pt. verbalized agreement and understanding.   Pt instructed to stop vitamins/supplements/herbal medications/NSAIDS for one week prior to surgery and pt. verbalized agreement and understanding.  Pt. advised to discuss preoperative toujeo instructions with pcp, pt. advised rybelsus will need to be held on the morning of the surgery and to discuss this with pcp, and pt. verbalized agreement and understanding.     OPIOID RISK TOOL    JENNIFER EACH BOX THAT APPLIES AND ADD TOTALS AT THE END    FAMILY HISTORY OF SUBSTANCE ABUSE                 FEMALE         MALE                                                Alcohol                             [  ]1 pt          [  ]3pts                                               Illegal Durgs                     [  ]2 pts        [  ]3pts                                               Rx Drugs                           [  ]4 pts        [  ]4 pts    PERSONAL HISTORY OF SUBSTANCE ABUSE                                                                                          Alcohol                             [  ]3 pts       [  ]3 pts                                               Illegal Drugs                     [  ]4 pts        [  ]4 pts                                               Rx Drugs                           [  ]5 pts        [  ]5 pts    AGE BETWEEN 16-45 YEARS                                      [ x ]1 pt         [  ]1 pt    HISTORY OF PREADOLESCENT   SEXUAL ABUSE                                                             [  ]3 pts        [  ]0pts    PSYCHOLOGICAL DISEASE                     ADD, OCD, Bipolar, Schizophrenia        [  ]2 pts         [  ]2 pts                      Depression                                               [  ]1 pt           [  ]1 pt           SCORING TOTAL   (add numbers and type here)              (1)                                     A score of 3 or lower indicated LOW risk for future opioid abuse  A score of 4 to 7 indicated moderate risk for future opioid abuse  A score of 8 or higher indicates a high risk for opioid abuse    CAPRINI SCORE    AGE RELATED RISK FACTORS                                                             [x ] Age 41-60 years                                            (1 Point)  [ ] Age: 61-74 years                                           (2 Points)                 [ ] Age= 75 years                                                (3 Points)             DISEASE RELATED RISK FACTORS                                                       [ ] Edema in the lower extremities                 (1 Point)                     [ ] Varicose veins                                               (1 Point)                                 [ x] BMI > 25 Kg/m2                                            (1 Point)                                  [ ] Serious infection (ie PNA)                            (1 Point)                     [ ] Lung disease ( COPD, Emphysema)            (1 Point)                                                                          [ ] Acute myocardial infarction                         (1 Point)                  [ ] Congestive heart failure (in the previous month)  (1 Point)         [ ] Inflammatory bowel disease                            (1 Point)                  [ ] Central venous access, PICC or Port               (2 points)       (within the last month)                                                                [ ] Stroke (in the previous month)                        (5 Points)    [ ] Previous or present malignancy                       (2 points)                                                                                                                                                         HEMATOLOGY RELATED FACTORS                                                         [ ] Prior episodes of VTE                                     (3 Points)                     [ ] Positive family history for VTE                      (3 Points)                  [ ] Prothrombin 10122 A                                     (3 Points)                     [ ] Factor V Leiden                                                (3 Points)                        [ ] Lupus anticoagulants                                      (3 Points)                                                           [ ] Anticardiolipin antibodies                              (3 Points)                                                       [ ] High homocysteine in the blood                   (3 Points)                                             [ ] Other congenital or acquired thrombophilia      (3 Points)                                                [ ] Heparin induced thrombocytopenia                  (3 Points)                                        MOBILITY RELATED FACTORS  [ ] Bed rest                                                         (1 Point)  [ ] Plaster cast                                                    (2 points)  [ ] Bed bound for more than 72 hours           (2 Points)    GENDER SPECIFIC FACTORS  [ ] Pregnancy or had a baby within the last month   (1 Point)  [ ] Post-partum < 6 weeks                                   (1 Point)  [ ] Hormonal therapy  or oral contraception   (1 Point)  [ ] History of pregnancy complications              (1 point)  [ ] Unexplained or recurrent              (1 Point)    OTHER RISK FACTORS                                           (1 Point)  [x ] BMI >40, smoking, diabetes requiring insulin, chemotherapy  blood transfusions and length of surgery over 2 hours    SURGERY RELATED RISK FACTORS  [ ]  Section within the last month     (1 Point)  [ ] Minor surgery                                                  (1 Point)  [ ] Arthroscopic surgery                                       (2 Points)  [x ] Planned major surgery lasting more            (2 Points)      than 45 minutes     [ ] Elective hip or knee joint replacement       (5 points)       surgery                                                TRAUMA RELATED RISK FACTORS  [ ] Fracture of the hip, pelvis, or leg                       (5 Points)  [ ] Spinal cord injury resulting in paralysis             (5 points)       (in the previous month)    [ ] Paralysis  (less than 1 month)                             (5 Points)  [ ] Multiple Trauma within 1 month                        (5 Points)    Total Score [     5   ]    Caprini Score 0-2: Low Risk, NO VTE prophylaxis required for most patients, encourage ambulation  Caprini Score 3-6: Moderate Risk , pharmacologic VTE prophylaxis is indicated for most patients (in the absence of contraindications)  Caprini Score Greater than or =7: High risk, pharmocologic VTE prophylaxis indicated for most patients (in the absence of contraindications)

## 2024-10-22 NOTE — H&P PST ADULT - NEUROLOGICAL
negative details… normal/cranial nerves II-XII intact/sensation intact/responds to verbal commands/no spontaneous movement/superficial reflexes intact

## 2024-10-22 NOTE — H&P PST ADULT - PROBLEM SELECTOR PLAN 8
Medical, cardiac and pulmonary optimizations pending for   D&C video hysteroscopy novasure ablation and insertion of mirena IUD with Dr. Albert on 11/11/24 secondary to adenomyosis of the uterus, dysmenorrhea and excess and frequent menstruation with irregular cycle.

## 2024-10-23 ENCOUNTER — NON-APPOINTMENT (OUTPATIENT)
Age: 44
End: 2024-10-23

## 2024-10-23 DIAGNOSIS — E66.01 MORBID (SEVERE) OBESITY DUE TO EXCESS CALORIES: ICD-10-CM

## 2024-10-23 DIAGNOSIS — E78.5 HYPERLIPIDEMIA, UNSPECIFIED: ICD-10-CM

## 2024-10-23 DIAGNOSIS — I48.91 UNSPECIFIED ATRIAL FIBRILLATION: ICD-10-CM

## 2024-10-28 PROBLEM — I48.91 UNSPECIFIED ATRIAL FIBRILLATION: Chronic | Status: ACTIVE | Noted: 2024-10-22

## 2024-10-28 PROBLEM — I10 ESSENTIAL (PRIMARY) HYPERTENSION: Chronic | Status: ACTIVE | Noted: 2024-10-22

## 2024-10-28 PROBLEM — J18.9 PNEUMONIA, UNSPECIFIED ORGANISM: Chronic | Status: ACTIVE | Noted: 2024-10-22

## 2024-10-28 PROBLEM — N94.6 DYSMENORRHEA, UNSPECIFIED: Chronic | Status: ACTIVE | Noted: 2024-10-22

## 2024-10-28 PROBLEM — E78.5 HYPERLIPIDEMIA, UNSPECIFIED: Chronic | Status: ACTIVE | Noted: 2024-10-22

## 2024-10-28 PROBLEM — E66.01 MORBID (SEVERE) OBESITY DUE TO EXCESS CALORIES: Chronic | Status: ACTIVE | Noted: 2024-10-22

## 2024-10-28 PROBLEM — N92.1 EXCESSIVE AND FREQUENT MENSTRUATION WITH IRREGULAR CYCLE: Chronic | Status: ACTIVE | Noted: 2024-10-22

## 2024-10-28 PROBLEM — N80.03 ADENOMYOSIS OF THE UTERUS: Chronic | Status: ACTIVE | Noted: 2024-10-22

## 2024-10-29 ENCOUNTER — OUTPATIENT (OUTPATIENT)
Dept: OUTPATIENT SERVICES | Facility: HOSPITAL | Age: 44
LOS: 1 days | End: 2024-10-29
Payer: COMMERCIAL

## 2024-10-29 ENCOUNTER — NON-APPOINTMENT (OUTPATIENT)
Age: 44
End: 2024-10-29

## 2024-10-29 VITALS — DIASTOLIC BLOOD PRESSURE: 80 MMHG | SYSTOLIC BLOOD PRESSURE: 120 MMHG

## 2024-10-29 DIAGNOSIS — R22.0 LOCALIZED SWELLING, MASS AND LUMP, HEAD: Chronic | ICD-10-CM

## 2024-10-29 DIAGNOSIS — Z98.890 OTHER SPECIFIED POSTPROCEDURAL STATES: Chronic | ICD-10-CM

## 2024-10-29 DIAGNOSIS — Z98.891 HISTORY OF UTERINE SCAR FROM PREVIOUS SURGERY: Chronic | ICD-10-CM

## 2024-10-29 DIAGNOSIS — Z90.49 ACQUIRED ABSENCE OF OTHER SPECIFIED PARTS OF DIGESTIVE TRACT: Chronic | ICD-10-CM

## 2024-10-29 DIAGNOSIS — Z98.51 TUBAL LIGATION STATUS: Chronic | ICD-10-CM

## 2024-10-29 DIAGNOSIS — Z01.812 ENCOUNTER FOR PREPROCEDURAL LABORATORY EXAMINATION: ICD-10-CM

## 2024-10-29 LAB
ALLERGY+IMMUNOLOGY DIAG STUDY NOTE: SIGNIFICANT CHANGE UP
BLD GP AB SCN SERPL QL: SIGNIFICANT CHANGE UP
DIR ANTIGLOB POLYSPECIFIC INTERPRETATION: SIGNIFICANT CHANGE UP

## 2024-10-29 PROCEDURE — 86077 PHYS BLOOD BANK SERV XMATCH: CPT

## 2024-10-30 NOTE — OB NEONATOLOGY/PEDIATRICIAN DELIVERY SUMMARY - NSDELIVERYTYPEA_OBGYN_ALL_OB
H&P reviewed. The patient was examined and there are no changes to the H&P.      Review of system as in H&P otherwise negative.      
 Delivery

## 2024-11-01 ENCOUNTER — TRANSCRIPTION ENCOUNTER (OUTPATIENT)
Age: 44
End: 2024-11-01

## 2024-11-08 ENCOUNTER — NON-APPOINTMENT (OUTPATIENT)
Age: 44
End: 2024-11-08

## 2024-11-10 PROCEDURE — 86901 BLOOD TYPING SEROLOGIC RH(D): CPT

## 2024-11-10 PROCEDURE — 86900 BLOOD TYPING SEROLOGIC ABO: CPT

## 2024-11-10 PROCEDURE — 86870 RBC ANTIBODY IDENTIFICATION: CPT

## 2024-11-10 PROCEDURE — 86922 COMPATIBILITY TEST ANTIGLOB: CPT

## 2024-11-10 PROCEDURE — 86850 RBC ANTIBODY SCREEN: CPT

## 2024-11-10 PROCEDURE — 86880 COOMBS TEST DIRECT: CPT

## 2024-11-10 PROCEDURE — 36415 COLL VENOUS BLD VENIPUNCTURE: CPT

## 2024-11-11 ENCOUNTER — OUTPATIENT (OUTPATIENT)
Dept: INPATIENT UNIT | Facility: HOSPITAL | Age: 44
LOS: 1 days | End: 2024-11-11
Payer: COMMERCIAL

## 2024-11-11 ENCOUNTER — TRANSCRIPTION ENCOUNTER (OUTPATIENT)
Age: 44
End: 2024-11-11

## 2024-11-11 ENCOUNTER — APPOINTMENT (OUTPATIENT)
Dept: OBGYN | Facility: HOSPITAL | Age: 44
End: 2024-11-11
Payer: COMMERCIAL

## 2024-11-11 ENCOUNTER — RESULT REVIEW (OUTPATIENT)
Age: 44
End: 2024-11-11

## 2024-11-11 VITALS
RESPIRATION RATE: 16 BRPM | OXYGEN SATURATION: 100 % | SYSTOLIC BLOOD PRESSURE: 151 MMHG | HEART RATE: 96 BPM | TEMPERATURE: 98 F | DIASTOLIC BLOOD PRESSURE: 93 MMHG

## 2024-11-11 VITALS
HEART RATE: 80 BPM | SYSTOLIC BLOOD PRESSURE: 126 MMHG | TEMPERATURE: 98 F | DIASTOLIC BLOOD PRESSURE: 71 MMHG | OXYGEN SATURATION: 98 % | RESPIRATION RATE: 20 BRPM

## 2024-11-11 DIAGNOSIS — N80.03 ADENOMYOSIS OF THE UTERUS: ICD-10-CM

## 2024-11-11 DIAGNOSIS — Z98.890 OTHER SPECIFIED POSTPROCEDURAL STATES: Chronic | ICD-10-CM

## 2024-11-11 DIAGNOSIS — Z98.891 HISTORY OF UTERINE SCAR FROM PREVIOUS SURGERY: Chronic | ICD-10-CM

## 2024-11-11 DIAGNOSIS — R22.0 LOCALIZED SWELLING, MASS AND LUMP, HEAD: Chronic | ICD-10-CM

## 2024-11-11 DIAGNOSIS — N94.6 DYSMENORRHEA, UNSPECIFIED: ICD-10-CM

## 2024-11-11 DIAGNOSIS — N92.1 EXCESSIVE AND FREQUENT MENSTRUATION WITH IRREGULAR CYCLE: ICD-10-CM

## 2024-11-11 DIAGNOSIS — Z90.49 ACQUIRED ABSENCE OF OTHER SPECIFIED PARTS OF DIGESTIVE TRACT: Chronic | ICD-10-CM

## 2024-11-11 DIAGNOSIS — Z98.51 TUBAL LIGATION STATUS: Chronic | ICD-10-CM

## 2024-11-11 LAB
GLUCOSE BLDC GLUCOMTR-MCNC: 115 MG/DL — HIGH (ref 70–99)
GLUCOSE BLDC GLUCOMTR-MCNC: 93 MG/DL — SIGNIFICANT CHANGE UP (ref 70–99)

## 2024-11-11 PROCEDURE — 82962 GLUCOSE BLOOD TEST: CPT

## 2024-11-11 PROCEDURE — 58563 HYSTEROSCOPY ABLATION: CPT

## 2024-11-11 PROCEDURE — 58300 INSERT INTRAUTERINE DEVICE: CPT

## 2024-11-11 PROCEDURE — C1889: CPT

## 2024-11-11 PROCEDURE — 88305 TISSUE EXAM BY PATHOLOGIST: CPT | Mod: 26

## 2024-11-11 PROCEDURE — 88305 TISSUE EXAM BY PATHOLOGIST: CPT

## 2024-11-11 DEVICE — NOVASURE KIT DISP: Type: IMPLANTABLE DEVICE | Status: FUNCTIONAL

## 2024-11-11 RX ORDER — ACETAMINOPHEN 500 MG
975 TABLET ORAL ONCE
Refills: 0 | Status: COMPLETED | OUTPATIENT
Start: 2024-11-11 | End: 2024-11-11

## 2024-11-11 RX ORDER — FENTANYL CITRAT/DEXTROSE 5%/PF 1250MCG/50
25 PATIENT CONTROLLED ANALGESIA SYRINGE INTRAVENOUS
Refills: 0 | Status: DISCONTINUED | OUTPATIENT
Start: 2024-11-11 | End: 2024-11-11

## 2024-11-11 RX ORDER — SODIUM CHLORIDE 9 MG/ML
3 INJECTION, SOLUTION INTRAMUSCULAR; INTRAVENOUS; SUBCUTANEOUS ONCE
Refills: 0 | Status: COMPLETED | OUTPATIENT
Start: 2024-11-11 | End: 2024-11-11

## 2024-11-11 RX ORDER — ONDANSETRON HYDROCHLORIDE 2 MG/ML
4 INJECTION, SOLUTION INTRAMUSCULAR; INTRAVENOUS ONCE
Refills: 0 | Status: COMPLETED | OUTPATIENT
Start: 2024-11-11 | End: 2024-11-11

## 2024-11-11 RX ORDER — OXYCODONE HYDROCHLORIDE 30 MG/1
5 TABLET ORAL ONCE
Refills: 0 | Status: DISCONTINUED | OUTPATIENT
Start: 2024-11-11 | End: 2024-11-11

## 2024-11-11 RX ORDER — CELECOXIB 100 MG
400 CAPSULE ORAL ONCE
Refills: 0 | Status: COMPLETED | OUTPATIENT
Start: 2024-11-11 | End: 2024-11-11

## 2024-11-11 RX ORDER — APREPITANT 40 MG/1
40 CAPSULE ORAL ONCE
Refills: 0 | Status: COMPLETED | OUTPATIENT
Start: 2024-11-11 | End: 2024-11-11

## 2024-11-11 RX ADMIN — ONDANSETRON HYDROCHLORIDE 4 MILLIGRAM(S): 2 INJECTION, SOLUTION INTRAMUSCULAR; INTRAVENOUS at 14:38

## 2024-11-11 RX ADMIN — APREPITANT 40 MILLIGRAM(S): 40 CAPSULE ORAL at 11:22

## 2024-11-11 RX ADMIN — SODIUM CHLORIDE 3 MILLILITER(S): 9 INJECTION, SOLUTION INTRAMUSCULAR; INTRAVENOUS; SUBCUTANEOUS at 11:21

## 2024-11-11 RX ADMIN — Medication 975 MILLIGRAM(S): at 11:08

## 2024-11-11 RX ADMIN — Medication 400 MILLIGRAM(S): at 11:09

## 2024-11-11 NOTE — ASU DISCHARGE PLAN (ADULT/PEDIATRIC) - CARE PROVIDER_API CALL
Aby Albert  Obstetrics and Gynecology  [address], [address]  [place] [address]  Phone: [phone]  Fax: [phone]  Follow Up Time:

## 2024-11-11 NOTE — BRIEF OPERATIVE NOTE - NSICDXBRIEFPROCEDURE_GEN_ALL_CORE_FT
PROCEDURES:  Hysteroscopy, with dilation and curettage of uterus 11-Nov-2024 13:32:46  Aby Albert endometrial ablation 11-Nov-2024 13:32:57  Aby Albert  Insertion of Mirena IUD 11-Nov-2024 13:33:05  Aby Albert

## 2024-11-11 NOTE — BRIEF OPERATIVE NOTE - OPERATION/FINDINGS
Anteverted uterus sounding to 12 cm, cavity length 6 cm, cavity width 4.5 cm, power 161, ablation time 41 seconds

## 2024-11-11 NOTE — ASU DISCHARGE PLAN (ADULT/PEDIATRIC) - FINANCIAL ASSISTANCE
[facility] provides services at a reduced cost to those who are determined to be eligible through [facility]’s financial assistance program. Information regarding [facility]’s financial assistance program can be found by going to [email or web address] or by calling [phone].

## 2024-11-15 LAB — SURGICAL PATHOLOGY STUDY: SIGNIFICANT CHANGE UP

## 2024-12-03 ENCOUNTER — APPOINTMENT (OUTPATIENT)
Dept: OBGYN | Facility: CLINIC | Age: 44
End: 2024-12-03
Payer: COMMERCIAL

## 2024-12-03 VITALS
SYSTOLIC BLOOD PRESSURE: 154 MMHG | DIASTOLIC BLOOD PRESSURE: 87 MMHG | BODY MASS INDEX: 40.5 KG/M2 | WEIGHT: 252 LBS | HEIGHT: 66 IN

## 2024-12-03 DIAGNOSIS — Z09 ENCOUNTER FOR FOLLOW-UP EXAMINATION AFTER COMPLETED TREATMENT FOR CONDITIONS OTHER THAN MALIGNANT NEOPLASM: ICD-10-CM

## 2024-12-03 PROCEDURE — 99212 OFFICE O/P EST SF 10 MIN: CPT

## 2024-12-04 ENCOUNTER — NON-APPOINTMENT (OUTPATIENT)
Age: 44
End: 2024-12-04

## 2024-12-31 ENCOUNTER — NON-APPOINTMENT (OUTPATIENT)
Age: 44
End: 2024-12-31

## 2024-12-31 ENCOUNTER — APPOINTMENT (OUTPATIENT)
Dept: CARDIOLOGY | Facility: CLINIC | Age: 44
End: 2024-12-31
Payer: COMMERCIAL

## 2024-12-31 VITALS
DIASTOLIC BLOOD PRESSURE: 82 MMHG | BODY MASS INDEX: 40.18 KG/M2 | OXYGEN SATURATION: 97 % | SYSTOLIC BLOOD PRESSURE: 130 MMHG | HEIGHT: 66 IN | WEIGHT: 250 LBS | HEART RATE: 86 BPM

## 2024-12-31 DIAGNOSIS — I48.91 UNSPECIFIED ATRIAL FIBRILLATION: ICD-10-CM

## 2024-12-31 DIAGNOSIS — E66.01 MORBID (SEVERE) OBESITY DUE TO EXCESS CALORIES: ICD-10-CM

## 2024-12-31 DIAGNOSIS — E78.5 HYPERLIPIDEMIA, UNSPECIFIED: ICD-10-CM

## 2024-12-31 DIAGNOSIS — I10 ESSENTIAL (PRIMARY) HYPERTENSION: ICD-10-CM

## 2024-12-31 PROCEDURE — 93000 ELECTROCARDIOGRAM COMPLETE: CPT

## 2024-12-31 PROCEDURE — 99215 OFFICE O/P EST HI 40 MIN: CPT | Mod: 25

## 2024-12-31 RX ORDER — APIXABAN 5 MG/1
5 TABLET, FILM COATED ORAL
Qty: 180 | Refills: 1 | Status: ACTIVE | COMMUNITY
Start: 2024-12-31 | End: 1900-01-01

## 2025-01-07 ENCOUNTER — APPOINTMENT (OUTPATIENT)
Dept: OBGYN | Facility: CLINIC | Age: 45
End: 2025-01-07
Payer: COMMERCIAL

## 2025-01-07 ENCOUNTER — APPOINTMENT (OUTPATIENT)
Dept: ANTEPARTUM | Facility: CLINIC | Age: 45
End: 2025-01-07
Payer: COMMERCIAL

## 2025-01-07 ENCOUNTER — ASOB RESULT (OUTPATIENT)
Age: 45
End: 2025-01-07

## 2025-01-07 VITALS
SYSTOLIC BLOOD PRESSURE: 143 MMHG | BODY MASS INDEX: 40.5 KG/M2 | HEIGHT: 66 IN | WEIGHT: 252 LBS | DIASTOLIC BLOOD PRESSURE: 95 MMHG

## 2025-01-07 DIAGNOSIS — N92.1 EXCESSIVE AND FREQUENT MENSTRUATION WITH IRREGULAR CYCLE: ICD-10-CM

## 2025-01-07 DIAGNOSIS — Z30.431 ENCOUNTER FOR ROUTINE CHECKING OF INTRAUTERINE CONTRACEPTIVE DEVICE: ICD-10-CM

## 2025-01-07 DIAGNOSIS — N94.6 DYSMENORRHEA, UNSPECIFIED: ICD-10-CM

## 2025-01-07 DIAGNOSIS — N80.03 ADENOMYOSIS OF THE UTERUS: ICD-10-CM

## 2025-01-07 PROCEDURE — 76830 TRANSVAGINAL US NON-OB: CPT

## 2025-01-07 PROCEDURE — 76856 US EXAM PELVIC COMPLETE: CPT | Mod: 59

## 2025-01-07 PROCEDURE — 99213 OFFICE O/P EST LOW 20 MIN: CPT

## 2025-01-10 ENCOUNTER — NON-APPOINTMENT (OUTPATIENT)
Age: 45
End: 2025-01-10

## 2025-01-10 PROBLEM — Z30.431 IUD CHECK UP: Status: ACTIVE | Noted: 2019-10-22

## 2025-01-13 LAB
BASOPHILS # BLD AUTO: 0.04 K/UL
BASOPHILS NFR BLD AUTO: 0.5 %
EOSINOPHIL # BLD AUTO: 0.09 K/UL
EOSINOPHIL NFR BLD AUTO: 1.2 %
HCT VFR BLD CALC: 41.4 %
HGB BLD-MCNC: 13.6 G/DL
IMM GRANULOCYTES NFR BLD AUTO: 0.3 %
LYMPHOCYTES # BLD AUTO: 2.03 K/UL
LYMPHOCYTES NFR BLD AUTO: 26.3 %
MAN DIFF?: NORMAL
MCHC RBC-ENTMCNC: 29.4 PG
MCHC RBC-ENTMCNC: 32.9 G/DL
MCV RBC AUTO: 89.4 FL
MONOCYTES # BLD AUTO: 0.53 K/UL
MONOCYTES NFR BLD AUTO: 6.9 %
NEUTROPHILS # BLD AUTO: 5 K/UL
NEUTROPHILS NFR BLD AUTO: 64.8 %
PLATELET # BLD AUTO: 297 K/UL
RBC # BLD: 4.63 M/UL
RBC # FLD: 13.7 %
WBC # FLD AUTO: 7.71 K/UL

## 2025-01-17 ENCOUNTER — NON-APPOINTMENT (OUTPATIENT)
Age: 45
End: 2025-01-17

## 2025-01-23 ENCOUNTER — APPOINTMENT (OUTPATIENT)
Dept: CARDIOLOGY | Facility: CLINIC | Age: 45
End: 2025-01-23
Payer: COMMERCIAL

## 2025-01-23 ENCOUNTER — NON-APPOINTMENT (OUTPATIENT)
Age: 45
End: 2025-01-23

## 2025-01-23 VITALS
OXYGEN SATURATION: 100 % | HEIGHT: 66 IN | SYSTOLIC BLOOD PRESSURE: 139 MMHG | WEIGHT: 248 LBS | BODY MASS INDEX: 39.86 KG/M2 | HEART RATE: 78 BPM | DIASTOLIC BLOOD PRESSURE: 83 MMHG

## 2025-01-23 DIAGNOSIS — E78.5 HYPERLIPIDEMIA, UNSPECIFIED: ICD-10-CM

## 2025-01-23 DIAGNOSIS — R06.09 OTHER FORMS OF DYSPNEA: ICD-10-CM

## 2025-01-23 DIAGNOSIS — I10 ESSENTIAL (PRIMARY) HYPERTENSION: ICD-10-CM

## 2025-01-23 DIAGNOSIS — I48.91 UNSPECIFIED ATRIAL FIBRILLATION: ICD-10-CM

## 2025-01-23 DIAGNOSIS — N93.9 ABNORMAL UTERINE AND VAGINAL BLEEDING, UNSPECIFIED: ICD-10-CM

## 2025-01-23 DIAGNOSIS — R01.1 CARDIAC MURMUR, UNSPECIFIED: ICD-10-CM

## 2025-01-23 PROCEDURE — 93000 ELECTROCARDIOGRAM COMPLETE: CPT

## 2025-01-23 PROCEDURE — 99215 OFFICE O/P EST HI 40 MIN: CPT

## 2025-01-23 PROCEDURE — G2211 COMPLEX E/M VISIT ADD ON: CPT | Mod: NC

## 2025-01-27 LAB
BASOPHILS # BLD AUTO: 0.03 K/UL
BASOPHILS NFR BLD AUTO: 0.6 %
EOSINOPHIL # BLD AUTO: 0.11 K/UL
EOSINOPHIL NFR BLD AUTO: 2 %
FERRITIN SERPL-MCNC: 34 NG/ML
FOLATE SERPL-MCNC: 15.9 NG/ML
HCT VFR BLD CALC: 40.4 %
HGB BLD-MCNC: 13.1 G/DL
IMM GRANULOCYTES NFR BLD AUTO: 0.4 %
LYMPHOCYTES # BLD AUTO: 1.33 K/UL
LYMPHOCYTES NFR BLD AUTO: 24.6 %
MAN DIFF?: NORMAL
MCHC RBC-ENTMCNC: 29.5 PG
MCHC RBC-ENTMCNC: 32.4 G/DL
MCV RBC AUTO: 91 FL
MONOCYTES # BLD AUTO: 0.39 K/UL
MONOCYTES NFR BLD AUTO: 7.2 %
NEUTROPHILS # BLD AUTO: 3.52 K/UL
NEUTROPHILS NFR BLD AUTO: 65.2 %
PLATELET # BLD AUTO: 292 K/UL
RBC # BLD: 4.44 M/UL
RBC # FLD: 13.8 %
VIT B12 SERPL-MCNC: 476 PG/ML
WBC # FLD AUTO: 5.4 K/UL

## 2025-03-19 ENCOUNTER — RX RENEWAL (OUTPATIENT)
Age: 45
End: 2025-03-19

## 2025-03-20 ENCOUNTER — TRANSCRIPTION ENCOUNTER (OUTPATIENT)
Age: 45
End: 2025-03-20

## 2025-04-29 ENCOUNTER — NON-APPOINTMENT (OUTPATIENT)
Age: 45
End: 2025-04-29

## 2025-04-29 ENCOUNTER — APPOINTMENT (OUTPATIENT)
Dept: CARDIOLOGY | Facility: CLINIC | Age: 45
End: 2025-04-29

## 2025-04-29 VITALS
BODY MASS INDEX: 40.18 KG/M2 | OXYGEN SATURATION: 98 % | SYSTOLIC BLOOD PRESSURE: 131 MMHG | HEART RATE: 80 BPM | HEIGHT: 66 IN | DIASTOLIC BLOOD PRESSURE: 82 MMHG | WEIGHT: 250 LBS

## 2025-04-29 DIAGNOSIS — R01.1 CARDIAC MURMUR, UNSPECIFIED: ICD-10-CM

## 2025-04-29 DIAGNOSIS — R06.09 OTHER FORMS OF DYSPNEA: ICD-10-CM

## 2025-04-29 DIAGNOSIS — R51.9 HEADACHE, UNSPECIFIED: ICD-10-CM

## 2025-04-29 DIAGNOSIS — Z00.00 ENCOUNTER FOR GENERAL ADULT MEDICAL EXAMINATION W/OUT ABNORMAL FINDINGS: ICD-10-CM

## 2025-04-29 DIAGNOSIS — I10 ESSENTIAL (PRIMARY) HYPERTENSION: ICD-10-CM

## 2025-04-29 DIAGNOSIS — E66.01 MORBID (SEVERE) OBESITY DUE TO EXCESS CALORIES: ICD-10-CM

## 2025-04-29 DIAGNOSIS — E78.5 HYPERLIPIDEMIA, UNSPECIFIED: ICD-10-CM

## 2025-04-29 DIAGNOSIS — I48.91 UNSPECIFIED ATRIAL FIBRILLATION: ICD-10-CM

## 2025-04-29 DIAGNOSIS — R91.1 SOLITARY PULMONARY NODULE: ICD-10-CM

## 2025-04-29 PROCEDURE — 99215 OFFICE O/P EST HI 40 MIN: CPT | Mod: 25

## 2025-04-29 PROCEDURE — 93000 ELECTROCARDIOGRAM COMPLETE: CPT

## 2025-05-19 ENCOUNTER — APPOINTMENT (OUTPATIENT)
Dept: CARDIOLOGY | Facility: CLINIC | Age: 45
End: 2025-05-19
Payer: COMMERCIAL

## 2025-05-19 PROCEDURE — 93325 DOPPLER ECHO COLOR FLOW MAPG: CPT

## 2025-05-19 PROCEDURE — 93321 DOPPLER ECHO F-UP/LMTD STD: CPT

## 2025-05-19 PROCEDURE — 93308 TTE F-UP OR LMTD: CPT

## 2025-06-17 ENCOUNTER — RX RENEWAL (OUTPATIENT)
Age: 45
End: 2025-06-17

## 2025-07-16 ENCOUNTER — APPOINTMENT (OUTPATIENT)
Dept: CT IMAGING | Facility: CLINIC | Age: 45
End: 2025-07-16

## 2025-07-16 ENCOUNTER — OUTPATIENT (OUTPATIENT)
Dept: OUTPATIENT SERVICES | Facility: HOSPITAL | Age: 45
LOS: 1 days | End: 2025-07-16
Payer: COMMERCIAL

## 2025-07-16 DIAGNOSIS — Z98.51 TUBAL LIGATION STATUS: Chronic | ICD-10-CM

## 2025-07-16 DIAGNOSIS — E66.01 MORBID (SEVERE) OBESITY DUE TO EXCESS CALORIES: ICD-10-CM

## 2025-07-16 DIAGNOSIS — Z98.890 OTHER SPECIFIED POSTPROCEDURAL STATES: Chronic | ICD-10-CM

## 2025-07-16 DIAGNOSIS — R22.0 LOCALIZED SWELLING, MASS AND LUMP, HEAD: Chronic | ICD-10-CM

## 2025-07-16 DIAGNOSIS — Z90.49 ACQUIRED ABSENCE OF OTHER SPECIFIED PARTS OF DIGESTIVE TRACT: Chronic | ICD-10-CM

## 2025-07-16 DIAGNOSIS — Z98.891 HISTORY OF UTERINE SCAR FROM PREVIOUS SURGERY: Chronic | ICD-10-CM

## 2025-07-16 PROCEDURE — 71250 CT THORAX DX C-: CPT

## 2025-07-16 PROCEDURE — 71250 CT THORAX DX C-: CPT | Mod: 26

## 2025-08-11 ENCOUNTER — NON-APPOINTMENT (OUTPATIENT)
Age: 45
End: 2025-08-11

## 2025-08-18 DIAGNOSIS — R91.8 OTHER NONSPECIFIC ABNORMAL FINDING OF LUNG FIELD: ICD-10-CM

## 2025-08-28 ENCOUNTER — APPOINTMENT (OUTPATIENT)
Dept: CARDIOLOGY | Facility: CLINIC | Age: 45
End: 2025-08-28
Payer: COMMERCIAL

## 2025-08-28 VITALS — SYSTOLIC BLOOD PRESSURE: 116 MMHG | DIASTOLIC BLOOD PRESSURE: 78 MMHG | HEART RATE: 80 BPM | OXYGEN SATURATION: 99 %

## 2025-08-28 VITALS — OXYGEN SATURATION: 99 % | DIASTOLIC BLOOD PRESSURE: 80 MMHG | HEART RATE: 85 BPM | SYSTOLIC BLOOD PRESSURE: 110 MMHG

## 2025-08-28 VITALS
OXYGEN SATURATION: 99 % | WEIGHT: 243 LBS | SYSTOLIC BLOOD PRESSURE: 106 MMHG | HEART RATE: 77 BPM | BODY MASS INDEX: 39.05 KG/M2 | DIASTOLIC BLOOD PRESSURE: 72 MMHG | HEIGHT: 66 IN

## 2025-08-28 VITALS — OXYGEN SATURATION: 99 % | DIASTOLIC BLOOD PRESSURE: 82 MMHG | SYSTOLIC BLOOD PRESSURE: 100 MMHG | HEART RATE: 95 BPM

## 2025-08-28 DIAGNOSIS — E78.5 HYPERLIPIDEMIA, UNSPECIFIED: ICD-10-CM

## 2025-08-28 DIAGNOSIS — Z00.00 ENCOUNTER FOR GENERAL ADULT MEDICAL EXAMINATION W/OUT ABNORMAL FINDINGS: ICD-10-CM

## 2025-08-28 DIAGNOSIS — R06.09 OTHER FORMS OF DYSPNEA: ICD-10-CM

## 2025-08-28 DIAGNOSIS — R01.1 CARDIAC MURMUR, UNSPECIFIED: ICD-10-CM

## 2025-08-28 DIAGNOSIS — I10 ESSENTIAL (PRIMARY) HYPERTENSION: ICD-10-CM

## 2025-08-28 DIAGNOSIS — R51.9 HEADACHE, UNSPECIFIED: ICD-10-CM

## 2025-08-28 PROCEDURE — 93000 ELECTROCARDIOGRAM COMPLETE: CPT

## 2025-08-28 PROCEDURE — 99417 PROLNG OP E/M EACH 15 MIN: CPT

## 2025-08-28 PROCEDURE — 99215 OFFICE O/P EST HI 40 MIN: CPT | Mod: 25

## 2025-08-28 RX ORDER — LOSARTAN POTASSIUM 50 MG/1
50 TABLET, FILM COATED ORAL DAILY
Refills: 0 | Status: ACTIVE | COMMUNITY

## 2025-09-09 DIAGNOSIS — R42 DIZZINESS AND GIDDINESS: ICD-10-CM

## 2025-09-11 LAB
ALBUMIN, RANDOM URINE: <1.2 MG/DL
CREAT SPEC-SCNC: 100 MG/DL
MICROALBUMIN/CREAT 24H UR-RTO: NORMAL MG/G

## (undated) DEVICE — DRAPE TOWEL BLUE STICKY

## (undated) DEVICE — XI ENDOWRIST SUCTION IRRIGATOR 8MM

## (undated) DEVICE — XI CORD BIPOLAR CAUTERY (BLUE)

## (undated) DEVICE — URETERAL CATH RED RUBBER 14FR (GREEN)

## (undated) DEVICE — SUT VICRYL 4-0 18" PS-2 UNDYED

## (undated) DEVICE — PACK GENERAL LAPAROSCOPY

## (undated) DEVICE — SOL IRR POUR NS 0.9% 1000ML

## (undated) DEVICE — XI DRAPE ARM

## (undated) DEVICE — DRAPE TOWEL BLUE 17" X 24"

## (undated) DEVICE — TROCAR SURGIQUEST AIRSEAL 5MM X 100MM

## (undated) DEVICE — ENDOCATCH 10MM SPECIMEN POUCH

## (undated) DEVICE — DRSG KERLIX ROLL 4.5"

## (undated) DEVICE — LAP PAD W RING 18 X 18"

## (undated) DEVICE — WARMING BLANKET UPPER ADULT

## (undated) DEVICE — TROCAR COVIDIEN VERSAPORT BLADELESS OPTICAL 5MM STANDARD

## (undated) DEVICE — GLV 7.5 PROTEXIS (WHITE)

## (undated) DEVICE — ELCTR GROUNDING PAD ADULT COVIDIEN

## (undated) DEVICE — PREP DYNA-HEX CHG 4% 4OZ BOTTLE (BACTOSHIELD)

## (undated) DEVICE — SOL IRR BAG NS 0.9% 3000ML

## (undated) DEVICE — PREP TRAY DRY SKIN PREP SCRUB

## (undated) DEVICE — LIGASURE ATLAS 10MM 20CM

## (undated) DEVICE — DRSG TELFA 3 X 4

## (undated) DEVICE — XI CORD MONOPOLAR CAUTERY (GREEN)

## (undated) DEVICE — Device

## (undated) DEVICE — SOL INJ NS 0.9% 100ML

## (undated) DEVICE — POSITIONER PINK PAD PIGAZZI SYSTEM

## (undated) DEVICE — ACCESSORY AVETA WASTE MANAGEMENT 3MM

## (undated) DEVICE — BLADE SURGICAL #15 CARBON

## (undated) DEVICE — UTERINE MANIPULATOR CONMED VCARE MED 34MM

## (undated) DEVICE — SUT VLOC 90 2-0 12" P-14 UNDYED

## (undated) DEVICE — SOL IRR POUR H2O 1000ML

## (undated) DEVICE — XI DRAPE COLUMN

## (undated) DEVICE — DRAPE ROBOTIC

## (undated) DEVICE — ELCTR CORD FOOTSWITCH 1PLR LAPSCP 10FT

## (undated) DEVICE — LIGASURE BLUNT TIP 37CM

## (undated) DEVICE — PREP CHLORAPREP HI-LITE ORANGE 26ML

## (undated) DEVICE — XI ARM NEEDLE DRIVER MEGA

## (undated) DEVICE — APPLICATOR FOR FLOSEAL

## (undated) DEVICE — VENODYNE/SCD SLEEVE CALF MEDIUM

## (undated) DEVICE — AVETA CORAL HYSTEROSCOPE 4.6MM DISP

## (undated) DEVICE — TUBING AIRSEAL TRI-LUMEN FILTERED

## (undated) DEVICE — DRAPE 1/2 SHEET 40X57"

## (undated) DEVICE — FOLEY TRAY 16FR 5CC LF LUBRISIL ADVANCE TEMP CLOSED

## (undated) DEVICE — DEVICE PUREVIEW ACTIVE

## (undated) DEVICE — UTERINE MANIPULATOR CONMED VCARE SM 32MM

## (undated) DEVICE — PACK LITHOTOMY

## (undated) DEVICE — TROCAR COVIDIEN VERSAONE BLUNT TIP HASSAN 12MM

## (undated) DEVICE — AVETA FLUID MANAGEMENT ACCESSORY

## (undated) DEVICE — DRAPE C ARM UNIVERSAL

## (undated) DEVICE — UTERINE MANIPULATOR CONMED VCARE LG 37MM

## (undated) DEVICE — POSITIONER FOAM EGG CRATE ULNAR 2PCS (PINK)

## (undated) DEVICE — SUT MONOCRYL 4-0 27" PS-2 UNDYED

## (undated) DEVICE — PACK ROBOTIC

## (undated) DEVICE — ENDOCATCH ROBOTIC 8MM (PURPLE)

## (undated) DEVICE — TUBING INSUFFLATION LAP FILTER 10FT

## (undated) DEVICE — XI VESSEL SEALER

## (undated) DEVICE — ELCTR ROCKER SWITCH PENCIL BLUE 10FT

## (undated) DEVICE — SUT VICRYL 0 27" CT-2 UNDYED

## (undated) DEVICE — XI SEAL UNIVERSIAL 5-12MM

## (undated) DEVICE — D HELP - CLEARVIEW CLEARIFY SYSTEM

## (undated) DEVICE — BLADE SURGICAL #11 CARBON

## (undated) DEVICE — TROCAR COVIDIEN VERSAONE FIXATION CANNULA 5MM

## (undated) DEVICE — INSUFFLATION NDL COVIDIEN SURGINEEDLE VERESS 120MM

## (undated) DEVICE — DRAPE LIGHT HANDLE COVER (GREEN)

## (undated) DEVICE — DRSG DERMABOND 0.7ML

## (undated) DEVICE — XI OBTURATOR OPTICAL BLADELESS 8MM

## (undated) DEVICE — DRAPE 3/4 SHEET 52X76"

## (undated) DEVICE — XI TIP COVER

## (undated) DEVICE — LIGASURE MARYLAND 37CM

## (undated) DEVICE — SUT MONOCRYL 4-0 18" PS-2

## (undated) DEVICE — TUBING STRYKEFLOW II SUCTION / IRRIGATOR